# Patient Record
Sex: FEMALE | URBAN - METROPOLITAN AREA
[De-identification: names, ages, dates, MRNs, and addresses within clinical notes are randomized per-mention and may not be internally consistent; named-entity substitution may affect disease eponyms.]

---

## 2017-04-21 RX ORDER — CITALOPRAM 10 MG/1
0 TABLET, FILM COATED ORAL
Qty: 30 | Refills: 0 | COMMUNITY
Start: 2017-04-21

## 2017-04-27 RX ORDER — RANITIDINE HYDROCHLORIDE 150 MG/1
0 TABLET, FILM COATED ORAL
Qty: 60 | Refills: 0 | COMMUNITY
Start: 2017-04-27

## 2017-04-27 RX ORDER — MONTELUKAST 4 MG/1
0 TABLET, CHEWABLE ORAL
Qty: 30 | Refills: 0 | COMMUNITY
Start: 2017-04-27

## 2017-04-27 RX ORDER — QUETIAPINE FUMARATE 200 MG/1
0 TABLET, FILM COATED ORAL
Qty: 30 | Refills: 0 | COMMUNITY
Start: 2017-04-27

## 2017-05-13 RX ORDER — MEMANTINE HYDROCHLORIDE 10 MG/1
0 TABLET ORAL
Qty: 180 | Refills: 0 | COMMUNITY
Start: 2017-05-13

## 2017-05-16 ENCOUNTER — INPATIENT (INPATIENT)
Facility: HOSPITAL | Age: 82
LOS: 2 days | Discharge: NURSING, REHAB OR LONG-TERM CARE | DRG: 592 | End: 2017-05-19
Attending: HOSPITALIST | Admitting: HOSPITALIST
Payer: COMMERCIAL

## 2017-05-16 VITALS
WEIGHT: 100.09 LBS | RESPIRATION RATE: 20 BRPM | SYSTOLIC BLOOD PRESSURE: 96 MMHG | HEART RATE: 90 BPM | HEIGHT: 55 IN | DIASTOLIC BLOOD PRESSURE: 55 MMHG | OXYGEN SATURATION: 98 % | TEMPERATURE: 98 F

## 2017-05-16 LAB
ALBUMIN SERPL ELPH-MCNC: 3.2 G/DL — LOW (ref 3.5–5)
ALP SERPL-CCNC: 61 U/L — SIGNIFICANT CHANGE UP (ref 40–120)
ALT FLD-CCNC: 16 U/L DA — SIGNIFICANT CHANGE UP (ref 10–60)
ANION GAP SERPL CALC-SCNC: 6 MMOL/L — SIGNIFICANT CHANGE UP (ref 5–17)
APPEARANCE UR: ABNORMAL
APTT BLD: 40 SEC — HIGH (ref 27.5–37.4)
AST SERPL-CCNC: 15 U/L — SIGNIFICANT CHANGE UP (ref 10–40)
BACTERIA # UR AUTO: ABNORMAL /HPF
BASOPHILS # BLD AUTO: 0.1 K/UL — SIGNIFICANT CHANGE UP (ref 0–0.2)
BASOPHILS NFR BLD AUTO: 1.3 % — SIGNIFICANT CHANGE UP (ref 0–2)
BILIRUB SERPL-MCNC: 0.4 MG/DL — SIGNIFICANT CHANGE UP (ref 0.2–1.2)
BILIRUB UR-MCNC: ABNORMAL
BUN SERPL-MCNC: 24 MG/DL — HIGH (ref 7–18)
CALCIUM SERPL-MCNC: 9.5 MG/DL — SIGNIFICANT CHANGE UP (ref 8.4–10.5)
CHLORIDE SERPL-SCNC: 111 MMOL/L — HIGH (ref 96–108)
CO2 SERPL-SCNC: 26 MMOL/L — SIGNIFICANT CHANGE UP (ref 22–31)
COLOR SPEC: YELLOW — SIGNIFICANT CHANGE UP
CREAT SERPL-MCNC: 1.87 MG/DL — HIGH (ref 0.5–1.3)
DIFF PNL FLD: ABNORMAL
EOSINOPHIL # BLD AUTO: 0.1 K/UL — SIGNIFICANT CHANGE UP (ref 0–0.5)
EOSINOPHIL NFR BLD AUTO: 0.6 % — SIGNIFICANT CHANGE UP (ref 0–6)
EPI CELLS # UR: ABNORMAL (ref 0–10)
GLUCOSE SERPL-MCNC: 142 MG/DL — HIGH (ref 70–99)
GLUCOSE UR QL: NEGATIVE — SIGNIFICANT CHANGE UP
HCT VFR BLD CALC: 35.2 % — SIGNIFICANT CHANGE UP (ref 34.5–45)
HGB BLD-MCNC: 11.2 G/DL — LOW (ref 11.5–15.5)
INR BLD: 1.17 RATIO — HIGH (ref 0.88–1.16)
KETONES UR-MCNC: NEGATIVE — SIGNIFICANT CHANGE UP
LEUKOCYTE ESTERASE UR-ACNC: ABNORMAL
LIDOCAIN IGE QN: 62 U/L — LOW (ref 73–393)
LYMPHOCYTES # BLD AUTO: 1.8 K/UL — SIGNIFICANT CHANGE UP (ref 1–3.3)
LYMPHOCYTES # BLD AUTO: 18.5 % — SIGNIFICANT CHANGE UP (ref 13–44)
MCHC RBC-ENTMCNC: 26.3 PG — LOW (ref 27–34)
MCHC RBC-ENTMCNC: 31.7 GM/DL — LOW (ref 32–36)
MCV RBC AUTO: 82.9 FL — SIGNIFICANT CHANGE UP (ref 80–100)
MONOCYTES # BLD AUTO: 0.5 K/UL — SIGNIFICANT CHANGE UP (ref 0–0.9)
MONOCYTES NFR BLD AUTO: 5.6 % — SIGNIFICANT CHANGE UP (ref 2–14)
NEUTROPHILS # BLD AUTO: 7.3 K/UL — SIGNIFICANT CHANGE UP (ref 1.8–7.4)
NEUTROPHILS NFR BLD AUTO: 74.1 % — SIGNIFICANT CHANGE UP (ref 43–77)
NITRITE UR-MCNC: NEGATIVE — SIGNIFICANT CHANGE UP
PH UR: 5 — SIGNIFICANT CHANGE UP (ref 5–8)
PLATELET # BLD AUTO: 264 K/UL — SIGNIFICANT CHANGE UP (ref 150–400)
POTASSIUM SERPL-MCNC: 4.4 MMOL/L — SIGNIFICANT CHANGE UP (ref 3.5–5.3)
POTASSIUM SERPL-SCNC: 4.4 MMOL/L — SIGNIFICANT CHANGE UP (ref 3.5–5.3)
PROT SERPL-MCNC: 7.8 G/DL — SIGNIFICANT CHANGE UP (ref 6–8.3)
PROT UR-MCNC: 30 MG/DL
PROTHROM AB SERPL-ACNC: 12.8 SEC — HIGH (ref 9.8–12.7)
RBC # BLD: 4.25 M/UL — SIGNIFICANT CHANGE UP (ref 3.8–5.2)
RBC # FLD: 13.1 % — SIGNIFICANT CHANGE UP (ref 10.3–14.5)
RBC CASTS # UR COMP ASSIST: ABNORMAL /HPF (ref 0–2)
SODIUM SERPL-SCNC: 143 MMOL/L — SIGNIFICANT CHANGE UP (ref 135–145)
SP GR SPEC: 1.03 — HIGH (ref 1.01–1.02)
UROBILINOGEN FLD QL: 1
WBC # BLD: 9.8 K/UL — SIGNIFICANT CHANGE UP (ref 3.8–10.5)
WBC # FLD AUTO: 9.8 K/UL — SIGNIFICANT CHANGE UP (ref 3.8–10.5)
WBC UR QL: ABNORMAL /HPF (ref 0–5)

## 2017-05-16 PROCEDURE — 71010: CPT | Mod: 26

## 2017-05-16 RX ORDER — SODIUM CHLORIDE 9 MG/ML
3 INJECTION INTRAMUSCULAR; INTRAVENOUS; SUBCUTANEOUS ONCE
Qty: 0 | Refills: 0 | Status: COMPLETED | OUTPATIENT
Start: 2017-05-16 | End: 2017-05-16

## 2017-05-16 RX ADMIN — SODIUM CHLORIDE 3 MILLILITER(S): 9 INJECTION INTRAMUSCULAR; INTRAVENOUS; SUBCUTANEOUS at 21:52

## 2017-05-16 NOTE — ED PROVIDER NOTE - PSH
History of knee replacement, total, bilateral    History of tonsillectomy    Lung nodule  Right Lung Surgery  - 1/2014  PAD (peripheral artery disease)  s/p left lower extremity stent

## 2017-05-16 NOTE — ED PROVIDER NOTE - PMH
Dementia    Diabetes mellitus, type 2    Dyslipidemia    GERD (gastroesophageal reflux disease)    HTN (hypertension)    Overactive bladder    PAD (peripheral artery disease)    Pancreatitis

## 2017-05-16 NOTE — ED PROVIDER NOTE - CARE PLAN
Principal Discharge DX:	Sacral decubitus ulcer, stage IV  Secondary Diagnosis:	Sacral decubitus ulcer

## 2017-05-16 NOTE — ED ADULT NURSE NOTE - NS ED NURSE LEVEL OF CONSCIOUSNESS MENTAL STATUS
Denies known Latex allergy or symptoms of Latex sensitivity.  Medications verified, no changes.    Patient c/o right hip pain for more than one month.     Health Maintenance Summary     Topic Due On Due Status Completed On Postpone Until Reason    MAMMOGRAM - BREAST CANCER SCREENING Jan 1, 2013 Postponed Jan 1, 2011 Jun 13, 2017 Test ordered & Appt scheduled to perform    Pap Smear - Cervical Cancer Screening  Dec 8, 2020 Not Due Dec 8, 2015      Immunization - TDAP Pregnancy  Hidden       IMMUNIZATION - DTaP/Tdap/Td Sep 29, 1991 Overdue       Immunization-Influenza  Completed Dec 13, 2016            Patient is up to date, no discussion needed .       Awake

## 2017-05-16 NOTE — ED PROVIDER NOTE - OBJECTIVE STATEMENT
83 y/o F w/ PMHx of HTN & HLD presents to the ED c/o worsening sacral decubiti along w/ daughter stating she is having hard time taking care of pt. Pt complaining of worsening pain to sacrum area. Pt's daughter states she has made multiple pleas w/ PMD to get more help to point where daughter cannot physically be able to care of mother & needs assistance. Pt's daughter notes pt's sacral decubitus is getting worse because pt cannot be rolled as many times. Pt denies fever, chills, or any other complaints. Pt is allergic to Penicillin & Sulfa drugs.

## 2017-05-16 NOTE — ED PROVIDER NOTE - MEDICAL DECISION MAKING DETAILS
pt is here due to worsening scaral decub along with daughter with the inability to take care of pt.  Will nelson admit for for  and possible placement and evaluation of sacral decub.

## 2017-05-17 DIAGNOSIS — N17.9 ACUTE KIDNEY FAILURE, UNSPECIFIED: ICD-10-CM

## 2017-05-17 DIAGNOSIS — L89.152 PRESSURE ULCER OF SACRAL REGION, STAGE 2: ICD-10-CM

## 2017-05-17 DIAGNOSIS — L89.154 PRESSURE ULCER OF SACRAL REGION, STAGE 4: ICD-10-CM

## 2017-05-17 DIAGNOSIS — F03.90 UNSPECIFIED DEMENTIA, UNSPECIFIED SEVERITY, WITHOUT BEHAVIORAL DISTURBANCE, PSYCHOTIC DISTURBANCE, MOOD DISTURBANCE, AND ANXIETY: ICD-10-CM

## 2017-05-17 DIAGNOSIS — L89.159 PRESSURE ULCER OF SACRAL REGION, UNSPECIFIED STAGE: ICD-10-CM

## 2017-05-17 DIAGNOSIS — Z29.9 ENCOUNTER FOR PROPHYLACTIC MEASURES, UNSPECIFIED: ICD-10-CM

## 2017-05-17 DIAGNOSIS — N39.0 URINARY TRACT INFECTION, SITE NOT SPECIFIED: ICD-10-CM

## 2017-05-17 DIAGNOSIS — I10 ESSENTIAL (PRIMARY) HYPERTENSION: ICD-10-CM

## 2017-05-17 DIAGNOSIS — E11.9 TYPE 2 DIABETES MELLITUS WITHOUT COMPLICATIONS: ICD-10-CM

## 2017-05-17 LAB
CRP SERPL-MCNC: 2.7 MG/DL — HIGH (ref 0–0.4)
ERYTHROCYTE [SEDIMENTATION RATE] IN BLOOD: 59 MM/HR — HIGH (ref 0–20)

## 2017-05-17 PROCEDURE — 99223 1ST HOSP IP/OBS HIGH 75: CPT | Mod: GC

## 2017-05-17 PROCEDURE — 99285 EMERGENCY DEPT VISIT HI MDM: CPT

## 2017-05-17 RX ORDER — MEMANTINE HYDROCHLORIDE 10 MG/1
10 TABLET ORAL EVERY 12 HOURS
Qty: 0 | Refills: 0 | Status: DISCONTINUED | OUTPATIENT
Start: 2017-05-17 | End: 2017-05-19

## 2017-05-17 RX ORDER — VANCOMYCIN HCL 1 G
1000 VIAL (EA) INTRAVENOUS ONCE
Qty: 0 | Refills: 0 | Status: COMPLETED | OUTPATIENT
Start: 2017-05-17 | End: 2017-05-17

## 2017-05-17 RX ORDER — INSULIN LISPRO 100/ML
VIAL (ML) SUBCUTANEOUS
Qty: 0 | Refills: 0 | Status: DISCONTINUED | OUTPATIENT
Start: 2017-05-17 | End: 2017-05-19

## 2017-05-17 RX ORDER — DONEPEZIL HYDROCHLORIDE 10 MG/1
5 TABLET, FILM COATED ORAL AT BEDTIME
Qty: 0 | Refills: 0 | Status: DISCONTINUED | OUTPATIENT
Start: 2017-05-17 | End: 2017-05-19

## 2017-05-17 RX ORDER — QUETIAPINE FUMARATE 200 MG/1
25 TABLET, FILM COATED ORAL AT BEDTIME
Qty: 0 | Refills: 0 | Status: DISCONTINUED | OUTPATIENT
Start: 2017-05-17 | End: 2017-05-19

## 2017-05-17 RX ORDER — ACETAMINOPHEN 500 MG
650 TABLET ORAL EVERY 6 HOURS
Qty: 0 | Refills: 0 | Status: DISCONTINUED | OUTPATIENT
Start: 2017-05-17 | End: 2017-05-19

## 2017-05-17 RX ORDER — MORPHINE SULFATE 50 MG/1
1 CAPSULE, EXTENDED RELEASE ORAL EVERY 4 HOURS
Qty: 0 | Refills: 0 | Status: DISCONTINUED | OUTPATIENT
Start: 2017-05-17 | End: 2017-05-19

## 2017-05-17 RX ORDER — ATORVASTATIN CALCIUM 80 MG/1
10 TABLET, FILM COATED ORAL AT BEDTIME
Qty: 0 | Refills: 0 | Status: DISCONTINUED | OUTPATIENT
Start: 2017-05-17 | End: 2017-05-19

## 2017-05-17 RX ORDER — VALSARTAN 80 MG/1
160 TABLET ORAL DAILY
Qty: 0 | Refills: 0 | Status: DISCONTINUED | OUTPATIENT
Start: 2017-05-17 | End: 2017-05-17

## 2017-05-17 RX ORDER — MEMANTINE HYDROCHLORIDE 10 MG/1
10 TABLET ORAL DAILY
Qty: 0 | Refills: 0 | Status: DISCONTINUED | OUTPATIENT
Start: 2017-05-17 | End: 2017-05-17

## 2017-05-17 RX ORDER — CEFTRIAXONE 500 MG/1
1 INJECTION, POWDER, FOR SOLUTION INTRAMUSCULAR; INTRAVENOUS EVERY 24 HOURS
Qty: 0 | Refills: 0 | Status: DISCONTINUED | OUTPATIENT
Start: 2017-05-17 | End: 2017-05-19

## 2017-05-17 RX ORDER — PIPERACILLIN AND TAZOBACTAM 4; .5 G/20ML; G/20ML
3.38 INJECTION, POWDER, LYOPHILIZED, FOR SOLUTION INTRAVENOUS ONCE
Qty: 0 | Refills: 0 | Status: DISCONTINUED | OUTPATIENT
Start: 2017-05-17 | End: 2017-05-17

## 2017-05-17 RX ORDER — SODIUM CHLORIDE 9 MG/ML
1000 INJECTION INTRAMUSCULAR; INTRAVENOUS; SUBCUTANEOUS
Qty: 0 | Refills: 0 | Status: DISCONTINUED | OUTPATIENT
Start: 2017-05-17 | End: 2017-05-18

## 2017-05-17 RX ORDER — MORPHINE SULFATE 50 MG/1
1 CAPSULE, EXTENDED RELEASE ORAL ONCE
Qty: 0 | Refills: 0 | Status: DISCONTINUED | OUTPATIENT
Start: 2017-05-17 | End: 2017-05-17

## 2017-05-17 RX ORDER — HEPARIN SODIUM 5000 [USP'U]/ML
5000 INJECTION INTRAVENOUS; SUBCUTANEOUS EVERY 12 HOURS
Qty: 0 | Refills: 0 | Status: DISCONTINUED | OUTPATIENT
Start: 2017-05-17 | End: 2017-05-19

## 2017-05-17 RX ORDER — FAMOTIDINE 10 MG/ML
20 INJECTION INTRAVENOUS DAILY
Qty: 0 | Refills: 0 | Status: DISCONTINUED | OUTPATIENT
Start: 2017-05-17 | End: 2017-05-19

## 2017-05-17 RX ORDER — MONTELUKAST 4 MG/1
10 TABLET, CHEWABLE ORAL AT BEDTIME
Qty: 0 | Refills: 0 | Status: DISCONTINUED | OUTPATIENT
Start: 2017-05-17 | End: 2017-05-19

## 2017-05-17 RX ORDER — CITALOPRAM 10 MG/1
40 TABLET, FILM COATED ORAL DAILY
Qty: 0 | Refills: 0 | Status: DISCONTINUED | OUTPATIENT
Start: 2017-05-17 | End: 2017-05-19

## 2017-05-17 RX ADMIN — HEPARIN SODIUM 5000 UNIT(S): 5000 INJECTION INTRAVENOUS; SUBCUTANEOUS at 17:12

## 2017-05-17 RX ADMIN — CITALOPRAM 40 MILLIGRAM(S): 10 TABLET, FILM COATED ORAL at 11:32

## 2017-05-17 RX ADMIN — QUETIAPINE FUMARATE 25 MILLIGRAM(S): 200 TABLET, FILM COATED ORAL at 21:52

## 2017-05-17 RX ADMIN — MORPHINE SULFATE 1 MILLIGRAM(S): 50 CAPSULE, EXTENDED RELEASE ORAL at 11:30

## 2017-05-17 RX ADMIN — SODIUM CHLORIDE 65 MILLILITER(S): 9 INJECTION INTRAMUSCULAR; INTRAVENOUS; SUBCUTANEOUS at 06:07

## 2017-05-17 RX ADMIN — SODIUM CHLORIDE 65 MILLILITER(S): 9 INJECTION INTRAMUSCULAR; INTRAVENOUS; SUBCUTANEOUS at 17:12

## 2017-05-17 RX ADMIN — ATORVASTATIN CALCIUM 10 MILLIGRAM(S): 80 TABLET, FILM COATED ORAL at 21:52

## 2017-05-17 RX ADMIN — MORPHINE SULFATE 1 MILLIGRAM(S): 50 CAPSULE, EXTENDED RELEASE ORAL at 12:30

## 2017-05-17 RX ADMIN — MEMANTINE HYDROCHLORIDE 10 MILLIGRAM(S): 10 TABLET ORAL at 17:12

## 2017-05-17 RX ADMIN — FAMOTIDINE 20 MILLIGRAM(S): 10 INJECTION INTRAVENOUS at 11:32

## 2017-05-17 RX ADMIN — HEPARIN SODIUM 5000 UNIT(S): 5000 INJECTION INTRAVENOUS; SUBCUTANEOUS at 05:49

## 2017-05-17 RX ADMIN — CEFTRIAXONE 100 GRAM(S): 500 INJECTION, POWDER, FOR SOLUTION INTRAMUSCULAR; INTRAVENOUS at 11:32

## 2017-05-17 RX ADMIN — DONEPEZIL HYDROCHLORIDE 5 MILLIGRAM(S): 10 TABLET, FILM COATED ORAL at 21:52

## 2017-05-17 RX ADMIN — Medication 250 MILLIGRAM(S): at 03:19

## 2017-05-17 RX ADMIN — MONTELUKAST 10 MILLIGRAM(S): 4 TABLET, CHEWABLE ORAL at 21:52

## 2017-05-17 NOTE — H&P ADULT - PROBLEM SELECTOR PLAN 4
Will hold home losartan due to renal function   F/up with PMD for baseline renal function   Systolic Blood pressure  <120

## 2017-05-17 NOTE — PHYSICAL THERAPY INITIAL EVALUATION ADULT - PLANNED THERAPY INTERVENTIONS, PT EVAL
strengthening/bed mobility training/neuromuscular re-education/gait training/transfer training/balance training/ROM/postural re-education

## 2017-05-17 NOTE — PHYSICAL THERAPY INITIAL EVALUATION ADULT - GENERAL OBSERVATIONS, REHAB EVAL
Consult received, chart reviewed. Patient received supine in bed, NAD, + L IV. Spouse and daughter present; RN present changing sacral dressings. Patient agreed to EVALUATION from Physical Therapist.

## 2017-05-17 NOTE — CONSULT NOTE ADULT - SUBJECTIVE AND OBJECTIVE BOX
83 yo F PMH dementia admitted for decubitus ulcer. Surgery called for evaluation. History obtained from chart. Pt is bedbound w/ long standing h/o ulcer and daughter unable to take care of her.    PAST MEDICAL & SURGICAL HISTORY:  PAD (peripheral artery disease)  Overactive bladder  GERD (gastroesophageal reflux disease)  Dyslipidemia  HTN (hypertension)  Pancreatitis  Dementia  Diabetes mellitus, type 2  Lung nodule: Right Lung Surgery  - 1/2014  History of tonsillectomy  PAD (peripheral artery disease): s/p left lower extremity stent  History of knee replacement, total, bilateral    MEDICATIONS  (STANDING):  citalopram 40milliGRAM(s) Oral daily  atorvastatin 10milliGRAM(s) Oral at bedtime  QUEtiapine 25milliGRAM(s) Oral at bedtime  donepezil 5milliGRAM(s) Oral at bedtime  famotidine    Tablet 20milliGRAM(s) Oral daily  montelukast 10milliGRAM(s) Oral at bedtime  heparin  Injectable 5000Unit(s) SubCutaneous every 12 hours  memantine 10milliGRAM(s) Oral every 12 hours  insulin lispro (HumaLOG) corrective regimen sliding scale  SubCutaneous three times a day before meals  cefTRIAXone   IVPB 1Gram(s) IV Intermittent every 24 hours  sodium chloride 0.9%. 1000milliLiter(s) IV Continuous <Continuous>    MEDICATIONS  (PRN):  acetaminophen    Suspension. 650milliGRAM(s) Oral every 6 hours PRN Moderate Pain (4 - 6)  morphine  - Injectable 1milliGRAM(s) IV Push every 4 hours PRN Severe Pain (7 - 10)    Vital Signs Last 24 Hrs  T(C): 37.3, Max: 37.3 (05-17 @ 02:10)  T(F): 99.2, Max: 99.2 (05-17 @ 15:40)  HR: 81 (76 - 90)  BP: 139/64 (96/55 - 139/64)  BP(mean): --  RR: 16 (16 - 20)  SpO2: 100% (98% - 100%)    PE:   Gen: awake, not alert or oriented, NAD  Abd: soft NT ND  Back: stage 2 decubitus ulcers on b/l buttock. R buttock with 1x1.5cm of necrotic tissue.  L buttock with stage 2 ulcer. no purulence or foul smell noted.                           11.2   9.8   )-----------( 264      ( 16 May 2017 21:53 )             35.2     05-16    143  |  111<H>  |  24<H>  ----------------------------<  142<H>  4.4   |  26  |  1.87<H>    Ca    9.5      16 May 2017 21:53    TPro  7.8  /  Alb  3.2<L>  /  TBili  0.4  /  DBili  x   /  AST  15  /  ALT  16  /  AlkPhos  61  05-16

## 2017-05-17 NOTE — H&P ADULT - PROBLEM SELECTOR PLAN 5
will continue with memantine will continue with memantine; advanced dementia and unable to complete ADLs

## 2017-05-17 NOTE — CHART NOTE - NSCHARTNOTEFT_GEN_A_CORE
Patient was seen and examined at bedside.     Sacral wound: largest Patient was seen and examined at bedside.     # Sacral wound: various stage, some portion of the wound is covered with eschar, but most of the lesions are stage 2. Largest on right upper buttock 5cm x 5.2cm with no active discharge or bleeding, unstageable(escharous postion noted). Left buttock 2cm x 1.7cm stage 3, right lower buttock  stage 2, 4cm x 1cm. Coccyx stage 2 2.5cm x 4cm. Bilateral heels stage 1.    Wound care nurse and Dr. Nails was consulted for wound care.    # UTI: Continue ceftriaxone 1g IV daily, Day 1. F/U urine culture.    # Social issue: Social work follow up for long term placement. Out of bed to chair, PT consultation. Patient was seen and examined at bedside.     # Sacral wound: various stage, some portion of the wound is covered with eschar, but most of the lesions are stage 2. Largest on right upper buttock 5cm x 5.2cm with no active discharge or bleeding, unstageable(escharous postion noted). Left buttock 2cm x 1.7cm stage 3, right lower buttock  stage 2, 4cm x 1cm. Coccyx stage 2 2.5cm x 4cm. Bilateral heels stage 1.    ESR 57, CRP 2.7, no fever or leukocytosis.    Wound care nurse and Dr. Nails was consulted for wound care. Surgery was consulted and evaluated patient at the bedside, no debridement or further intervention needed.    No need for MRI of the sacrum as per attending Dr. Menendez, as wound does not look actively infected.    # UTI: Continue ceftriaxone 1g IV daily, Day 1. F/U urine culture.    # Social issue: Social work follow up for long term placement. Out of bed to chair, PT consultation. Patient was seen and examined at bedside.     # Sacral wound: various stage, some portion of the wound is covered with eschar, but most of the lesions are stage 2. Largest on right upper buttock 5cm x 5.2cm with no active discharge or bleeding, unstageable(escharous postion noted). Left buttock 2cm x 1.7cm stage 3, right lower buttock  stage 2, 4cm x 1cm. Coccyx stage 2 2.5cm x 4cm. Bilateral heels stage 1.    ESR 57, CRP 2.7, no fever or leukocytosis.    Wound care nurse and Dr. Nails was consulted for wound care. Surgery was consulted and evaluated patient at the bedside, no debridement or further intervention needed.    No need for MRI of the sacrum as per attending Dr. Menendez, as wound does not look actively infected.    # UTI: Continue ceftriaxone 1g IV daily, Day 1. F/U urine culture.    # Social issue: Social work follow up for long term placement. Out of bed to chair, PT consultation was done: Sub- Acute Rehab. Patient was seen and examined at bedside.     # Sacral wound: various stage, some portion of the wound is covered with eschar, but most of the lesions are stage 2. Largest on right upper buttock 5cm x 5.2cm with no active discharge or bleeding, unstageable(escharous postion noted). Left buttock 2cm x 1.7cm stage 3, right lower buttock  stage 2, 4cm x 1cm. Coccyx stage 2 2.5cm x 4cm. Bilateral heels stage 1.    ESR 59, CRP 2.7, no fever or leukocytosis.    Wound care nurse and Dr. Nails was consulted for wound care. Surgery was consulted and evaluated patient at the bedside, no debridement or further intervention needed.    No need for MRI of the sacrum as per attending Dr. Menendez, as wound does not look actively infected.    # UTI: Continue ceftriaxone 1g IV daily, Day 1. F/U urine culture.    # Social issue: Social work follow up for long term placement. Out of bed to chair, PT consultation was done: Sub- Acute Rehab.

## 2017-05-17 NOTE — PHYSICAL THERAPY INITIAL EVALUATION ADULT - LEVEL OF INDEPENDENCE: STAIR NEGOTIATION, REHAB EVAL
Unable to assess at this time secondary to pt impaired strength and does not negotiate stairs at baseline.

## 2017-05-17 NOTE — PHYSICAL THERAPY INITIAL EVALUATION ADULT - ADDITIONAL COMMENTS
Pt daughter reports pt ambulated short household distances with assist and rolling walker. Daughter reports pt has been requiring additional assist recently. Pt does not own any additional equipment besides rolling walker.

## 2017-05-17 NOTE — PHYSICAL THERAPY INITIAL EVALUATION ADULT - PHYSICAL ASSIST/NONPHYSICAL ASSIST: STAND/SIT, REHAB EVAL
nonverbal cues (demo/gestures)/verbal cues/2 person assist verbal cues/1 person assist/nonverbal cues (demo/gestures)

## 2017-05-17 NOTE — CONSULT NOTE ADULT - ASSESSMENT
Bilateral heel decubitus DTI  -Protective dressing to bilateral heel. Please provide and apply Z-flex or multipodus singer to both heels  -CHANDLER/PVR  -Elevate bilateral LE and off load bilateral heel  -Will continue to monitor

## 2017-05-17 NOTE — PHYSICAL THERAPY INITIAL EVALUATION ADULT - TRANSFER SAFETY CONCERNS NOTED: BED/CHAIR, REHAB EVAL
decreased balance during turns/decreased weight-shifting ability/stand pivot/decreased safety awareness/decreased sequencing ability

## 2017-05-17 NOTE — PHYSICAL THERAPY INITIAL EVALUATION ADULT - CRITERIA FOR SKILLED THERAPEUTIC INTERVENTIONS
therapy frequency/anticipated discharge recommendation/rehab potential/impairments found/functional limitations in following categories/risk reduction/prevention/predicted duration of therapy intervention

## 2017-05-17 NOTE — H&P ADULT - ASSESSMENT
82 F from home ,lives with daughter , with PMH of dementia AOx0-1 , HTN, bedbound was brought to ER by daughter as she is unable to take care of her and worsening decubitus ulcer.   Stage II decubitus ulcer on buttock, no signs of infection , clean dry wound. ESR elevated but UA mildly positive .

## 2017-05-17 NOTE — H&P ADULT - PROBLEM SELECTOR PLAN 3
Unknown baseline   Pt clinically appears dry   Will start gentle hydration   F/up with PMD for baseline Cr.

## 2017-05-17 NOTE — H&P ADULT - PROBLEM SELECTOR PLAN 2
Stage II decubitus ulcer on buttock, no signs of infection , clean dry wound. ESR elevated but UA mildly positive .  Wound care consult   Received vancomycin and levaquin in ED Stage II decubitus ulcer on buttock, no signs of infection , clean dry wound. ESR elevated but UA mildly positive .  Wound care consult - Dr Schwarz  Received vancomycin and levaquin in ED

## 2017-05-17 NOTE — PHYSICAL THERAPY INITIAL EVALUATION ADULT - FOLLOWS COMMANDS/ANSWERS QUESTIONS, REHAB EVAL
<25% of the time/unable to follow multi-step instructions 25% of the time/able to follow single-step instructions

## 2017-05-17 NOTE — H&P ADULT - ATTENDING COMMENTS
Patient was seen and examined by myself with team at about 11:20 a. Case was discussed with house staff in details.  Elderly female with advanced dementia admitted for progression of dementia with presumed UTI and dehydration  she was noted with sacral decubitous ulcers- continue with local wound care started  she also has renal failure- avoid nephrotoxins.  Other plan as detailed above.

## 2017-05-17 NOTE — PHYSICAL THERAPY INITIAL EVALUATION ADULT - GROSSLY INTACT, SENSORY
Unable to formally assess at this time secondary to impaired cognition, however, pt appeared to react to light touch

## 2017-05-17 NOTE — H&P ADULT - PROBLEM SELECTOR PLAN 6
unclear if patient is on metformin, as per outpt medication , no metformin   Please call pharmacy for confirmation   Will c/w HSS for now   F/up HbA1c

## 2017-05-17 NOTE — PHYSICAL THERAPY INITIAL EVALUATION ADULT - MANUAL MUSCLE TESTING RESULTS, REHAB EVAL
b/l UE shoulder F/ABD 3-/5, elbow F/E 3+/5. Unable to formally assess LE MMT secondary to impaired cognition and inability to follow commands, but, LE at least 2+/5 through ability to perform functional mobility today

## 2017-05-17 NOTE — H&P ADULT - PROBLEM SELECTOR PLAN 1
UA mildly positive   As pt unable to express pain or burning   Will continue with rocephin   F/up urine culture

## 2017-05-17 NOTE — CONSULT NOTE ADULT - PROBLEM SELECTOR RECOMMENDATION 9
1. Recommend Santyl to area of necrosis on R buttock  2. medihoney to remaining areas of ulcer  3. frequent turning  4. no surgical intervention necessary.

## 2017-05-17 NOTE — PHYSICAL THERAPY INITIAL EVALUATION ADULT - TRANSFER SAFETY CONCERNS NOTED: SIT/STAND, REHAB EVAL
decreased weight-shifting ability/losing balance/decreased step length/decreased safety awareness/decreased balance during turns

## 2017-05-17 NOTE — H&P ADULT - HISTORY OF PRESENT ILLNESS
82 F from home ,lives with daughter , with PMH of dementia AOx0-1 , HTN, bedbound was brought to ER by daughter as she is unable to take care of her. Patient unable to provide any history, AOX0, denied any pain anywhere in her body.   As per ED note:  81 y/o F w/ PMHx of HTN & HLD presents to the ED c/o worsening sacral decubiti along w/ daughter stating she is having hard time taking care of pt. Pt complaining of worsening pain to sacrum area. Pt's daughter states she has made multiple pleas w/ PMD to get more help to point where daughter cannot physically be able to care of mother & needs assistance. Pt's daughter notes pt's sacral decubitus is getting worse because pt cannot be rolled as many times.

## 2017-05-17 NOTE — CONSULT NOTE ADULT - SUBJECTIVE AND OBJECTIVE BOX
82 F from home with PMH of dementia, HTN, bedbound was brought to ER by daughter as she is unable to take care of her. Patient unable to provide any history, AOX0, Patient's son was at bedside. Patient was admitted for UTI and sacral decubitus ulcers. Consulted for bilateral heel pressure ulcers.      Past Medical History:  Dementia    Diabetes mellitus, type 2    Dyslipidemia    GERD (gastroesophageal reflux disease)    HTN (hypertension)    Overactive bladder    PAD (peripheral artery disease)    Pancreatitis.    Past Surgical History:  History of knee replacement, total, bilateral    History of tonsillectomy    Lung nodule  Right Lung Surgery  - 1/2014  PAD (peripheral artery disease)  s/p left lower extremity stent  Medications at home:   MEMANTINE HCL 10 MG TABLET:    ·PRAVASTATIN SODIUM 40 MG TAB:   · MONTELUKAST SOD 10 MG TABLET:   · QUETIAPINE FUMARATE 25 MG TAB:   · CITALOPRAM HBR 40 MG TABLET:   · docusate sodium 100 mg oral capsule: 1 cap(s) orally 3 times a day  · senna oral tablet: 2 tab(s) orally once a day (at bedtime)  · VESIcare 5 mg oral tablet: 1 tab(s) orally once a day  4 pm  · metformin 1000 mg oral tablet: 1 tab(s) orally 2 times a day  Breakfast and Bedtime  · Diovan 160 mg oral tablet: 1 tab(s) orally once a day  AM  · donepezil 5 mg oral tablet: 1 tab(s) orally once a day (at bedtime)  · ranitidine 150 mg oral tablet: 1 tab(s) orally once a day  · acetaminophen 325 mg oral tablet: 1 tab(s) orally once, As Needed  Pain  Allergy: PCN, Sulfa drugs  SOCIAL HX: unknown if ever smoked cig.  LOWER EXTREMITY PHYSICAL EXAM:  81 y/o female awake and alert but not oriented in NAD  Vitals: T-99.2, BP-139/64, P-81, R-16, %  DERM: Skin is cool and dry. Deep tissue injuries to bilateral heel, right measuring 5.1 cm x 4.9 cm with dry hard necrotic tissue/eschar covering with dark, hyperpigmentation. No drainage, no purulent, no malodor, no erythema. Left heel decubitus ulcer DTI, measuring 5.3cm x 5.0 cm with fluctuant eschar covering with dark, hyperpigmentation. No drainage, no purulent, no malodor, no erythema. Nails are elongated, thickened and dystrophic x 10.   VASC: Non-palpable pedal pulses bilateral, no edema  NEURO: unable to perform, pt on co-operating  M/S: Dorsally contracted digits 2-5 bilateral, Lateral deviated hallux bilateral

## 2017-05-17 NOTE — PHYSICAL THERAPY INITIAL EVALUATION ADULT - SKIN COLOR/CHARACTERISTICS
at exposed areas except multiple sacral ulcers; dressings c/d/i multiple sacral ulcers; dressings c/d/i, b/l posterior heel DTIs

## 2017-05-18 DIAGNOSIS — T14.8 OTHER INJURY OF UNSPECIFIED BODY REGION: ICD-10-CM

## 2017-05-18 LAB
ANION GAP SERPL CALC-SCNC: 10 MMOL/L — SIGNIFICANT CHANGE UP (ref 5–17)
BUN SERPL-MCNC: 16 MG/DL — SIGNIFICANT CHANGE UP (ref 7–18)
CALCIUM SERPL-MCNC: 9.8 MG/DL — SIGNIFICANT CHANGE UP (ref 8.4–10.5)
CHLORIDE SERPL-SCNC: 115 MMOL/L — HIGH (ref 96–108)
CHOLEST SERPL-MCNC: 149 MG/DL — SIGNIFICANT CHANGE UP (ref 10–199)
CO2 SERPL-SCNC: 22 MMOL/L — SIGNIFICANT CHANGE UP (ref 22–31)
CREAT SERPL-MCNC: 1.24 MG/DL — SIGNIFICANT CHANGE UP (ref 0.5–1.3)
CULTURE RESULTS: NO GROWTH — SIGNIFICANT CHANGE UP
GLUCOSE SERPL-MCNC: 108 MG/DL — HIGH (ref 70–99)
HBA1C BLD-MCNC: 5.8 % — HIGH (ref 4–5.6)
HCT VFR BLD CALC: 33 % — LOW (ref 34.5–45)
HDLC SERPL-MCNC: 54 MG/DL — SIGNIFICANT CHANGE UP (ref 40–125)
HGB BLD-MCNC: 11.2 G/DL — LOW (ref 11.5–15.5)
LIPID PNL WITH DIRECT LDL SERPL: 79 MG/DL — SIGNIFICANT CHANGE UP
MCHC RBC-ENTMCNC: 28.3 PG — SIGNIFICANT CHANGE UP (ref 27–34)
MCHC RBC-ENTMCNC: 34 GM/DL — SIGNIFICANT CHANGE UP (ref 32–36)
MCV RBC AUTO: 83.3 FL — SIGNIFICANT CHANGE UP (ref 80–100)
PLATELET # BLD AUTO: 222 K/UL — SIGNIFICANT CHANGE UP (ref 150–400)
POTASSIUM SERPL-MCNC: 4.7 MMOL/L — SIGNIFICANT CHANGE UP (ref 3.5–5.3)
POTASSIUM SERPL-SCNC: 4.7 MMOL/L — SIGNIFICANT CHANGE UP (ref 3.5–5.3)
RBC # BLD: 3.97 M/UL — SIGNIFICANT CHANGE UP (ref 3.8–5.2)
RBC # FLD: 14.5 % — SIGNIFICANT CHANGE UP (ref 10.3–14.5)
SODIUM SERPL-SCNC: 147 MMOL/L — HIGH (ref 135–145)
SPECIMEN SOURCE: SIGNIFICANT CHANGE UP
TOTAL CHOLESTEROL/HDL RATIO MEASUREMENT: 2.8 RATIO — LOW (ref 3.3–7.1)
TRIGL SERPL-MCNC: 82 MG/DL — SIGNIFICANT CHANGE UP (ref 10–149)
TSH SERPL-MCNC: 2.02 UU/ML — SIGNIFICANT CHANGE UP (ref 0.34–4.82)
VIT B12 SERPL-MCNC: 408 PG/ML — SIGNIFICANT CHANGE UP (ref 243–894)
WBC # BLD: 6.2 K/UL — SIGNIFICANT CHANGE UP (ref 3.8–10.5)
WBC # FLD AUTO: 6.2 K/UL — SIGNIFICANT CHANGE UP (ref 3.8–10.5)

## 2017-05-18 PROCEDURE — 93925 LOWER EXTREMITY STUDY: CPT | Mod: 26

## 2017-05-18 PROCEDURE — 99232 SBSQ HOSP IP/OBS MODERATE 35: CPT | Mod: GC

## 2017-05-18 RX ORDER — COLLAGENASE CLOSTRIDIUM HIST. 250 UNIT/G
1 OINTMENT (GRAM) TOPICAL THREE TIMES A DAY
Qty: 0 | Refills: 0 | Status: DISCONTINUED | OUTPATIENT
Start: 2017-05-18 | End: 2017-05-19

## 2017-05-18 RX ADMIN — CEFTRIAXONE 100 GRAM(S): 500 INJECTION, POWDER, FOR SOLUTION INTRAMUSCULAR; INTRAVENOUS at 12:54

## 2017-05-18 RX ADMIN — ATORVASTATIN CALCIUM 10 MILLIGRAM(S): 80 TABLET, FILM COATED ORAL at 22:05

## 2017-05-18 RX ADMIN — HEPARIN SODIUM 5000 UNIT(S): 5000 INJECTION INTRAVENOUS; SUBCUTANEOUS at 05:12

## 2017-05-18 RX ADMIN — Medication 1 APPLICATION(S): at 12:54

## 2017-05-18 RX ADMIN — HEPARIN SODIUM 5000 UNIT(S): 5000 INJECTION INTRAVENOUS; SUBCUTANEOUS at 18:33

## 2017-05-18 RX ADMIN — DONEPEZIL HYDROCHLORIDE 5 MILLIGRAM(S): 10 TABLET, FILM COATED ORAL at 22:05

## 2017-05-18 RX ADMIN — Medication 1 APPLICATION(S): at 22:06

## 2017-05-18 RX ADMIN — FAMOTIDINE 20 MILLIGRAM(S): 10 INJECTION INTRAVENOUS at 12:54

## 2017-05-18 RX ADMIN — MEMANTINE HYDROCHLORIDE 10 MILLIGRAM(S): 10 TABLET ORAL at 18:34

## 2017-05-18 RX ADMIN — MEMANTINE HYDROCHLORIDE 10 MILLIGRAM(S): 10 TABLET ORAL at 05:12

## 2017-05-18 RX ADMIN — QUETIAPINE FUMARATE 25 MILLIGRAM(S): 200 TABLET, FILM COATED ORAL at 22:05

## 2017-05-18 RX ADMIN — CITALOPRAM 40 MILLIGRAM(S): 10 TABLET, FILM COATED ORAL at 12:54

## 2017-05-18 RX ADMIN — MONTELUKAST 10 MILLIGRAM(S): 4 TABLET, CHEWABLE ORAL at 22:05

## 2017-05-18 NOTE — DISCHARGE NOTE ADULT - MEDICATION SUMMARY - MEDICATIONS TO TAKE
I will START or STAY ON the medications listed below when I get home from the hospital:    acetaminophen 325 mg oral tablet  -- 1 tab(s) by mouth once, As Needed  Pain  -- Indication: For back pain    aspirin 81 mg oral tablet, chewable  -- 1 tab(s) by mouth once a day  -- Indication: For Peripheral arterial disease    CITALOPRAM HBR 40 MG TABLET  -- Indication: For Dementia    metformin 1000 mg oral tablet  -- 1 tab(s) by mouth 2 times a day  Breakfast and Bedtime  -- Indication: For Diabetes    PRAVASTATIN SODIUM 40 MG TAB  -- Indication: For HLD    QUETIAPINE FUMARATE 25 MG TAB  -- Indication: For Dementia    donepezil 5 mg oral tablet  -- 1 tab(s) by mouth once a day (at bedtime)  -- Indication: For Dementia    collagenase 250 units/g topical ointment  -- 1 application on skin 3 times a day  -- Indication: For Decubitus ulcer of sacral region, stage 2    ranitidine 150 mg oral tablet  -- 1 tab(s) by mouth once a day  -- Indication: For need for prophylaxis    docusate sodium 100 mg oral capsule  -- 1 cap(s) by mouth 3 times a day  -- Indication: For constipation    senna oral tablet  -- 2 tab(s) by mouth once a day (at bedtime)  -- Indication: For constipation    MONTELUKAST SOD 10 MG TABLET  -- Indication: For COPD    MEMANTINE HCL 10 MG TABLET  -- Indication: For Dementia    VESIcare 5 mg oral tablet  -- 1 tab(s) by mouth once a day  4 pm  -- Indication: For Urinary incontinence I will START or STAY ON the medications listed below when I get home from the hospital:    acetaminophen 325 mg oral tablet  -- 1 tab(s) by mouth once, As Needed  Pain  -- Indication: For back pain    aspirin 81 mg oral tablet, chewable  -- 1 tab(s) by mouth once a day  -- Indication: For Peripheral arterial disease    CITALOPRAM HBR 40 MG TABLET  -- Indication: For Dementia    metformin 1000 mg oral tablet  -- 0.5 tab(500mg)(s) by mouth 2 times a day  -- Indication: For Diabetes    PRAVASTATIN SODIUM 40 MG TAB  -- Indication: For HLD    QUETIAPINE FUMARATE 25 MG TAB  -- Indication: For Dementia    donepezil 5 mg oral tablet  -- 1 tab(s) by mouth once a day (at bedtime)  -- Indication: For Dementia    collagenase 250 units/g topical ointment  -- 1 application on skin 3 times a day  -- Indication: For Decubitus ulcer of sacral region, stage 2    ranitidine 150 mg oral tablet  -- 1 tab(s) by mouth once a day  -- Indication: For need for prophylaxis    docusate sodium 100 mg oral capsule  -- 1 cap(s) by mouth 3 times a day  -- Indication: For constipation    senna oral tablet  -- 2 tab(s) by mouth once a day (at bedtime)  -- Indication: For constipation    MONTELUKAST SOD 10 MG TABLET  -- Indication: For COPD    MEMANTINE HCL 10 MG TABLET  -- Indication: For Dementia    VESIcare 5 mg oral tablet  -- 1 tab(s) by mouth once a day  4 pm  -- Indication: For Urinary incontinence

## 2017-05-18 NOTE — DISCHARGE NOTE ADULT - SECONDARY DIAGNOSIS.
Deep tissue injury UTI (urinary tract infection) Dementia MALIKA (acute kidney injury) Essential hypertension Diabetes mellitus, type 2

## 2017-05-18 NOTE — DISCHARGE NOTE ADULT - CARE PLAN
Principal Discharge DX:	Decubitus ulcer of sacral region, stage 2  Secondary Diagnosis:	Deep tissue injury  Secondary Diagnosis:	UTI (urinary tract infection)  Secondary Diagnosis:	Dementia  Secondary Diagnosis:	MALIKA (acute kidney injury)  Secondary Diagnosis:	Essential hypertension Principal Discharge DX:	Decubitus ulcer of sacral region, stage 2  Goal:	Wound care  Instructions for follow-up, activity and diet:	Patient was seen by surgery, no debridement or surgical intervention is needed.  Wound is not actively infected, will need daily wound care with santyl ointment and foam dressing.  Secondary Diagnosis:	Deep tissue injury  Secondary Diagnosis:	UTI (urinary tract infection)  Secondary Diagnosis:	Dementia  Secondary Diagnosis:	MALIKA (acute kidney injury)  Secondary Diagnosis:	Essential hypertension Principal Discharge DX:	Decubitus ulcer of sacral region, stage 2  Goal:	Wound care  Instructions for follow-up, activity and diet:	Patient was seen by surgery, no debridement or surgical intervention is needed.  Wound is not actively infected, no need for antibiotics but will need daily wound care with santyl ointment and foam dressing.  Frequent position change every 2 hrs.  Secondary Diagnosis:	Deep tissue injury  Goal:	Prevent decubitus ulcer.  Instructions for follow-up, activity and diet:	On bilateral heels. CHANDLER was performed and showed CHANDLER of 0.7(right) and 0.6(left), peripheral artery disease.  Will need medical management with aspirin and lipitor.  Secondary Diagnosis:	UTI (urinary tract infection)  Goal:	Treat infection  Instructions for follow-up, activity and diet:	Patient finished 3 day course of IV Rocephin. Urine culture was negative and patient is afebrile.  Secondary Diagnosis:	Dementia  Goal:	Prevent disease progression  Instructions for follow-up, activity and diet:	Continue home med Namenda, Donepezil, citalopram, and seroquel.  Secondary Diagnosis:	MALIKA (acute kidney injury)  Goal:	Improve renal function  Instructions for follow-up, activity and diet:	Patient received IV hydration since she was clinically dehydrated and Cr elevated to 1.8. Cr improved upon discharge, 1.08.  Secondary Diagnosis:	Essential hypertension  Goal:	Control blood pressure.  Instructions for follow-up, activity and diet:	Patient's blood pressure is running borderline, -110's. Hold Diovan, and monitor blood pressure medications.  Secondary Diagnosis:	Diabetes mellitus, type 2  Goal:	Control blood glucose  Instructions for follow-up, activity and diet:	Continue with home med metformin, blood glucose well controlled. Principal Discharge DX:	Decubitus ulcer of sacral region, stage 2  Goal:	Wound care  Instructions for follow-up, activity and diet:	Patient was seen by surgery, no debridement or surgical intervention is needed.  Wound is not actively infected, no need for antibiotics but will need daily wound care with santyl ointment and foam dressing.  Frequent position change every 2 hrs.  Secondary Diagnosis:	Deep tissue injury  Goal:	Prevent decubitus ulcer.  Instructions for follow-up, activity and diet:	On bilateral heels. CHANDLER was performed and showed CHANDLER of 0.7(right) and 0.6(left), peripheral artery disease.  Will need medical management with aspirin and lipitor.  Secondary Diagnosis:	UTI (urinary tract infection)  Goal:	Treat infection  Instructions for follow-up, activity and diet:	Patient finished 3 day course of IV Rocephin. Urine culture was negative and patient is afebrile.  Secondary Diagnosis:	Dementia  Goal:	Prevent disease progression  Instructions for follow-up, activity and diet:	Continue home med Namenda, Donepezil, citalopram, and seroquel.  Secondary Diagnosis:	MALIKA (acute kidney injury)  Goal:	Improve renal function  Instructions for follow-up, activity and diet:	Patient received IV hydration since she was clinically dehydrated and Cr elevated to 1.8. Cr improved upon discharge, 1.08.  Secondary Diagnosis:	Essential hypertension  Goal:	Control blood pressure.  Instructions for follow-up, activity and diet:	Patient's blood pressure is running borderline, -110's. Hold Diovan, and monitor blood pressure medications.  Secondary Diagnosis:	Diabetes mellitus, type 2  Goal:	Control blood glucose  Instructions for follow-up, activity and diet:	Continue with metformin 500mg 2 times a day, blood glucose well controlled.

## 2017-05-18 NOTE — DISCHARGE NOTE ADULT - MEDICATION SUMMARY - MEDICATIONS TO CHANGE
I will SWITCH the dose or number of times a day I take the medications listed below when I get home from the hospital:  None I will SWITCH the dose or number of times a day I take the medications listed below when I get home from the hospital:    metformin 1000 mg oral tablet  -- 1 tab(s) by mouth 2 times a day  Breakfast and Bedtime

## 2017-05-18 NOTE — PROGRESS NOTE ADULT - SUBJECTIVE AND OBJECTIVE BOX
Patient is a 82y old  Female who presents with a chief complaint of decubitus ulcer, caregiver exhaustion (17 May 2017 05:30)      INTERVAL HPI/OVERNIGHT EVENTS: back pain-improved.    T(C): 36.4, Max: 37.3 (05-17 @ 15:40)  HR: 72 (68 - 81)  BP: 124/84 (104/68 - 139/64)  RR: 16 (16 - 16)  SpO2: 95% (95% - 100%)      LABS:                        11.2   6.2   )-----------( 222      ( 18 May 2017 07:12 )             33.0     05-18    147<H>  |  115<H>  |  16  ----------------------------<  108<H>  4.7   |  22  |  1.24    Ca    9.8      18 May 2017 07:11    TPro  7.8  /  Alb  3.2<L>  /  TBili  0.4  /  DBili  x   /  AST  15  /  ALT  16  /  AlkPhos  61  05-16        CAPILLARY BLOOD GLUCOSE  127 (18 May 2017 08:15)  103 (17 May 2017 21:16)  116 (17 May 2017 16:28)  116 (17 May 2017 11:38)      MEDICATIONS  (STANDING):  citalopram 40milliGRAM(s) Oral daily  atorvastatin 10milliGRAM(s) Oral at bedtime  QUEtiapine 25milliGRAM(s) Oral at bedtime  donepezil 5milliGRAM(s) Oral at bedtime  famotidine    Tablet 20milliGRAM(s) Oral daily  montelukast 10milliGRAM(s) Oral at bedtime  heparin  Injectable 5000Unit(s) SubCutaneous every 12 hours  memantine 10milliGRAM(s) Oral every 12 hours  insulin lispro (HumaLOG) corrective regimen sliding scale  SubCutaneous three times a day before meals  cefTRIAXone   IVPB 1Gram(s) IV Intermittent every 24 hours  day #2  sodium chloride 0.9%. 1000milliLiter(s) IV Continuous <Continuous>  collagenase Ointment 1Application(s) Topical three times a day    MEDICATIONS  (PRN):  acetaminophen    Suspension. 650milliGRAM(s) Oral every 6 hours PRN Moderate Pain (4 - 6)  morphine  - Injectable 1milliGRAM(s) IV Push every 4 hours PRN Severe Pain (7 - 10)      RADIOLOGY & ADDITIONAL TESTS:    Imaging Personally Reviewed:  [ ] YES  [ ] NO    Consultant(s) Notes Reviewed:  [ ] YES  [ ] NO    REVIEW OF SYSTEMS:  CONSTITUTIONAL: No fever, weight loss, or fatigue  EYES: No eye pain, visual disturbances, or discharge  ENMT:  No difficulty hearing, tinnitus, vertigo; No sinus or throat pain  NECK: No pain or stiffness  BREASTS: No pain, masses, or nipple discharge  RESPIRATORY: No cough, wheezing, chills or hemoptysis; No shortness of breath  CARDIOVASCULAR: No chest pain, palpitations, dizziness, or leg swelling  GASTROINTESTINAL: No abdominal or epigastric pain. No nausea, vomiting, or hematemesis; No diarrhea or constipation. No melena or hematochezia.  GENITOURINARY: No dysuria, frequency, hematuria, or incontinence  NEUROLOGICAL: No headaches, memory loss, loss of strength, numbness, or tremors  SKIN: No itching, burning, rashes, or lesions   LYMPH NODES: No enlarged glands  ENDOCRINE: No heat or cold intolerance; No hair loss  MUSCULOSKELETAL: No joint pain or swelling; No muscle, back, or extremity pain  PSYCHIATRIC: No depression, anxiety, mood swings, or difficulty sleeping  HEME/LYMPH: No easy bruising, or bleeding gums  ALLERY AND IMMUNOLOGIC: No hives or eczema    PHYSICAL EXAM:  GENERAL: NAD, well-groomed, well-developed  HEAD:  Atraumatic, Normocephalic  EYES: EOMI, PERRLA, conjunctiva and sclera clear  ENMT: No tonsillar erythema, exudates, or enlargement; Moist mucous membranes, Good dentition, No lesions  NECK: Supple, No JVD, Normal thyroid  NERVOUS SYSTEM:  Alert & Oriented X3, Good concentration; Motor Strength 5/5 B/L upper and lower extremities; DTRs 2+ intact and symmetric  CHEST/LUNG: Clear to percussion bilaterally; No rales, rhonchi, wheezing, or rubs  HEART: Regular rate and rhythm; No murmurs, rubs, or gallops  ABDOMEN: Soft, Nontender, Nondistended; Bowel sounds present  EXTREMITIES:  2+ Peripheral Pulses bilaterally, No clubbing, cyanosis, or edema  LYMPH: No lymphadenopathy noted  SKIN: No rashes or lesions    Care Discussed with Consultants/Other Providers [ ] YES  [ ] NO Patient is a 82y old  Female who presents with a chief complaint of decubitus ulcer, caregiver exhaustion (17 May 2017 05:30)      INTERVAL HPI/OVERNIGHT EVENTS: back pain-improved.    T(C): 36.4, Max: 37.3 (05-17 @ 15:40)  HR: 72 (68 - 81)  BP: 124/84 (104/68 - 139/64)  RR: 16 (16 - 16)  SpO2: 95% (95% - 100%)      LABS:                        11.2   6.2   )-----------( 222      ( 18 May 2017 07:12 )             33.0     05-18    147<H>  |  115<H>  |  16  ----------------------------<  108<H>  4.7   |  22  |  1.24    Ca    9.8      18 May 2017 07:11    TPro  7.8  /  Alb  3.2<L>  /  TBili  0.4  /  DBili  x   /  AST  15  /  ALT  16  /  AlkPhos  61  05-16        CAPILLARY BLOOD GLUCOSE  127 (18 May 2017 08:15)  103 (17 May 2017 21:16)  116 (17 May 2017 16:28)  116 (17 May 2017 11:38)      MEDICATIONS  (STANDING):  citalopram 40milliGRAM(s) Oral daily  atorvastatin 10milliGRAM(s) Oral at bedtime  QUEtiapine 25milliGRAM(s) Oral at bedtime  donepezil 5milliGRAM(s) Oral at bedtime  famotidine    Tablet 20milliGRAM(s) Oral daily  montelukast 10milliGRAM(s) Oral at bedtime  heparin  Injectable 5000Unit(s) SubCutaneous every 12 hours  memantine 10milliGRAM(s) Oral every 12 hours  insulin lispro (HumaLOG) corrective regimen sliding scale  SubCutaneous three times a day before meals  cefTRIAXone   IVPB 1Gram(s) IV Intermittent every 24 hours  day #2  sodium chloride 0.9%. 1000milliLiter(s) IV Continuous <Continuous>  collagenase Ointment 1Application(s) Topical three times a day    MEDICATIONS  (PRN):  acetaminophen    Suspension. 650milliGRAM(s) Oral every 6 hours PRN Moderate Pain (4 - 6)  morphine  - Injectable 1milliGRAM(s) IV Push every 4 hours PRN Severe Pain (7 - 10)      REVIEW OF SYSTEMS:  CONSTITUTIONAL: No fever, weight loss, or fatigue  EYES: No eye pain, visual disturbances, or discharge  ENMT:  No difficulty hearing, tinnitus, vertigo; No sinus or throat pain  NECK: No pain or stiffness  BREASTS: No pain, masses, or nipple discharge  RESPIRATORY: No cough, wheezing, chills or hemoptysis; No shortness of breath  CARDIOVASCULAR: No chest pain, palpitations, dizziness, or leg swelling  GASTROINTESTINAL: No abdominal or epigastric pain. No nausea, vomiting, or hematemesis; No diarrhea or constipation. No melena or hematochezia.  GENITOURINARY: No dysuria, frequency, hematuria, or incontinence  NEUROLOGICAL: No headaches, memory loss, loss of strength, numbness, or tremors  SKIN: No itching, burning, rashes, or lesions   LYMPH NODES: No enlarged glands  ENDOCRINE: No heat or cold intolerance; No hair loss  MUSCULOSKELETAL: No joint pain or swelling; No muscle, back, or extremity pain  PSYCHIATRIC: No depression, anxiety, mood swings, or difficulty sleeping  HEME/LYMPH: No easy bruising, or bleeding gums  ALLERY AND IMMUNOLOGIC: No hives or eczema    PHYSICAL EXAM:  GENERAL: NAD, cachectic  HEAD:  Atraumatic, Normocephalic  EYES: EOMI, PERRLA, conjunctiva and sclera clear  ENMT: No tonsillar erythema, exudates, or enlargement; Moist mucous membranes  NECK: Supple, No JVD, Normal thyroid  NERVOUS SYSTEM:  Demented, alert and oriented x0  CHEST/LUNG: Clear to percussion bilaterally; No rales, rhonchi, wheezing, or rubs  HEART: Regular rate and rhythm; No murmurs, rubs, or gallops  ABDOMEN: Soft, Nontender, Nondistended; Bowel sounds present  EXTREMITIES:  2+ Peripheral Pulses bilaterally, bilateral heel decubitus DTI  LYMPH: No lymphadenopathy noted  SKIN: multiple sacral decubitus ulcers, various stage.      Consultant(s) Notes Reviewed:  [x ] YES  [ ] NO  Care Discussed with Consultants/Other Providers [x ] YES  [ ] NO Patient is a 82y old  Female who presents with a chief complaint of decubitus ulcer, caregiver exhaustion (17 May 2017 05:30)      INTERVAL HPI/OVERNIGHT EVENTS: back pain-improved.    T(C): 36.4, Max: 37.3 (05-17 @ 15:40)  HR: 72 (68 - 81)  BP: 124/84 (104/68 - 139/64)  RR: 16 (16 - 16)  SpO2: 95% (95% - 100%)      LABS:                        11.2   6.2   )-----------( 222      ( 18 May 2017 07:12 )             33.0     05-18    147<H>  |  115<H>  |  16  ----------------------------<  108<H>  4.7   |  22  |  1.24    Ca    9.8      18 May 2017 07:11    TPro  7.8  /  Alb  3.2<L>  /  TBili  0.4  /  DBili  x   /  AST  15  /  ALT  16  /  AlkPhos  61  05-16        CAPILLARY BLOOD GLUCOSE  127 (18 May 2017 08:15)  103 (17 May 2017 21:16)  116 (17 May 2017 16:28)  116 (17 May 2017 11:38)      MEDICATIONS  (STANDING):  citalopram 40milliGRAM(s) Oral daily  atorvastatin 10milliGRAM(s) Oral at bedtime  QUEtiapine 25milliGRAM(s) Oral at bedtime  donepezil 5milliGRAM(s) Oral at bedtime  famotidine    Tablet 20milliGRAM(s) Oral daily  montelukast 10milliGRAM(s) Oral at bedtime  heparin  Injectable 5000Unit(s) SubCutaneous every 12 hours  memantine 10milliGRAM(s) Oral every 12 hours  insulin lispro (HumaLOG) corrective regimen sliding scale  SubCutaneous three times a day before meals  cefTRIAXone   IVPB 1Gram(s) IV Intermittent every 24 hours  day #2  sodium chloride 0.9%. 1000milliLiter(s) IV Continuous <Continuous>  collagenase Ointment 1Application(s) Topical three times a day    MEDICATIONS  (PRN):  acetaminophen    Suspension. 650milliGRAM(s) Oral every 6 hours PRN Moderate Pain (4 - 6)  morphine  - Injectable 1milliGRAM(s) IV Push every 4 hours PRN Severe Pain (7 - 10)      REVIEW OF SYSTEMS:  CONSTITUTIONAL: No fever,; limited ROPM due to patients underlying dementia     PHYSICAL EXAM:  GENERAL: NAD, cachectic  HEAD:  Atraumatic, Normocephalic  EYES: EOMI, PERRLA, conjunctiva and sclera clear  ENMT: No tonsillar erythema, exudates, or enlargement; Moist mucous membranes  NECK: Supple, No JVD, Normal thyroid  NERVOUS SYSTEM:  Demented, alert and oriented x0  CHEST/LUNG: Clear to percussion bilaterally; No rales, rhonchi, wheezing, or rubs  HEART: Regular rate and rhythm; No murmurs, rubs, or gallops  ABDOMEN: Soft, Nontender, Nondistended; Bowel sounds present  EXTREMITIES:  2+ Peripheral Pulses bilaterally, bilateral heel decubitus DTI  LYMPH: No lymphadenopathy noted  SKIN: multiple sacral decubitus ulcers, various stage.      Consultant(s) Notes Reviewed:  [x ] YES  [ ] NO  Care Discussed with Consultants/Other Providers [x ] YES  [ ] NO

## 2017-05-18 NOTE — DISCHARGE NOTE ADULT - PLAN OF CARE
Wound care Patient was seen by surgery, no debridement or surgical intervention is needed.  Wound is not actively infected, will need daily wound care with santyl ointment and foam dressing. Prevent decubitus ulcer. On bilateral heels. CHANDLER was performed and showed CHANDLER of 0.7(right) and 0.6(left), peripheral artery disease.  Will need medical management with aspirin and lipitor. Treat infection Patient finished 3 day course of IV Rocephin. Urine culture was negative and patient is afebrile. Prevent disease progression Continue home med Namenda, Donepezil, citalopram, and seroquel. Improve renal function Patient received IV hydration since she was clinically dehydrated and Cr elevated to 1.8. Cr improved upon discharge, 1.08. Control blood pressure. Patient's blood pressure is running borderline, -110's. Hold Diovan, and monitor blood pressure medications. Control blood glucose Continue with home med metformin, blood glucose well controlled. Patient was seen by surgery, no debridement or surgical intervention is needed.  Wound is not actively infected, no need for antibiotics but will need daily wound care with santyl ointment and foam dressing.  Frequent position change every 2 hrs. Continue with metformin 500mg 2 times a day, blood glucose well controlled.

## 2017-05-18 NOTE — DISCHARGE NOTE ADULT - MEDICATION SUMMARY - MEDICATIONS TO STOP TAKING
I will STOP taking the medications listed below when I get home from the hospital:    Diovan 160 mg oral tablet  -- 1 tab(s) by mouth once a day  AM

## 2017-05-18 NOTE — DISCHARGE NOTE ADULT - PATIENT PORTAL LINK FT
“You can access the [facility] Patient Portal, offered by [facility], by registering with the following website: [email or web address]”

## 2017-05-18 NOTE — DISCHARGE NOTE ADULT - HOSPITAL COURSE
82 year-old Female from home, lives with daughter, with PMH of dementia AOx0-1, HTN, bedbound, and DM was brought to ER by daughter for concern for worsening sacral ulcer. Patient's daughter said that she was unable to take care of her appropriately and felt she needed more help on her care. As per daughter, Pt had been complaining of worsening pain to sacrum area recently. Pt's daughter stated that she has made multiple pleas with PMD to get more help to point where daughter cannot physically be able to care of mother and needs assistance. Patient was found to have various stages of sacral ulcer(but most part was stage 2) but no leukocytosis or fever was noted since admission. UA was also positive for leukocyte esterase and moderate bacteria so ceftriaxone 1g was started. Was admitted to the medical floor for care of sacral ulcer and UTI, and possible placement to nursing home.     # Sacral wound: various stage, some portion of the wound is covered with eschar, but most of the lesions are stage 2. Largest on right upper buttock 5cm x 5.2cm with no active discharge or bleeding, unstageable(escharous postion noted). Left buttock 2cm x 1.7cm stage 3, right lower buttock  stage 2, 4cm x 1cm. Coccyx stage 2 2.5cm x 4cm. Surgery was consulted for wound care, however no surgical intervention was required. On admission, inflammatory markers were mildly elevated, ESR 59, CRP 2.7. No need for MRI of the sacrum or broad spectrum antibiotics as per attending Dr. Menendez, as wound does not look actively infected.    Bilateral heels deep tissue injury was also noted so Dr. Nails was consulted. He recommended arterial doppler to be done for further evaluation. Leg elevation, and off loading boots was applied.     # UTI: Initial UA was positive however follow-up urine culture didn't grow any bacteria. Patient received 3 doses of ceftriaxone 1g IV daily.     #MALIKA: Initial Cr was 1.87, likely from UTI and dehydration. Pt received IV fluid for 1 day and follow up creatinine level improved(1.24) next day.    # Social issue: Social work follow up for long term placement. Out of bed to chair, PT consultation was done: Sub- Acute Rehab. 82 year-old Female from home, lives with daughter, with PMH of dementia AOx0-1, HTN, bedbound, and DM was brought to ER by daughter for concern for worsening sacral ulcer. Patient's daughter said that she was unable to take care of her appropriately and felt she needed more help on her care. As per daughter, Pt had been complaining of worsening pain to sacrum area recently. Pt's daughter stated that she has made multiple pleas with PMD to get more help to point where daughter cannot physically be able to care of mother and needs assistance. Patient was found to have various stages of sacral ulcer(but most part was stage 2) but no leukocytosis or fever was noted since admission. UA was also positive for leukocyte esterase and moderate bacteria so ceftriaxone 1g was started. Was admitted to the medical floor for care of sacral ulcer and UTI, and need for placement to nursing home.     # Sacral wound: various stage, some portion of the wound is covered with eschar, but most of the lesions are stage 2. Largest on right upper buttock 5cm x 5.2cm with no active discharge or bleeding, unstageable(escharous postion noted). Left buttock 2cm x 1.7cm stage 3, right lower buttock  stage 2, 4cm x 1cm. Coccyx stage 2 2.5cm x 4cm. Surgery was consulted for wound care, however no surgical intervention was required. On admission, inflammatory markers were mildly elevated, ESR 59, CRP 2.7. No need for MRI of the sacrum or broad spectrum antibiotics as per attending Dr. Menendez, as wound does not look actively infected.     Bilateral heels deep tissue injury was also noted so Dr. Nails was consulted. He recommended arterial doppler to be done for further evaluation. Leg elevation, and off loading boots was applied. CHANDLER showed mild peripheral artery disease, 0.7 on right side and 0.6 on left side. Will continue medical management with aspirin and lipitor at this time as per attending physician. Patient will need frequent position change and off loading boots while staying on the bed. Santyl ointment and foam gauze apply to the sacral ulcer site.    # UTI: Initial UA was positive however follow-up urine culture didn't grow any bacteria. Patient received 3 doses of ceftriaxone 1g IV daily. Afebrile and she does not have leukocytosis.    #MALIKA: Initial Cr was 1.87, likely from UTI and dehydration. Pt received IV fluid for 1 day and follow up creatinine level improved(1.24) next day. Upon discharge Cr is 1.08. Condition improved, will need to keep good     # Social issue: Social work follow up for long term placement. Out of bed to chair, PT consultation was done: Sub- Acute Rehab. 82 year-old Female from home, lives with daughter, with PMH of dementia AOx0-1, HTN, bedbound, and DM was brought to ER by daughter for concern for worsening sacral ulcer. Patient's daughter said that she was unable to take care of her appropriately and felt she needed more help on her care. As per daughter, Pt had been complaining of worsening pain to sacrum area recently. Pt's daughter stated that she has made multiple pleas with PMD to get more help to point where daughter cannot physically be able to care of mother and needs assistance. Patient was found to have various stages of sacral ulcer(but most part was stage 2) but no leukocytosis or fever was noted since admission. UA was also positive for leukocyte esterase and moderate bacteria so ceftriaxone 1g was started. Was admitted to the medical floor for care of sacral ulcer and UTI, and need for placement to nursing home.     # Sacral wound: various stage, some portion of the wound is covered with eschar, but most of the lesions are stage 2. Largest on right upper buttock 5cm x 5.2cm with no active discharge or bleeding, unstageable(escharous postion noted). Left buttock 2cm x 1.7cm stage 3, right lower buttock  stage 2, 4cm x 1cm. Coccyx stage 2 2.5cm x 4cm. Surgery was consulted for wound care, however no surgical intervention was required. On admission, inflammatory markers were mildly elevated, ESR 59, CRP 2.7. No need for MRI of the sacrum or broad spectrum antibiotics as per attending Dr. Menendez, as wound does not look actively infected.     Bilateral heels deep tissue injury was also noted so Dr. Nails was consulted. He recommended arterial doppler to be done for further evaluation. Leg elevation, and off loading boots was applied. CHANDLER showed mild peripheral artery disease, 0.7 on right side and 0.6 on left side. Will continue medical management with aspirin and lipitor at this time as per attending physician. Patient will need frequent position change and off loading boots while staying on the bed. Santyl ointment and foam gauze apply to the sacral ulcer site.    # UTI: Initial UA was positive however follow-up urine culture didn't grow any bacteria. Patient received 3 doses of ceftriaxone 1g IV daily. Afebrile and she does not have leukocytosis.    # MALIKA: Initial Cr was 1.87, likely from UTI and dehydration. Pt received IV fluid for 1 day and follow up creatinine level improved(1.24) next day. Upon discharge Cr is 1.08. Condition improved, will need to keep good oral hydration.    #DM: Patient's Hba1c is 5.8, blood glucose is well controlled. Patient can continue metformin at home dose upon discharge. Will need nutritional support with Glucerna.    #dementia: Patient is calm and not agitated, no behavioral issue was noted. Can continue home med, seroquel, citalopram, namenda, and donepezil.    #HTN: Blood pressure has been running borderline, will hold home valsartan upon discharge. Monitor blood pressure daily at nursing home.    Social work was consulted for long term placement. Out of bed to chair, PT consultation was done, and recommended Sub- Acute Rehab. Patient will need physical therapy in a regular basis. Currently patient is medically stable to be discharged. 82 year-old Female from home, lives with daughter, with PMH of dementia AOx0-1, HTN, bedbound, and DM was brought to ER by daughter for concern for worsening sacral ulcer. Patient's daughter said that she was unable to take care of her appropriately and felt she needed more help on her care. As per daughter, Pt had been complaining of worsening pain to sacrum area recently. Pt's daughter stated that she has made multiple pleas with PMD to get more help to point where daughter cannot physically be able to care of mother and needs assistance. Patient was found to have various stages of sacral ulcer(but most part was stage 2) but no leukocytosis or fever was noted since admission. UA was also positive for leukocyte esterase and moderate bacteria so ceftriaxone 1g was started. Was admitted to the medical floor for care of sacral ulcer and UTI, and need for placement to nursing home.     # Sacral wound: various stage, some portion of the wound is covered with eschar, but most of the lesions are stage 2. Largest on right upper buttock 5cm x 5.2cm with no active discharge or bleeding, unstageable(escharous postion noted). Left buttock 2cm x 1.7cm stage 3, right lower buttock  stage 2, 4cm x 1cm. Coccyx stage 2 2.5cm x 4cm. Surgery was consulted for wound care, however no surgical intervention was required. On admission, inflammatory markers were mildly elevated, ESR 59, CRP 2.7. No need for MRI of the sacrum or broad spectrum antibiotics as per attending Dr. Menendez, as wound does not look actively infected.     Bilateral heels deep tissue injury was also noted so Dr. Nails was consulted. He recommended arterial doppler to be done for further evaluation. Leg elevation, and off loading boots was applied. CHANDLER showed mild peripheral artery disease, 0.7 on right side and 0.6 on left side. Will continue medical management with aspirin and lipitor at this time as per attending physician. Patient will need frequent position change and off loading boots while staying on the bed. Santyl ointment and foam gauze apply to the sacral ulcer site.    # UTI: Initial UA was positive however follow-up urine culture didn't grow any bacteria. Patient received 3 doses of ceftriaxone 1g IV daily. Afebrile and she does not have leukocytosis.    # MALIKA: Initial Cr was 1.87, likely from UTI and dehydration. Pt received IV fluid for 1 day and follow up creatinine level improved(1.24) next day. Upon discharge Cr is 1.08. Condition improved, will need to keep good oral hydration.    #DM: Patient's Hba1c is 5.8, blood glucose is well controlled. Patient can continue metformin 500mg 2 times a day(dose decreased) upon discharge. Will need nutritional support with Glucerna.    #dementia: Patient is calm and not agitated, no behavioral issue was noted. Can continue home med, seroquel, citalopram, namenda, and donepezil.    #HTN: Blood pressure has been running borderline, will hold home valsartan upon discharge. Monitor blood pressure daily at nursing home.    Social work was consulted for long term placement. Out of bed to chair, PT consultation was done, and recommended Sub- Acute Rehab. Patient will need physical therapy in a regular basis. Currently patient is medically stable to be discharged.

## 2017-05-19 VITALS
SYSTOLIC BLOOD PRESSURE: 123 MMHG | DIASTOLIC BLOOD PRESSURE: 78 MMHG | TEMPERATURE: 98 F | HEART RATE: 97 BPM | RESPIRATION RATE: 16 BRPM | OXYGEN SATURATION: 97 %

## 2017-05-19 DIAGNOSIS — R53.2 FUNCTIONAL QUADRIPLEGIA: ICD-10-CM

## 2017-05-19 LAB
ANION GAP SERPL CALC-SCNC: 10 MMOL/L — SIGNIFICANT CHANGE UP (ref 5–17)
BUN SERPL-MCNC: 17 MG/DL — SIGNIFICANT CHANGE UP (ref 7–18)
CALCIUM SERPL-MCNC: 9.3 MG/DL — SIGNIFICANT CHANGE UP (ref 8.4–10.5)
CHLORIDE SERPL-SCNC: 114 MMOL/L — HIGH (ref 96–108)
CO2 SERPL-SCNC: 20 MMOL/L — LOW (ref 22–31)
CREAT SERPL-MCNC: 1.08 MG/DL — SIGNIFICANT CHANGE UP (ref 0.5–1.3)
GLUCOSE SERPL-MCNC: 101 MG/DL — HIGH (ref 70–99)
MAGNESIUM SERPL-MCNC: 2.3 MG/DL — SIGNIFICANT CHANGE UP (ref 1.6–2.6)
PHOSPHATE SERPL-MCNC: 3.1 MG/DL — SIGNIFICANT CHANGE UP (ref 2.5–4.5)
POTASSIUM SERPL-MCNC: 4 MMOL/L — SIGNIFICANT CHANGE UP (ref 3.5–5.3)
POTASSIUM SERPL-SCNC: 4 MMOL/L — SIGNIFICANT CHANGE UP (ref 3.5–5.3)
SODIUM SERPL-SCNC: 144 MMOL/L — SIGNIFICANT CHANGE UP (ref 135–145)

## 2017-05-19 PROCEDURE — 82306 VITAMIN D 25 HYDROXY: CPT

## 2017-05-19 PROCEDURE — 83690 ASSAY OF LIPASE: CPT

## 2017-05-19 PROCEDURE — 85730 THROMBOPLASTIN TIME PARTIAL: CPT

## 2017-05-19 PROCEDURE — 83036 HEMOGLOBIN GLYCOSYLATED A1C: CPT

## 2017-05-19 PROCEDURE — 84443 ASSAY THYROID STIM HORMONE: CPT

## 2017-05-19 PROCEDURE — 80061 LIPID PANEL: CPT

## 2017-05-19 PROCEDURE — 99285 EMERGENCY DEPT VISIT HI MDM: CPT | Mod: 25

## 2017-05-19 PROCEDURE — 80048 BASIC METABOLIC PNL TOTAL CA: CPT

## 2017-05-19 PROCEDURE — 86140 C-REACTIVE PROTEIN: CPT

## 2017-05-19 PROCEDURE — 84100 ASSAY OF PHOSPHORUS: CPT

## 2017-05-19 PROCEDURE — 93925 LOWER EXTREMITY STUDY: CPT

## 2017-05-19 PROCEDURE — 81001 URINALYSIS AUTO W/SCOPE: CPT

## 2017-05-19 PROCEDURE — 85027 COMPLETE CBC AUTOMATED: CPT

## 2017-05-19 PROCEDURE — 87040 BLOOD CULTURE FOR BACTERIA: CPT

## 2017-05-19 PROCEDURE — 97162 PT EVAL MOD COMPLEX 30 MIN: CPT

## 2017-05-19 PROCEDURE — 82607 VITAMIN B-12: CPT

## 2017-05-19 PROCEDURE — 71045 X-RAY EXAM CHEST 1 VIEW: CPT

## 2017-05-19 PROCEDURE — 99239 HOSP IP/OBS DSCHRG MGMT >30: CPT

## 2017-05-19 PROCEDURE — 83735 ASSAY OF MAGNESIUM: CPT

## 2017-05-19 PROCEDURE — 85652 RBC SED RATE AUTOMATED: CPT

## 2017-05-19 PROCEDURE — 93005 ELECTROCARDIOGRAM TRACING: CPT

## 2017-05-19 PROCEDURE — 85610 PROTHROMBIN TIME: CPT

## 2017-05-19 PROCEDURE — 80053 COMPREHEN METABOLIC PANEL: CPT

## 2017-05-19 PROCEDURE — 87086 URINE CULTURE/COLONY COUNT: CPT

## 2017-05-19 RX ORDER — COLLAGENASE CLOSTRIDIUM HIST. 250 UNIT/G
1 OINTMENT (GRAM) TOPICAL
Qty: 0 | Refills: 0 | COMMUNITY
Start: 2017-05-19

## 2017-05-19 RX ORDER — ASPIRIN/CALCIUM CARB/MAGNESIUM 324 MG
81 TABLET ORAL DAILY
Qty: 0 | Refills: 0 | Status: DISCONTINUED | OUTPATIENT
Start: 2017-05-19 | End: 2017-05-19

## 2017-05-19 RX ORDER — ASPIRIN/CALCIUM CARB/MAGNESIUM 324 MG
1 TABLET ORAL
Qty: 0 | Refills: 0 | COMMUNITY
Start: 2017-05-19

## 2017-05-19 RX ADMIN — Medication 81 MILLIGRAM(S): at 11:26

## 2017-05-19 RX ADMIN — MEMANTINE HYDROCHLORIDE 10 MILLIGRAM(S): 10 TABLET ORAL at 05:47

## 2017-05-19 RX ADMIN — Medication 1 APPLICATION(S): at 14:55

## 2017-05-19 RX ADMIN — Medication 1 APPLICATION(S): at 05:48

## 2017-05-19 RX ADMIN — FAMOTIDINE 20 MILLIGRAM(S): 10 INJECTION INTRAVENOUS at 11:24

## 2017-05-19 RX ADMIN — HEPARIN SODIUM 5000 UNIT(S): 5000 INJECTION INTRAVENOUS; SUBCUTANEOUS at 05:47

## 2017-05-19 RX ADMIN — CITALOPRAM 40 MILLIGRAM(S): 10 TABLET, FILM COATED ORAL at 11:26

## 2017-05-19 RX ADMIN — CEFTRIAXONE 100 GRAM(S): 500 INJECTION, POWDER, FOR SOLUTION INTRAMUSCULAR; INTRAVENOUS at 11:25

## 2017-05-19 NOTE — PROGRESS NOTE ADULT - PROBLEM SELECTOR PLAN 5
-Continue with donepezil, memantine, and Celexa.   -No noted agitated behavior at this time.  -Social work follow up for long term placement.
-Continue with donepezil, memantine, and celexa.   -No behavioral disturbance.  -Social work follow up for long term placement.

## 2017-05-19 NOTE — PROGRESS NOTE ADULT - PROBLEM SELECTOR PLAN 2
- DTIs of bilateral heels- seen for follow up by Dr. Nails.  -c/w Off-loading boots
-Bilateral heels, seen by Dr. Nails.  -Apply Off-loading boots  -Will check arterial doppler as recommended by Dr. Nails.

## 2017-05-19 NOTE — PROGRESS NOTE ADULT - SUBJECTIVE AND OBJECTIVE BOX
83y/o female seen and examined at bedside in NAD for follow up bilateral heel DTI. No new compliants. No events overnight    VITALS:  Temperature   Temp (F) : 98.6 Degrees F  Heart Rate (beats/min) : 74 /min   Systolic Systolic : 104 mm Hg   Diastolic (mm Hg) : 64 mm Hg   Respiration Rate (breaths/min) : 16 /min   SpO2 (%) : 100 %        DERM: Skin is cool and dry. Deep tissue injuries to bilateral heel, right measurement remains the same 5.1 cm x 4.9 cm with dry hard necrotic tissue/eschar covering with dark, hyperpigmentation. No drainage, no purulent, no malodor, no erythema. Left heel decubitus ulcer DTI, measurement remain the same 5.3cm x 5.0 cm with fluctuant eschar covering with dark, hyperpigmentation. No drainage, no purulent, no malodor, no erythema.    VASC: Non-palpable pedal pulses bilateral, no edema    NEURO: unable to perform, pt on co-operating    M/S: Dorsally contracted digits 2-5 bilateral, Lateral deviated hallux bilateral     CHANDLER:   Right 0.74 and left 0.67          Assessment and Recommendation:   Bilateral heel decubitus DTI  -Protective dressing to bilateral heel. Continue with Z-flex or multipodus singer to both heels  -Elevate bilateral LE and off load bilateral heel  Consult Vascular for evaluation and possible intervention( CHANDLER)  -Will continue to monitor

## 2017-05-19 NOTE — PROGRESS NOTE ADULT - SUBJECTIVE AND OBJECTIVE BOX
Patient is a 82y old  Female who presents with a chief complaint of decubitus ulcer, caregiver exhaustion (18 May 2017 16:01)      INTERVAL HPI/OVERNIGHT EVENTS: No new complaints    MEDICATIONS  (STANDING):  citalopram 40milliGRAM(s) Oral daily  atorvastatin 10milliGRAM(s) Oral at bedtime  QUEtiapine 25milliGRAM(s) Oral at bedtime  donepezil 5milliGRAM(s) Oral at bedtime  famotidine    Tablet 20milliGRAM(s) Oral daily  montelukast 10milliGRAM(s) Oral at bedtime  heparin  Injectable 5000Unit(s) SubCutaneous every 12 hours  memantine 10milliGRAM(s) Oral every 12 hours  insulin lispro (HumaLOG) corrective regimen sliding scale  SubCutaneous three times a day before meals  cefTRIAXone   IVPB 1Gram(s) IV Intermittent every 24 hours  collagenase Ointment 1Application(s) Topical three times a day  aspirin  chewable 81milliGRAM(s) Oral daily    MEDICATIONS  (PRN):  acetaminophen    Suspension. 650milliGRAM(s) Oral every 6 hours PRN Moderate Pain (4 - 6)  morphine  - Injectable 1milliGRAM(s) IV Push every 4 hours PRN Severe Pain (7 - 10)      Allergies    penicillin (Rash)  sulfa drugs (Unknown)    Intolerances        REVIEW OF SYSTEMS:  CONSTITUTIONAL: No fever, ROS limited as patient is a poor historian with advanced dementia    Vital Signs Last 24 Hrs  T(C): 37, Max: 37 (05-19 @ 05:12)  T(F): 98.6, Max: 98.6 (05-19 @ 05:12)  HR: 74 (74 - 95)  BP: 104/64 (104/64 - 139/73)  BP(mean): --  RR: 16 (16 - 16)  SpO2: 100% (96% - 100%)    PHYSICAL EXAM:  GENERAL: NAD  HEAD: Normocephalic  EYES:  conjunctiva and sclera clear  NECK: Supple  NERVOUS SYSTEM:  Alert and awake  CHEST/LUNG: Clear to percussion bilaterally; No rales, no wheezing   HEART: S1 S2+; Regular rate and rhythm  ABDOMEN: Soft, Nontender, Nondistended; Bowel sounds present  EXTREMITIES: no calf tenderness  SKIN: No rashes; sacral pressure ulcers at different stages some healing.; bilateral heel DTI    LABS:                        11.2   6.2   )-----------( 222      ( 18 May 2017 07:12 )             33.0     05-19    144  |  114<H>  |  17  ----------------------------<  101<H>  4.0   |  20<L>  |  1.08    Ca    9.3      19 May 2017 06:30  Phos  3.1     05-19  Mg     2.3     05-19          CAPILLARY BLOOD GLUCOSE  116 (19 May 2017 07:53)  126 (18 May 2017 20:42)  158 (18 May 2017 16:32)      RADIOLOGY & ADDITIONAL TESTS:      Consultant(s) Notes Reviewed:   YES - podiatrist    Care Discussed with Consultants/Other Providers  YES

## 2017-05-19 NOTE — PROGRESS NOTE ADULT - ATTENDING COMMENTS
Patient was seen and examined by myself with team. Case was discussed with house staff in details.  I called and spoke to daughter this morning about patient medical condition and overall status. Also expressed that based on my discussion with discharge planner, patient is not accepted to her 1st choice rehab; she then gave other choices. This was communicated to discharge planning team. Plan for discharge to SNF when bed available. She is medically stable for discharge at this time.  About 38 mins spent to coordinate discharge plan.
Patient was seen and examined by myself with team. Case was discussed with house staff in details.  Patient is an elderly female with progression of advanced dementia and also chronic sacral pressure ulcer  - supportive measures  - local wound acre  - discharge planning with eventual long tern care placement.  Agree with Dr massey note above with edits/ corrections.  plan as outlined

## 2017-05-19 NOTE — PROGRESS NOTE ADULT - PROBLEM SELECTOR PLAN 6
Patient with advanced dementia and probably has progressed to functional quadriplegic state as she is requiring maximum assistance with all mobility activities and also dependent for feeding.
-Continue with heparin subcu q 12hrs for VTE prophlylaxis.

## 2017-05-19 NOTE — PROGRESS NOTE ADULT - PROBLEM SELECTOR PLAN 1
- remain stable  - c/w Daily Wound dressing  -Frequent position change.
-ESR 59, CRP 2.7, no fever or leukocytosis.  - Daily Wound dressing, surgery was consulted and said no debridement or further intervention is needed.  -Frequent position change.

## 2017-05-19 NOTE — PROGRESS NOTE ADULT - ASSESSMENT
82y old  Female  with advanced dementia and inability to complete ADLS by herself admitted for UTI, pressure ulcers and progressive functional decline due to dementia

## 2017-05-19 NOTE — PROGRESS NOTE ADULT - PROBLEM SELECTOR PLAN 4
-Cr improved with IV hydration.  -Monitor renal function and avoid nephrotoxins.
-Cr improved with IV hydration.  -Monitor renal function and avoid nephrotoxins.

## 2017-05-19 NOTE — CHART NOTE - NSCHARTNOTEFT_GEN_A_CORE
Upon Nutritional Assessment by the Registered Dietitian your patient was determined to meet criteria / has evidence of the following diagnosis/diagnoses:          [ ]  Mild Protein Calorie Malnutrition        [ x]  Moderate Protein Calorie Malnutrition        [ ] Severe Protein Calorie Malnutrition        [ ] Unspecified Protein Calorie Malnutrition        [ ] Underweight / BMI <19        [ ] Morbid Obesity / BMI > 40      Findings as based on:  •  Comprehensive nutrition assessment and consultation  •  Calorie counts (nutrient intake analysis)  •  Food acceptance and intake status from observations by staff  •  Follow up  •  Patient education  •  Intervention secondary to interdisciplinary rounds  •   concerns      Treatment:    The following diet has been recommended:  CONTINUE WITH ENSURE ENLIVE TID.  MVIM,VIT C 500 MG BID,ZNSO4 220 MG ONCE ADAY  D/C DASH/TLC DIET (LEAST RESTRICTIVE DIET)      PROVIDER Section:     By signing this assessment you are acknowledging and agree with the diagnosis/diagnoses assigned by the Registered Dietitian    Comments:

## 2017-05-19 NOTE — DIETITIAN INITIAL EVALUATION ADULT. - OTHER INFO
Pt visited. Alert but confused. D/W RN. Calorie count in  progress.Appetite Varies.Pt does drink Ensure Enlive.

## 2017-05-19 NOTE — PROGRESS NOTE ADULT - PROBLEM SELECTOR PLAN 3
d/c antibiotics after today's dose
-Pending urine and blood culture result.  -Patient is afebrile and has no leukocytosis.  -Will continue ceftriaxone 1g IV q 24hrs, day #2.

## 2017-05-20 LAB — 24R-OH-CALCIDIOL SERPL-MCNC: 37.1 NG/ML — SIGNIFICANT CHANGE UP (ref 30–100)

## 2017-05-22 LAB
CULTURE RESULTS: SIGNIFICANT CHANGE UP
CULTURE RESULTS: SIGNIFICANT CHANGE UP
SPECIMEN SOURCE: SIGNIFICANT CHANGE UP
SPECIMEN SOURCE: SIGNIFICANT CHANGE UP

## 2017-05-23 DIAGNOSIS — Z88.0 ALLERGY STATUS TO PENICILLIN: ICD-10-CM

## 2017-05-23 DIAGNOSIS — Z88.2 ALLERGY STATUS TO SULFONAMIDES: ICD-10-CM

## 2017-05-23 DIAGNOSIS — E86.0 DEHYDRATION: ICD-10-CM

## 2017-05-23 DIAGNOSIS — Z96.653 PRESENCE OF ARTIFICIAL KNEE JOINT, BILATERAL: ICD-10-CM

## 2017-05-23 DIAGNOSIS — J44.9 CHRONIC OBSTRUCTIVE PULMONARY DISEASE, UNSPECIFIED: ICD-10-CM

## 2017-05-23 DIAGNOSIS — N39.0 URINARY TRACT INFECTION, SITE NOT SPECIFIED: ICD-10-CM

## 2017-05-23 DIAGNOSIS — L89.620 PRESSURE ULCER OF LEFT HEEL, UNSTAGEABLE: ICD-10-CM

## 2017-05-23 DIAGNOSIS — L89.312 PRESSURE ULCER OF RIGHT BUTTOCK, STAGE 2: ICD-10-CM

## 2017-05-23 DIAGNOSIS — F03.90 UNSPECIFIED DEMENTIA, UNSPECIFIED SEVERITY, WITHOUT BEHAVIORAL DISTURBANCE, PSYCHOTIC DISTURBANCE, MOOD DISTURBANCE, AND ANXIETY: ICD-10-CM

## 2017-05-23 DIAGNOSIS — Z74.01 BED CONFINEMENT STATUS: ICD-10-CM

## 2017-05-23 DIAGNOSIS — L89.152 PRESSURE ULCER OF SACRAL REGION, STAGE 2: ICD-10-CM

## 2017-05-23 DIAGNOSIS — E11.51 TYPE 2 DIABETES MELLITUS WITH DIABETIC PERIPHERAL ANGIOPATHY WITHOUT GANGRENE: ICD-10-CM

## 2017-05-23 DIAGNOSIS — L89.322 PRESSURE ULCER OF LEFT BUTTOCK, STAGE 2: ICD-10-CM

## 2017-05-23 DIAGNOSIS — I10 ESSENTIAL (PRIMARY) HYPERTENSION: ICD-10-CM

## 2017-05-23 DIAGNOSIS — R53.2 FUNCTIONAL QUADRIPLEGIA: ICD-10-CM

## 2017-05-23 DIAGNOSIS — Z79.84 LONG TERM (CURRENT) USE OF ORAL HYPOGLYCEMIC DRUGS: ICD-10-CM

## 2017-05-23 DIAGNOSIS — L89.610 PRESSURE ULCER OF RIGHT HEEL, UNSTAGEABLE: ICD-10-CM

## 2017-05-23 DIAGNOSIS — R32 UNSPECIFIED URINARY INCONTINENCE: ICD-10-CM

## 2017-05-23 DIAGNOSIS — N17.9 ACUTE KIDNEY FAILURE, UNSPECIFIED: ICD-10-CM

## 2017-05-23 DIAGNOSIS — E78.5 HYPERLIPIDEMIA, UNSPECIFIED: ICD-10-CM

## 2018-03-11 ENCOUNTER — INPATIENT (INPATIENT)
Facility: HOSPITAL | Age: 83
LOS: 8 days | Discharge: HOME | DRG: 853 | End: 2018-03-20
Attending: INTERNAL MEDICINE | Admitting: INTERNAL MEDICINE
Payer: MEDICARE

## 2018-03-11 VITALS
HEART RATE: 108 BPM | OXYGEN SATURATION: 96 % | WEIGHT: 89.95 LBS | RESPIRATION RATE: 20 BRPM | DIASTOLIC BLOOD PRESSURE: 56 MMHG | SYSTOLIC BLOOD PRESSURE: 90 MMHG

## 2018-03-11 DIAGNOSIS — N17.9 ACUTE KIDNEY FAILURE, UNSPECIFIED: ICD-10-CM

## 2018-03-11 DIAGNOSIS — E13.10 OTHER SPECIFIED DIABETES MELLITUS WITH KETOACIDOSIS WITHOUT COMA: ICD-10-CM

## 2018-03-11 DIAGNOSIS — A41.9 SEPSIS, UNSPECIFIED ORGANISM: ICD-10-CM

## 2018-03-11 DIAGNOSIS — E11.01 TYPE 2 DIABETES MELLITUS WITH HYPEROSMOLARITY WITH COMA: ICD-10-CM

## 2018-03-11 DIAGNOSIS — R62.7 ADULT FAILURE TO THRIVE: ICD-10-CM

## 2018-03-11 DIAGNOSIS — E87.5 HYPERKALEMIA: ICD-10-CM

## 2018-03-11 DIAGNOSIS — E87.0 HYPEROSMOLALITY AND HYPERNATREMIA: ICD-10-CM

## 2018-03-11 DIAGNOSIS — Z71.89 OTHER SPECIFIED COUNSELING: ICD-10-CM

## 2018-03-11 DIAGNOSIS — Z29.9 ENCOUNTER FOR PROPHYLACTIC MEASURES, UNSPECIFIED: ICD-10-CM

## 2018-03-11 LAB
ACETONE SERPL-MCNC: ABNORMAL
ALBUMIN SERPL ELPH-MCNC: 1.6 G/DL — LOW (ref 3.5–5)
ALBUMIN SERPL ELPH-MCNC: 2.4 G/DL — LOW (ref 3.5–5)
ALP SERPL-CCNC: 100 U/L — SIGNIFICANT CHANGE UP (ref 40–120)
ALP SERPL-CCNC: 69 U/L — SIGNIFICANT CHANGE UP (ref 40–120)
ALT FLD-CCNC: 29 U/L DA — SIGNIFICANT CHANGE UP (ref 10–60)
ALT FLD-CCNC: 39 U/L DA — SIGNIFICANT CHANGE UP (ref 10–60)
ANION GAP SERPL CALC-SCNC: 11 MMOL/L — SIGNIFICANT CHANGE UP (ref 5–17)
ANION GAP SERPL CALC-SCNC: 17 MMOL/L — SIGNIFICANT CHANGE UP (ref 5–17)
ANISOCYTOSIS BLD QL: SLIGHT — SIGNIFICANT CHANGE UP
APPEARANCE UR: ABNORMAL
AST SERPL-CCNC: 28 U/L — SIGNIFICANT CHANGE UP (ref 10–40)
AST SERPL-CCNC: 28 U/L — SIGNIFICANT CHANGE UP (ref 10–40)
BACTERIA # UR AUTO: ABNORMAL /HPF
BASE EXCESS BLDA CALC-SCNC: -11.2 MMOL/L — LOW (ref -2–2)
BASOPHILS # BLD AUTO: 0 K/UL — SIGNIFICANT CHANGE UP (ref 0–0.2)
BASOPHILS NFR BLD AUTO: 0.2 % — SIGNIFICANT CHANGE UP (ref 0–2)
BILIRUB SERPL-MCNC: 0.2 MG/DL — SIGNIFICANT CHANGE UP (ref 0.2–1.2)
BILIRUB SERPL-MCNC: 0.3 MG/DL — SIGNIFICANT CHANGE UP (ref 0.2–1.2)
BILIRUB UR-MCNC: ABNORMAL
BLOOD GAS COMMENTS ARTERIAL: SIGNIFICANT CHANGE UP
BUN SERPL-MCNC: 118 MG/DL — HIGH (ref 7–18)
BUN SERPL-MCNC: 153 MG/DL — HIGH (ref 7–18)
CALCIUM SERPL-MCNC: 8.1 MG/DL — LOW (ref 8.4–10.5)
CALCIUM SERPL-MCNC: 9.8 MG/DL — SIGNIFICANT CHANGE UP (ref 8.4–10.5)
CHLORIDE SERPL-SCNC: 121 MMOL/L — HIGH (ref 96–108)
CHLORIDE SERPL-SCNC: 135 MMOL/L — HIGH (ref 96–108)
CK SERPL-CCNC: 379 U/L — HIGH (ref 21–215)
CO2 SERPL-SCNC: 16 MMOL/L — LOW (ref 22–31)
CO2 SERPL-SCNC: 18 MMOL/L — LOW (ref 22–31)
COLOR SPEC: YELLOW — SIGNIFICANT CHANGE UP
CREAT SERPL-MCNC: 2.98 MG/DL — HIGH (ref 0.5–1.3)
CREAT SERPL-MCNC: 5.15 MG/DL — HIGH (ref 0.5–1.3)
DIFF PNL FLD: ABNORMAL
EOSINOPHIL # BLD AUTO: 0 K/UL — SIGNIFICANT CHANGE UP (ref 0–0.5)
EOSINOPHIL NFR BLD AUTO: 0 % — SIGNIFICANT CHANGE UP (ref 0–6)
EPI CELLS # UR: ABNORMAL /HPF
GLUCOSE SERPL-MCNC: 123 MG/DL — HIGH (ref 70–99)
GLUCOSE SERPL-MCNC: 584 MG/DL — CRITICAL HIGH (ref 70–99)
GLUCOSE UR QL: NEGATIVE — SIGNIFICANT CHANGE UP
HCO3 BLDA-SCNC: 14 MMOL/L — LOW (ref 23–27)
HCT VFR BLD CALC: 34.6 % — SIGNIFICANT CHANGE UP (ref 34.5–45)
HCT VFR BLD CALC: 49 % — HIGH (ref 34.5–45)
HGB BLD-MCNC: 10.5 G/DL — LOW (ref 11.5–15.5)
HGB BLD-MCNC: 14.5 G/DL — SIGNIFICANT CHANGE UP (ref 11.5–15.5)
HOROWITZ INDEX BLDA+IHG-RTO: 21 — SIGNIFICANT CHANGE UP
HYPOCHROMIA BLD QL: SLIGHT — SIGNIFICANT CHANGE UP
INR BLD: 1.64 RATIO — HIGH (ref 0.88–1.16)
KETONES UR-MCNC: NEGATIVE — SIGNIFICANT CHANGE UP
LACTATE SERPL-SCNC: 3.7 MMOL/L — HIGH (ref 0.7–2)
LACTATE SERPL-SCNC: 7.3 MMOL/L — CRITICAL HIGH (ref 0.7–2)
LEUKOCYTE ESTERASE UR-ACNC: ABNORMAL
LYMPHOCYTES # BLD AUTO: 1.2 K/UL — SIGNIFICANT CHANGE UP (ref 1–3.3)
LYMPHOCYTES # BLD AUTO: 5.8 % — LOW (ref 13–44)
LYMPHOCYTES # BLD AUTO: 9 % — LOW (ref 13–44)
MAGNESIUM SERPL-MCNC: 4.5 MG/DL — HIGH (ref 1.6–2.6)
MANUAL DIF COMMENT BLD-IMP: SIGNIFICANT CHANGE UP
MCHC RBC-ENTMCNC: 25 PG — LOW (ref 27–34)
MCHC RBC-ENTMCNC: 25.2 PG — LOW (ref 27–34)
MCHC RBC-ENTMCNC: 29.6 GM/DL — LOW (ref 32–36)
MCHC RBC-ENTMCNC: 30.3 GM/DL — LOW (ref 32–36)
MCV RBC AUTO: 83.1 FL — SIGNIFICANT CHANGE UP (ref 80–100)
MCV RBC AUTO: 84.4 FL — SIGNIFICANT CHANGE UP (ref 80–100)
MICROCYTES BLD QL: SLIGHT — SIGNIFICANT CHANGE UP
MONOCYTES # BLD AUTO: 0.9 K/UL — SIGNIFICANT CHANGE UP (ref 0–0.9)
MONOCYTES NFR BLD AUTO: 4.2 % — SIGNIFICANT CHANGE UP (ref 2–14)
MONOCYTES NFR BLD AUTO: 6 % — SIGNIFICANT CHANGE UP (ref 2–14)
NEUTROPHILS # BLD AUTO: 18.8 K/UL — HIGH (ref 1.8–7.4)
NEUTROPHILS NFR BLD AUTO: 84 % — HIGH (ref 43–77)
NEUTROPHILS NFR BLD AUTO: 89.8 % — HIGH (ref 43–77)
NEUTS BAND # BLD: 1 % — SIGNIFICANT CHANGE UP (ref 0–8)
NITRITE UR-MCNC: POSITIVE
PCO2 BLDA: 32 MMHG — SIGNIFICANT CHANGE UP (ref 32–46)
PH BLDA: 7.28 — LOW (ref 7.35–7.45)
PH UR: 7 — SIGNIFICANT CHANGE UP (ref 5–8)
PLAT MORPH BLD: NORMAL — SIGNIFICANT CHANGE UP
PLATELET # BLD AUTO: 306 K/UL — SIGNIFICANT CHANGE UP (ref 150–400)
PLATELET # BLD AUTO: 514 K/UL — HIGH (ref 150–400)
PO2 BLDA: 49 MMHG — CRITICAL LOW (ref 74–108)
POIKILOCYTOSIS BLD QL AUTO: SLIGHT — SIGNIFICANT CHANGE UP
POTASSIUM SERPL-MCNC: 3.3 MMOL/L — LOW (ref 3.5–5.3)
POTASSIUM SERPL-MCNC: 5.6 MMOL/L — HIGH (ref 3.5–5.3)
POTASSIUM SERPL-SCNC: 3.3 MMOL/L — LOW (ref 3.5–5.3)
POTASSIUM SERPL-SCNC: 5.6 MMOL/L — HIGH (ref 3.5–5.3)
PROT SERPL-MCNC: 6.5 G/DL — SIGNIFICANT CHANGE UP (ref 6–8.3)
PROT SERPL-MCNC: 9.4 G/DL — HIGH (ref 6–8.3)
PROT UR-MCNC: 100
PROTHROM AB SERPL-ACNC: 18.1 SEC — HIGH (ref 9.8–12.7)
RAPID RVP RESULT: SIGNIFICANT CHANGE UP
RBC # BLD: 4.16 M/UL — SIGNIFICANT CHANGE UP (ref 3.8–5.2)
RBC # BLD: 5.81 M/UL — HIGH (ref 3.8–5.2)
RBC # FLD: 13.5 % — SIGNIFICANT CHANGE UP (ref 10.3–14.5)
RBC # FLD: 13.6 % — SIGNIFICANT CHANGE UP (ref 10.3–14.5)
RBC BLD AUTO: ABNORMAL
RBC CASTS # UR COMP ASSIST: ABNORMAL /HPF (ref 0–2)
SAO2 % BLDA: 78 % — LOW (ref 92–96)
SODIUM SERPL-SCNC: 156 MMOL/L — HIGH (ref 135–145)
SODIUM SERPL-SCNC: 162 MMOL/L — CRITICAL HIGH (ref 135–145)
SP GR SPEC: 1.01 — SIGNIFICANT CHANGE UP (ref 1.01–1.02)
UROBILINOGEN FLD QL: 1
WBC # BLD: 20.9 K/UL — HIGH (ref 3.8–10.5)
WBC # BLD: 27.4 K/UL — HIGH (ref 3.8–10.5)
WBC # FLD AUTO: 20.9 K/UL — HIGH (ref 3.8–10.5)
WBC # FLD AUTO: 27.4 K/UL — HIGH (ref 3.8–10.5)
WBC UR QL: >50 /HPF (ref 0–5)

## 2018-03-11 PROCEDURE — 93010 ELECTROCARDIOGRAM REPORT: CPT

## 2018-03-11 PROCEDURE — 74176 CT ABD & PELVIS W/O CONTRAST: CPT | Mod: 26

## 2018-03-11 PROCEDURE — 99291 CRITICAL CARE FIRST HOUR: CPT

## 2018-03-11 PROCEDURE — 71045 X-RAY EXAM CHEST 1 VIEW: CPT | Mod: 26

## 2018-03-11 RX ORDER — SODIUM CHLORIDE 9 MG/ML
1300 INJECTION INTRAMUSCULAR; INTRAVENOUS; SUBCUTANEOUS ONCE
Qty: 0 | Refills: 0 | Status: COMPLETED | OUTPATIENT
Start: 2018-03-11 | End: 2018-03-11

## 2018-03-11 RX ORDER — POTASSIUM CHLORIDE 20 MEQ
10 PACKET (EA) ORAL
Qty: 0 | Refills: 0 | Status: COMPLETED | OUTPATIENT
Start: 2018-03-11 | End: 2018-03-11

## 2018-03-11 RX ORDER — SODIUM CHLORIDE 9 MG/ML
1400 INJECTION INTRAMUSCULAR; INTRAVENOUS; SUBCUTANEOUS ONCE
Qty: 0 | Refills: 0 | Status: COMPLETED | OUTPATIENT
Start: 2018-03-11 | End: 2018-03-11

## 2018-03-11 RX ORDER — VANCOMYCIN HCL 1 G
1000 VIAL (EA) INTRAVENOUS ONCE
Qty: 0 | Refills: 0 | Status: COMPLETED | OUTPATIENT
Start: 2018-03-11 | End: 2018-03-11

## 2018-03-11 RX ORDER — INSULIN HUMAN 100 [IU]/ML
8 INJECTION, SOLUTION SUBCUTANEOUS ONCE
Qty: 0 | Refills: 0 | Status: COMPLETED | OUTPATIENT
Start: 2018-03-11 | End: 2018-03-11

## 2018-03-11 RX ORDER — MEROPENEM 1 G/30ML
500 INJECTION INTRAVENOUS EVERY 12 HOURS
Qty: 0 | Refills: 0 | Status: DISCONTINUED | OUTPATIENT
Start: 2018-03-11 | End: 2018-03-20

## 2018-03-11 RX ORDER — SODIUM CHLORIDE 9 MG/ML
1000 INJECTION, SOLUTION INTRAVENOUS
Qty: 0 | Refills: 0 | Status: DISCONTINUED | OUTPATIENT
Start: 2018-03-11 | End: 2018-03-11

## 2018-03-11 RX ORDER — MEROPENEM 1 G/30ML
500 INJECTION INTRAVENOUS EVERY 12 HOURS
Qty: 0 | Refills: 0 | Status: DISCONTINUED | OUTPATIENT
Start: 2018-03-11 | End: 2018-03-11

## 2018-03-11 RX ORDER — CALCIUM GLUCONATE 100 MG/ML
1 VIAL (ML) INTRAVENOUS ONCE
Qty: 0 | Refills: 0 | Status: COMPLETED | OUTPATIENT
Start: 2018-03-11 | End: 2018-03-11

## 2018-03-11 RX ORDER — SODIUM CHLORIDE 9 MG/ML
1000 INJECTION, SOLUTION INTRAVENOUS
Qty: 0 | Refills: 0 | Status: DISCONTINUED | OUTPATIENT
Start: 2018-03-11 | End: 2018-03-12

## 2018-03-11 RX ORDER — PANTOPRAZOLE SODIUM 20 MG/1
40 TABLET, DELAYED RELEASE ORAL DAILY
Qty: 0 | Refills: 0 | Status: DISCONTINUED | OUTPATIENT
Start: 2018-03-11 | End: 2018-03-20

## 2018-03-11 RX ORDER — INSULIN HUMAN 100 [IU]/ML
4 INJECTION, SOLUTION SUBCUTANEOUS
Qty: 100 | Refills: 0 | Status: DISCONTINUED | OUTPATIENT
Start: 2018-03-11 | End: 2018-03-12

## 2018-03-11 RX ORDER — MEROPENEM 1 G/30ML
500 INJECTION INTRAVENOUS EVERY 24 HOURS
Qty: 0 | Refills: 0 | Status: DISCONTINUED | OUTPATIENT
Start: 2018-03-11 | End: 2018-03-11

## 2018-03-11 RX ORDER — HEPARIN SODIUM 5000 [USP'U]/ML
5000 INJECTION INTRAVENOUS; SUBCUTANEOUS EVERY 12 HOURS
Qty: 0 | Refills: 0 | Status: DISCONTINUED | OUTPATIENT
Start: 2018-03-11 | End: 2018-03-20

## 2018-03-11 RX ADMIN — MEROPENEM 100 MILLIGRAM(S): 1 INJECTION INTRAVENOUS at 18:22

## 2018-03-11 RX ADMIN — SODIUM CHLORIDE 125 MILLILITER(S): 9 INJECTION, SOLUTION INTRAVENOUS at 20:00

## 2018-03-11 RX ADMIN — INSULIN HUMAN 8 UNIT(S): 100 INJECTION, SOLUTION SUBCUTANEOUS at 13:07

## 2018-03-11 RX ADMIN — Medication 100 MILLIEQUIVALENT(S): at 20:51

## 2018-03-11 RX ADMIN — PANTOPRAZOLE SODIUM 40 MILLIGRAM(S): 20 TABLET, DELAYED RELEASE ORAL at 16:31

## 2018-03-11 RX ADMIN — HEPARIN SODIUM 5000 UNIT(S): 5000 INJECTION INTRAVENOUS; SUBCUTANEOUS at 16:32

## 2018-03-11 RX ADMIN — Medication 250 MILLIGRAM(S): at 13:08

## 2018-03-11 RX ADMIN — Medication 200 GRAM(S): at 17:06

## 2018-03-11 RX ADMIN — SODIUM CHLORIDE 100 MILLILITER(S): 9 INJECTION, SOLUTION INTRAVENOUS at 16:15

## 2018-03-11 RX ADMIN — SODIUM CHLORIDE 2600 MILLILITER(S): 9 INJECTION INTRAMUSCULAR; INTRAVENOUS; SUBCUTANEOUS at 13:32

## 2018-03-11 RX ADMIN — INSULIN HUMAN 4 UNIT(S)/HR: 100 INJECTION, SOLUTION SUBCUTANEOUS at 13:57

## 2018-03-11 RX ADMIN — Medication 100 MILLIEQUIVALENT(S): at 22:12

## 2018-03-11 RX ADMIN — SODIUM CHLORIDE 2800 MILLILITER(S): 9 INJECTION INTRAMUSCULAR; INTRAVENOUS; SUBCUTANEOUS at 11:42

## 2018-03-11 RX ADMIN — Medication 100 MILLIEQUIVALENT(S): at 19:26

## 2018-03-11 NOTE — ED PROVIDER NOTE - CARE PLAN
Principal Discharge DX:	DKA (diabetic ketoacidoses)  Secondary Diagnosis:	Sepsis  Secondary Diagnosis:	Dehydration

## 2018-03-11 NOTE — CONSULT NOTE ADULT - SUBJECTIVE AND OBJECTIVE BOX
Patient is a 83y old  Female who presents with a chief complaint of acute renal failure with hyperkalemia, hyperosmolar hyperglycemic state secondary to diabetes and sepsis (11 Mar 2018 14:55)      INTERVAL HPI/OVERNIGHT EVENTS:  T(C): --  HR: 64 (18 @ 20:00) (64 - 108)  BP: 93/56 (18 @ 20:00) (84/55 - 129/102)  RR: 13 (18 @ 20:00) (11 - 30)  SpO2: 61% (18 @ 19:45) (61% - 100%)  Wt(kg): --  I&O's Summary    11 Mar 2018 07:01  -  11 Mar 2018 20:43  --------------------------------------------------------  IN: 522 mL / OUT: 375 mL / NET: 147 mL        PAST MEDICAL & SURGICAL HISTORY:  PAD (peripheral artery disease)  Overactive bladder  GERD (gastroesophageal reflux disease)  Dyslipidemia  HTN (hypertension)  Pancreatitis  Dementia  Diabetes mellitus, type 2  Lung nodule: Right Lung Surgery  - 2014  History of tonsillectomy  PAD (peripheral artery disease): s/p left lower extremity stent  History of knee replacement, total, bilateral      SOCIAL HISTORY  Alcohol:  Tobacco:  Illicit substance use:      FAMILY HISTORY:      LABS:                        10.5   20.9  )-----------( 306      ( 11 Mar 2018 18:21 )             34.6         162<HH>  |  135<H>  |  118<H>  ----------------------------<  123<H>  3.3<L>   |  16<L>  |  2.98<H>    Ca    8.1<L>      11 Mar 2018 18:21  Mg     4.5         TPro  6.5  /  Alb  1.6<L>  /  TBili  0.2  /  DBili  x   /  AST  28  /  ALT  29  /  AlkPhos  69      PT/INR - ( 11 Mar 2018 18:21 )   PT: 18.1 sec;   INR: 1.64 ratio           Urinalysis Basic - ( 11 Mar 2018 13:40 )    Color: Yellow / Appearance: Turbid / S.010 / pH: x  Gluc: x / Ketone: Negative  / Bili: Small / Urobili: 1   Blood: x / Protein: 100 / Nitrite: Positive   Leuk Esterase: Moderate / RBC: 5-10 /HPF / WBC >50 /HPF   Sq Epi: x / Non Sq Epi: Occasional /HPF / Bacteria: TNTC /HPF      CAPILLARY BLOOD GLUCOSE      POCT Blood Glucose.: 100 mg/dL (11 Mar 2018 19:52)  POCT Blood Glucose.: 125 mg/dL (11 Mar 2018 18:56)  POCT Blood Glucose.: 167 mg/dL (11 Mar 2018 18:02)  POCT Blood Glucose.: 222 mg/dL (11 Mar 2018 17:14)  POCT Blood Glucose.: 200 mg/dL (11 Mar 2018 17:12)  POCT Blood Glucose.: 422 mg/dL (11 Mar 2018 15:02)  POCT Blood Glucose.: 467 mg/dL (11 Mar 2018 13:41)  POCT Blood Glucose.: 458 mg/dL (11 Mar 2018 11:42)    ABG - ( 11 Mar 2018 12:52 )  pH: 7.28  /  pCO2: 32    /  pO2: 49    / HCO3: 14    / Base Excess: -11.2 /  SaO2: 78                  Urinalysis Basic - ( 11 Mar 2018 13:40 )    Color: Yellow / Appearance: Turbid / S.010 / pH: x  Gluc: x / Ketone: Negative  / Bili: Small / Urobili: 1   Blood: x / Protein: 100 / Nitrite: Positive   Leuk Esterase: Moderate / RBC: 5-10 /HPF / WBC >50 /HPF   Sq Epi: x / Non Sq Epi: Occasional /HPF / Bacteria: TNTC /HPF        MEDICATIONS  (STANDING):  dextrose 5% + sodium chloride 0.9% 1000 milliLiter(s) (125 mL/Hr) IV Continuous <Continuous>  heparin  Injectable 5000 Unit(s) SubCutaneous every 12 hours  insulin Infusion 4 Unit(s)/Hr (4 mL/Hr) IV Continuous <Continuous>  levoFLOXacin IVPB 250 milliGRAM(s) IV Intermittent every 48 hours  meropenem  IVPB 500 milliGRAM(s) IV Intermittent every 12 hours  pantoprazole  Injectable 40 milliGRAM(s) IV Push daily  potassium chloride  10 mEq/100 mL IVPB 10 milliEquivalent(s) IV Intermittent every 1 hour    MEDICATIONS  (PRN):      REVIEW OF SYSTEMS:  CONSTITUTIONAL: No fever, weight loss, or fatigue  EYES: No eye pain, visual disturbances, or discharge  ENMT:  No difficulty hearing, tinnitus, vertigo; No sinus or throat pain  NECK: No pain or stiffness  RESPIRATORY: No cough, wheezing, chills or hemoptysis; No shortness of breath  CARDIOVASCULAR: No chest pain, palpitations, dizziness, or leg swelling  GASTROINTESTINAL: No abdominal or epigastric pain. No nausea, vomiting, or hematemesis; No diarrhea or constipation. No melena or hematochezia.  GENITOURINARY: No dysuria, frequency, hematuria, or incontinence  NEUROLOGICAL: No headaches, memory loss, loss of strength, numbness, or tremors  SKIN: No itching, burning, rashes, or lesions   LYMPH NODES: No enlarged glands  ENDOCRINE: No heat or cold intolerance; No hair loss  MUSCULOSKELETAL: No joint pain or swelling; No muscle, back, or extremity pain  PSYCHIATRIC: No depression, anxiety, mood swings, or difficulty sleeping  HEME/LYMPH: No easy bruising, or bleeding gums  ALLERY AND IMMUNOLOGIC: No hives or eczema    RADIOLOGY & ADDITIONAL TESTS:    Imaging Personally Reviewed:  [ ] YES  [ ] NO    Consultant(s) Notes Reviewed:  [ ] YES  [ ] NO    PHYSICAL EXAM:  GENERAL: NAD, well-groomed, well-developed  HEAD:  Atraumatic, Normocephalic  EYES: EOMI, PERRLA, conjunctiva and sclera clear  ENMT: No tonsillar erythema, exudates, or enlargement; Moist mucous membranes, Good dentition, No lesions  NECK: Supple, No JVD, Normal thyroid  NERVOUS SYSTEM:  Alert & Oriented X3, Good concentration; Motor Strength 5/5 B/L upper and lower extremities; DTRs 2+ intact and symmetric  CHEST/LUNG: Clear to percussion bilaterally; No rales, rhonchi, wheezing, or rubs  HEART: Regular rate and rhythm; No murmurs, rubs, or gallops  ABDOMEN: Soft, Nontender, Nondistended; Bowel sounds present  EXTREMITIES:  2+ Peripheral Pulses, No clubbing, cyanosis, or edema  LYMPH: No lymphadenopathy noted  SKIN: No rashes or lesions    Care Discussed with Consultants/Other Providers [ ] YES  [ ] NO Patient is a 83y old  Female with multiple co-morbidities including  Dementia, bedbound  and DM2, has sent in to the ER from Nursing Home for evaluation of acute renal failure with hyperkalemia, hyperosmolar hyperglycemic state secondary to diabetes and sepsis (11 Mar 2018 14:55). As per daughters at bedside she has been getting progressively less interactive in the past month and has been eating very little.  In the ER, she found to be afebrile but tachycardic and hypotensive to 90/56. The labs significant for positive Urine analysis, Leukocytosis, lactate of 7.3 and  Blood glucose of 584, elevated creatinine  of 5.15. 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 She has admitted to ICU since requiring pressor and started on broad spectrum antibiotics, cultures pending. The ID consult requested to assist with further evaluation and antibiotic  management.            REVIEW OF SYSTEMS: Unable to obtain since patient is nonverbal unless mentioned in HPI        PAST MEDICAL & SURGICAL HISTORY:  PAD (peripheral artery disease)  Overactive bladder  GERD (gastroesophageal reflux disease)  Dyslipidemia  HTN (hypertension)  Pancreatitis  Dementia  Diabetes mellitus, type 2  Lung nodule: Right Lung Surgery  - 2014  History of tonsillectomy  PAD (peripheral artery disease): s/p left lower extremity stent  History of knee replacement, total, bilateral            SOCIAL HISTORY  Alcohol: Does not drink  Tobacco: Does not smoke  Illicit substance use: None            FAMILY HISTORY: Non contributory to the present illness          ALLERGIES: NKDA        T(C): --  HR: 64 (18 @ 20:00) (64 - 108)  BP: 93/56 (18 @ 20:00) (84/55 - 129/102)  RR: 13 (18 @ 20:00) (11 - 30)  SpO2: 61% (18 @ 19:45) (61% - 100%)  Wt(kg): --  I&O's Summary            PHYSICAL EXAM:  GENERAL: Not in acute distress, On VM  CVS: s1 and s2 present  RESP: Air entry B/L  GI: Abdomen nondistended and nontender  BACK: Stage IV  decubital ulcer with malodorous drainage  EXT: No pedal edema   CNS: Non verbal/ Letahrgy          LABS:                        10.5   20.9  )-----------( 306      ( 11 Mar 2018 18:21 )             34.6         03-11    162<HH>  |  135<H>  |  118<H>  ----------------------------<  123<H>  3.3<L>   |  16<L>  |  2.98<H>    Ca    8.1<L>      11 Mar 2018 18:21  Mg     4.5         TPro  6.5  /  Alb  1.6<L>  /  TBili  0.2  /  DBili  x   /  AST  28  /  ALT  29  /  AlkPhos  69  03-11    PT/INR - ( 11 Mar 2018 18:21 )   PT: 18.1 sec;   INR: 1.64 ratio           Urinalysis Basic - ( 11 Mar 2018 13:40 )    Color: Yellow / Appearance: Turbid / S.010 / pH: x  Gluc: x / Ketone: Negative  / Bili: Small / Urobili: 1   Blood: x / Protein: 100 / Nitrite: Positive   Leuk Esterase: Moderate / RBC: 5-10 /HPF / WBC >50 /HPF   Sq Epi: x / Non Sq Epi: Occasional /HPF / Bacteria: TNTC /HPF      CAPILLARY BLOOD GLUCOSE      POCT Blood Glucose.: 100 mg/dL (11 Mar 2018 19:52)  POCT Blood Glucose.: 125 mg/dL (11 Mar 2018 18:56)  POCT Blood Glucose.: 167 mg/dL (11 Mar 2018 18:02)  POCT Blood Glucose.: 222 mg/dL (11 Mar 2018 17:14)  POCT Blood Glucose.: 200 mg/dL (11 Mar 2018 17:12)  POCT Blood Glucose.: 422 mg/dL (11 Mar 2018 15:02)  POCT Blood Glucose.: 467 mg/dL (11 Mar 2018 13:41)  POCT Blood Glucose.: 458 mg/dL (11 Mar 2018 11:42)    ABG - ( 11 Mar 2018 12:52 )  pH: 7.28  /  pCO2: 32    /  pO2: 49    / HCO3: 14    / Base Excess: -11.2 /  SaO2: 78              MEDICATIONS  (STANDING):  dextrose 5% + sodium chloride 0.9% 1000 milliLiter(s) (125 mL/Hr) IV Continuous <Continuous>  heparin  Injectable 5000 Unit(s) SubCutaneous every 12 hours  insulin Infusion 4 Unit(s)/Hr (4 mL/Hr) IV Continuous <Continuous>  levoFLOXacin IVPB 250 milliGRAM(s) IV Intermittent every 48 hours  meropenem  IVPB 500 milliGRAM(s) IV Intermittent every 12 hours  pantoprazole  Injectable 40 milliGRAM(s) IV Push daily  potassium chloride  10 mEq/100 mL IVPB 10 milliEquivalent(s) IV Intermittent every 1 hour    MEDICATIONS  (PRN):          RADIOLOGY & ADDITIONAL TESTS:    3/11/18 CT Abdomen and Pelvis No Cont (18 @ 15:51) Sacral decubitus ulcer with suspected osteomyelitis and possible intramuscular left gluteal abscesses.

## 2018-03-11 NOTE — H&P ADULT - HISTORY OF PRESENT ILLNESS
83 f from NH was sent in for hyperglycemia and failure to thrive . As per daughters at bedside she has been getting progressively less interactive in the past month and has been eating very little. Patient is non verbal and arousable . She is not following any commands. Unable to obtain any further information at this time. In the ed patient is afebrile but tachycardic and hypotensive to 90/56. Labs are significant for elevated white count of 27 with left shift, lactate of 7.3 . Blood sugars on admission are 584 with low serum bicarbonate and increased anion gap . Creatinine is 5.15 and pottasium is 5.5 . She has been making very little urine after pompa was placed in the ED

## 2018-03-11 NOTE — CONSULT NOTE ADULT - CONSULT REASON
Septic shock/infected decubitus ulcer/UTI Septic shock/UTI/ infected decubitus ulcer with OM and Left gluteal abscess

## 2018-03-11 NOTE — H&P ADULT - REASON FOR ADMISSION
acute renal failure with hyperkalemia, hyperosmolar hyperglycemic state secondary to diabetes and sepsis

## 2018-03-11 NOTE — H&P ADULT - PROBLEM SELECTOR PLAN 8
Palliative consult for possible hospice   •Discussed goals of care includes prognosis & cpr in detail.   •Family not ready to make any desicion at this time . Remains full code

## 2018-03-11 NOTE — H&P ADULT - PROBLEM SELECTOR PLAN 6
Dvt & gi prophylaxis- improve score 2 for age and immobilization. Continue heparin sc and protonix Patient looks cachectic with temporal wasting , decreased po intake, decreased appetite, scaphoid abdomen and unstageable sacral ulcers  •Palliative consult for possible hospice

## 2018-03-11 NOTE — H&P ADULT - ATTENDING COMMENTS
83 female nursing home patient, bedbound, cachectic, with numerous comorbidities, presents with fever, severe sepsis, encephalopathy, MALIKA, secondary to UTI and possibly also related to infected sacral decub ulcer.    Plan:  - admit to ICU  - fluid resuscitation, start pressor if map < 65  - broad spectrum abx  - CT abdomen/ pelvis  - palliative care consult  very poor overall prognosis

## 2018-03-11 NOTE — ED PROVIDER NOTE - PROGRESS NOTE DETAILS
Pt's daughter came, claims pt normally able to talk, last seen Fri. pt was lethargic, not eating for past 3 days.  Pt walks with assistance. Pt's daughter came, claims pt normally able to talk, last seen Fri. pt was lethargic, not eating for past 3 days.  Pt walks with assistance.  Pt is full code case d/w ICU, pt with hypoxemia, sepsis, dehydration case d/w Dr. Delgado case d/w Dr. Delgado, pt to be admitted to ICU

## 2018-03-11 NOTE — ED ADULT NURSE NOTE - ED STAT RN HANDOFF DETAILS
Patient admitted to ICU on heparin drip report given to RN, patient to go for Ct-scan prior to transfer ,stable in no acute distress, pompa with adequate output, venti mask at 50% . patient to be transpirt via stretcher by RN and MD.

## 2018-03-11 NOTE — H&P ADULT - PROBLEM SELECTOR PLAN 5
Patient looks cachectic with temporal wasting , decreased po intake, decreased appetite, scaphoid abdomen and unstageable sacral ulcers  •Palliative consult for possible hospice Secondary to acute renal failure   •Will not attempt to correct as patient on insulin drip  •Will repeat bmp  •Will give calcium gluconate   •Ekg does not show any changes attributable to hyperkalemia

## 2018-03-11 NOTE — H&P ADULT - PROBLEM SELECTOR PLAN 7
Palliative consult for possible hospice   •Discussed goals of care includes prognosis & cpr in detail.   •Family not ready to make any desicion at this time . Remains full code Dvt & gi prophylaxis- improve score 2 for age and immobilization. Continue heparin sc and protonix

## 2018-03-11 NOTE — ED PROVIDER NOTE - CRITICAL CARE PROVIDED
additional history taking/interpretation of diagnostic studies/consultation with other physicians/direct patient care (not related to procedure)/conducted a detailed discussion of DNR status/documentation/consult w/ pt's family directly relating to pts condition

## 2018-03-11 NOTE — PROGRESS NOTE ADULT - SUBJECTIVE AND OBJECTIVE BOX
Medical attending covering Dr Coronado.      Patient is a 83y old  Female who presents with a chief complaint of acute renal failure with hyperkalemia, hyperosmolar hyperglycemic state secondary to diabetes and sepsis (11 Mar 2018 14:55)  seen in ER, d/w ER MD    INTERVAL HPI/OVERNIGHT EVENTS:  T(C): --  HR: 76 (18 @ 15:00) (76 - 108)  BP: 124/97 (18 @ 15:00) (90/56 - 124/97)  RR: 17 (18 @ 15:00) (17 - 20)  SpO2: 99% (18 @ 15:00) (96% - 99%)  Wt(kg): --  I&O's Summary      LABS:                        14.5   27.4  )-----------( 514      ( 11 Mar 2018 12:18 )             49.0     03-    156<H>  |  121<H>  |  153<H>  ----------------------------<  584<HH>  5.6<H>   |  18<L>  |  5.15<H>    Ca    9.8      11 Mar 2018 12:18  Mg     4.5         TPro  9.4<H>  /  Alb  2.4<L>  /  TBili  0.3  /  DBili  x   /  AST  28  /  ALT  39  /  AlkPhos  100  03-11      Urinalysis Basic - ( 11 Mar 2018 13:40 )    Color: Yellow / Appearance: Turbid / S.010 / pH: x  Gluc: x / Ketone: Negative  / Bili: Small / Urobili: 1   Blood: x / Protein: 100 / Nitrite: Positive   Leuk Esterase: Moderate / RBC: 5-10 /HPF / WBC >50 /HPF   Sq Epi: x / Non Sq Epi: Occasional /HPF / Bacteria: TNTC /HPF      CAPILLARY BLOOD GLUCOSE      POCT Blood Glucose.: 422 mg/dL (11 Mar 2018 15:02)  POCT Blood Glucose.: 467 mg/dL (11 Mar 2018 13:41)  POCT Blood Glucose.: 458 mg/dL (11 Mar 2018 11:42)      ABG - ( 11 Mar 2018 12:52 )  pH: 7.28  /  pCO2: 32    /  pO2: 49    / HCO3: 14    / Base Excess: -11.2 /  SaO2: 78                Urinalysis Basic - ( 11 Mar 2018 13:40 )    Color: Yellow / Appearance: Turbid / S.010 / pH: x  Gluc: x / Ketone: Negative  / Bili: Small / Urobili: 1   Blood: x / Protein: 100 / Nitrite: Positive   Leuk Esterase: Moderate / RBC: 5-10 /HPF / WBC >50 /HPF   Sq Epi: x / Non Sq Epi: Occasional /HPF / Bacteria: TNTC /HPF      REVIEW OF SYSTEMS:  not available from patient.    RADIOLOGY & ADDITIONAL TESTS:    Imaging Personally Reviewed:  [ ] YES  [ ] NO    Consultant(s) Notes Reviewed:  [ ] YES  [ ] NO    PHYSICAL EXAM:  GENERAL:  thin, cachectic.  HEAD:  Atraumatic, Normocephalic  EYES: EOMI, PERRLA, conjunctiva and sclera clear  ENMT: No tonsillar erythema, exudates, or enlargement; Moist mucous membranes, Good dentition, No lesions  NECK: Supple, No JVD, Normal thyroid  NERVOUS SYSTEM:  Alert & Oriented X1, Motor Strength 5/5 B/L upper and lower extremities; DTRs 2+ intact and symmetric  CHEST/LUNG: Clear to percussion bilaterally; No rales, rhonchi, wheezing, or rubs  HEART: Regular rate and rhythm; No murmurs, rubs, or gallops  ABDOMEN: Soft, Nontender, Nondistended; Bowel sounds present  EXTREMITIES:  2+ Peripheral Pulses, No clubbing, cyanosis, or edema  LYMPH: No lymphadenopathy noted  SKIN: stage 4 decubitus.    Care Discussed with Consultants/Other Providers [ ] YES  [ ] NO    calcium gluconate IVPB 1 Gram(s) IV Intermittent Once  heparin  Injectable 5000 Unit(s) SubCutaneous every 12 hours  insulin Infusion 4 Unit(s)/Hr IV Continuous <Continuous>  lactated ringers. 1000 milliLiter(s) IV Continuous <Continuous>  meropenem  IVPB 500 milliGRAM(s) IV Intermittent every 24 hours  pantoprazole  Injectable 40 milliGRAM(s) IV Push daily      A/P  severe sepsis secondary to UTI, Hyperosmolar/hyperglycemic state, MALIKA/pre renal, hypernatremia/hyperkalemia, Dementia    Plan: as per ICU  detail d/w RN at [facility] and later with JOSE CRUZ GUTIERREZ.

## 2018-03-11 NOTE — H&P ADULT - PROBLEM SELECTOR PLAN 4
Secondary to acute renal failure   •Will not attempt to correct as patient on insulin drip  •Will repeat bmp  •Will give calcium gluconate   •Ekg does not show any changes attributable to hyperkalemia - Corrected sodium of 164 on admission , Hypovolemic hypernatremia  - Free water deficit is 4L not accounting for insensible losses  - S/p 4 l NS. Will repeat bmp

## 2018-03-11 NOTE — H&P ADULT - PROBLEM SELECTOR PLAN 3
cr on admission is 5.15 /153  • Likely pre-renal secondary to dehydration from hyperglycemia , UTI and sepsis   •Very low urine output from pompa placed in ed  •Normal baseline last year   •F/U urine electrolytes & bmp

## 2018-03-11 NOTE — H&P ADULT - PROBLEM SELECTOR PLAN 1
Secondary to UTI and infected sacral ulcer   •Bp still borderline low after she received 4 L iv fluids   •Continue maintenance fluids   •Will start on zosyn to cover for uti and sacral wound   •Needs surgery consult for possible debridement   •F/u repeat lactate and labs   •Will get ct abdomen to evaluate for intra-abdominal pathology   •follow blood & urine cultures Secondary to UTI and infected sacral ulcer   •Bp still borderline low after she received 4 L iv fluids   •Continue maintenance fluids   •Will start on meropenem [renally adjusted, allergic to pencillin ] to cover for uti and sacral wound . Dr Garcia consulted   •Needs surgery consult for possible debridement   •F/u repeat lactate and labs   •Will get ct abdomen to evaluate for intra-abdominal pathology   •follow blood & urine cultures

## 2018-03-12 DIAGNOSIS — A41.9 SEPSIS, UNSPECIFIED ORGANISM: ICD-10-CM

## 2018-03-12 DIAGNOSIS — G30.1 ALZHEIMER'S DISEASE WITH LATE ONSET: ICD-10-CM

## 2018-03-12 DIAGNOSIS — L89.154 PRESSURE ULCER OF SACRAL REGION, STAGE 4: ICD-10-CM

## 2018-03-12 DIAGNOSIS — R53.2 FUNCTIONAL QUADRIPLEGIA: ICD-10-CM

## 2018-03-12 DIAGNOSIS — M46.28 OSTEOMYELITIS OF VERTEBRA, SACRAL AND SACROCOCCYGEAL REGION: ICD-10-CM

## 2018-03-12 DIAGNOSIS — E43 UNSPECIFIED SEVERE PROTEIN-CALORIE MALNUTRITION: ICD-10-CM

## 2018-03-12 DIAGNOSIS — Z51.5 ENCOUNTER FOR PALLIATIVE CARE: ICD-10-CM

## 2018-03-12 LAB
-  CANDIDA ALBICANS: SIGNIFICANT CHANGE UP
-  CANDIDA GLABRATA: SIGNIFICANT CHANGE UP
-  CANDIDA KRUSEI: SIGNIFICANT CHANGE UP
-  CANDIDA PARAPSILOSIS: SIGNIFICANT CHANGE UP
-  CANDIDA TROPICALIS: SIGNIFICANT CHANGE UP
-  COAGULASE NEGATIVE STAPHYLOCOCCUS: SIGNIFICANT CHANGE UP
-  K. PNEUMONIAE GROUP: SIGNIFICANT CHANGE UP
-  KPC RESISTANCE GENE: SIGNIFICANT CHANGE UP
-  STREPTOCOCCUS SP. (NOT GRP A, B OR S PNEUMONIAE): SIGNIFICANT CHANGE UP
A BAUMANNII DNA SPEC QL NAA+PROBE: SIGNIFICANT CHANGE UP
ACETONE SERPL-MCNC: ABNORMAL
ANION GAP SERPL CALC-SCNC: 11 MMOL/L — SIGNIFICANT CHANGE UP (ref 5–17)
ANION GAP SERPL CALC-SCNC: 11 MMOL/L — SIGNIFICANT CHANGE UP (ref 5–17)
ANION GAP SERPL CALC-SCNC: 13 MMOL/L — SIGNIFICANT CHANGE UP (ref 5–17)
ANION GAP SERPL CALC-SCNC: 9 MMOL/L — SIGNIFICANT CHANGE UP (ref 5–17)
BASE EXCESS BLDV CALC-SCNC: -10.2 MMOL/L — LOW (ref -2–2)
BLOOD GAS COMMENTS, VENOUS: SIGNIFICANT CHANGE UP
BUN SERPL-MCNC: 117 MG/DL — HIGH (ref 7–18)
BUN SERPL-MCNC: 60 MG/DL — HIGH (ref 7–18)
BUN SERPL-MCNC: 68 MG/DL — HIGH (ref 7–18)
BUN SERPL-MCNC: 93 MG/DL — HIGH (ref 7–18)
CALCIUM SERPL-MCNC: 7.6 MG/DL — LOW (ref 8.4–10.5)
CALCIUM SERPL-MCNC: 7.6 MG/DL — LOW (ref 8.4–10.5)
CALCIUM SERPL-MCNC: 7.8 MG/DL — LOW (ref 8.4–10.5)
CALCIUM SERPL-MCNC: 8 MG/DL — LOW (ref 8.4–10.5)
CHLORIDE SERPL-SCNC: 125 MMOL/L — HIGH (ref 96–108)
CHLORIDE SERPL-SCNC: 130 MMOL/L — HIGH (ref 96–108)
CHLORIDE SERPL-SCNC: 133 MMOL/L — HIGH (ref 96–108)
CHLORIDE SERPL-SCNC: 135 MMOL/L — HIGH (ref 96–108)
CO2 SERPL-SCNC: 13 MMOL/L — LOW (ref 22–31)
CO2 SERPL-SCNC: 14 MMOL/L — LOW (ref 22–31)
CO2 SERPL-SCNC: 15 MMOL/L — LOW (ref 22–31)
CO2 SERPL-SCNC: 17 MMOL/L — LOW (ref 22–31)
CREAT SERPL-MCNC: 1.38 MG/DL — HIGH (ref 0.5–1.3)
CREAT SERPL-MCNC: 1.61 MG/DL — HIGH (ref 0.5–1.3)
CREAT SERPL-MCNC: 2.13 MG/DL — HIGH (ref 0.5–1.3)
CREAT SERPL-MCNC: 2.74 MG/DL — HIGH (ref 0.5–1.3)
E CLOAC COMP DNA BLD POS QL NAA+PROBE: SIGNIFICANT CHANGE UP
E COLI DNA BLD POS QL NAA+NON-PROBE: SIGNIFICANT CHANGE UP
ENTEROCOC DNA BLD POS QL NAA+NON-PROBE: SIGNIFICANT CHANGE UP
ENTEROCOC DNA BLD POS QL NAA+NON-PROBE: SIGNIFICANT CHANGE UP
GLUCOSE SERPL-MCNC: 201 MG/DL — HIGH (ref 70–99)
GLUCOSE SERPL-MCNC: 205 MG/DL — HIGH (ref 70–99)
GLUCOSE SERPL-MCNC: 381 MG/DL — HIGH (ref 70–99)
GLUCOSE SERPL-MCNC: 98 MG/DL — SIGNIFICANT CHANGE UP (ref 70–99)
GP B STREP DNA BLD POS QL NAA+NON-PROBE: SIGNIFICANT CHANGE UP
GRAM STN FLD: SIGNIFICANT CHANGE UP
GRAM STN FLD: SIGNIFICANT CHANGE UP
HAEM INFLU DNA BLD POS QL NAA+NON-PROBE: SIGNIFICANT CHANGE UP
HCO3 BLDV-SCNC: 15 MMOL/L — LOW (ref 21–29)
HCT VFR BLD CALC: 34.3 % — LOW (ref 34.5–45)
HGB BLD-MCNC: 10.1 G/DL — LOW (ref 11.5–15.5)
HOROWITZ INDEX BLDV+IHG-RTO: 21 — SIGNIFICANT CHANGE UP
K OXYTOCA DNA BLD POS QL NAA+NON-PROBE: SIGNIFICANT CHANGE UP
L MONOCYTOG DNA BLD POS QL NAA+NON-PROBE: SIGNIFICANT CHANGE UP
LACTATE SERPL-SCNC: 1.7 MMOL/L — SIGNIFICANT CHANGE UP (ref 0.7–2)
LACTATE SERPL-SCNC: 2.6 MMOL/L — HIGH (ref 0.7–2)
MAGNESIUM SERPL-MCNC: 2.4 MG/DL — SIGNIFICANT CHANGE UP (ref 1.6–2.6)
MAGNESIUM SERPL-MCNC: 2.9 MG/DL — HIGH (ref 1.6–2.6)
MCHC RBC-ENTMCNC: 25.3 PG — LOW (ref 27–34)
MCHC RBC-ENTMCNC: 29.4 GM/DL — LOW (ref 32–36)
MCV RBC AUTO: 86 FL — SIGNIFICANT CHANGE UP (ref 80–100)
METHOD TYPE: SIGNIFICANT CHANGE UP
MRSA SPEC QL CULT: SIGNIFICANT CHANGE UP
MSSA DNA SPEC QL NAA+PROBE: SIGNIFICANT CHANGE UP
N MEN ISLT CULT: SIGNIFICANT CHANGE UP
P AERUGINOSA DNA BLD POS NAA+NON-PROBE: SIGNIFICANT CHANGE UP
PCO2 BLDV: 30 MMHG — LOW (ref 35–50)
PH BLDV: 7.31 — LOW (ref 7.35–7.45)
PHOSPHATE SERPL-MCNC: 2 MG/DL — LOW (ref 2.5–4.5)
PLATELET # BLD AUTO: 354 K/UL — SIGNIFICANT CHANGE UP (ref 150–400)
PO2 BLDV: <43 MMHG — SIGNIFICANT CHANGE UP (ref 25–45)
POTASSIUM SERPL-MCNC: 4.5 MMOL/L — SIGNIFICANT CHANGE UP (ref 3.5–5.3)
POTASSIUM SERPL-MCNC: 4.5 MMOL/L — SIGNIFICANT CHANGE UP (ref 3.5–5.3)
POTASSIUM SERPL-MCNC: 4.6 MMOL/L — SIGNIFICANT CHANGE UP (ref 3.5–5.3)
POTASSIUM SERPL-MCNC: 5 MMOL/L — SIGNIFICANT CHANGE UP (ref 3.5–5.3)
POTASSIUM SERPL-SCNC: 4.5 MMOL/L — SIGNIFICANT CHANGE UP (ref 3.5–5.3)
POTASSIUM SERPL-SCNC: 4.5 MMOL/L — SIGNIFICANT CHANGE UP (ref 3.5–5.3)
POTASSIUM SERPL-SCNC: 4.6 MMOL/L — SIGNIFICANT CHANGE UP (ref 3.5–5.3)
POTASSIUM SERPL-SCNC: 5 MMOL/L — SIGNIFICANT CHANGE UP (ref 3.5–5.3)
RBC # BLD: 3.99 M/UL — SIGNIFICANT CHANGE UP (ref 3.8–5.2)
RBC # FLD: 14.1 % — SIGNIFICANT CHANGE UP (ref 10.3–14.5)
S MARCESCENS DNA BLD POS NAA+NON-PROBE: SIGNIFICANT CHANGE UP
S PNEUM DNA BLD POS QL NAA+NON-PROBE: SIGNIFICANT CHANGE UP
S PYO DNA BLD POS QL NAA+NON-PROBE: SIGNIFICANT CHANGE UP
SAO2 % BLDV: 76 % — SIGNIFICANT CHANGE UP (ref 67–88)
SODIUM SERPL-SCNC: 151 MMOL/L — HIGH (ref 135–145)
SODIUM SERPL-SCNC: 156 MMOL/L — HIGH (ref 135–145)
SODIUM SERPL-SCNC: 158 MMOL/L — HIGH (ref 135–145)
SODIUM SERPL-SCNC: 161 MMOL/L — CRITICAL HIGH (ref 135–145)
SPECIMEN SOURCE: SIGNIFICANT CHANGE UP
SPECIMEN SOURCE: SIGNIFICANT CHANGE UP
WBC # BLD: 25.8 K/UL — HIGH (ref 3.8–10.5)
WBC # FLD AUTO: 25.8 K/UL — HIGH (ref 3.8–10.5)

## 2018-03-12 PROCEDURE — 71045 X-RAY EXAM CHEST 1 VIEW: CPT | Mod: 26

## 2018-03-12 PROCEDURE — 99223 1ST HOSP IP/OBS HIGH 75: CPT

## 2018-03-12 RX ORDER — INSULIN HUMAN 100 [IU]/ML
4 INJECTION, SOLUTION SUBCUTANEOUS
Qty: 100 | Refills: 0 | Status: DISCONTINUED | OUTPATIENT
Start: 2018-03-12 | End: 2018-03-12

## 2018-03-12 RX ORDER — PHENYLEPHRINE HYDROCHLORIDE 10 MG/ML
0.5 INJECTION INTRAVENOUS
Qty: 160 | Refills: 0 | Status: DISCONTINUED | OUTPATIENT
Start: 2018-03-12 | End: 2018-03-12

## 2018-03-12 RX ORDER — CHLORHEXIDINE GLUCONATE 213 G/1000ML
1 SOLUTION TOPICAL DAILY
Qty: 0 | Refills: 0 | Status: DISCONTINUED | OUTPATIENT
Start: 2018-03-12 | End: 2018-03-20

## 2018-03-12 RX ORDER — SODIUM CHLORIDE 9 MG/ML
1000 INJECTION INTRAMUSCULAR; INTRAVENOUS; SUBCUTANEOUS ONCE
Qty: 0 | Refills: 0 | Status: COMPLETED | OUTPATIENT
Start: 2018-03-12 | End: 2018-03-12

## 2018-03-12 RX ORDER — INSULIN LISPRO 100/ML
VIAL (ML) SUBCUTANEOUS EVERY 6 HOURS
Qty: 0 | Refills: 0 | Status: DISCONTINUED | OUTPATIENT
Start: 2018-03-12 | End: 2018-03-13

## 2018-03-12 RX ORDER — POTASSIUM PHOSPHATE, MONOBASIC POTASSIUM PHOSPHATE, DIBASIC 236; 224 MG/ML; MG/ML
15 INJECTION, SOLUTION INTRAVENOUS ONCE
Qty: 0 | Refills: 0 | Status: COMPLETED | OUTPATIENT
Start: 2018-03-12 | End: 2018-03-12

## 2018-03-12 RX ORDER — DEXTROSE 50 % IN WATER 50 %
1 SYRINGE (ML) INTRAVENOUS ONCE
Qty: 0 | Refills: 0 | Status: DISCONTINUED | OUTPATIENT
Start: 2018-03-12 | End: 2018-03-20

## 2018-03-12 RX ORDER — SODIUM CHLORIDE 9 MG/ML
1000 INJECTION, SOLUTION INTRAVENOUS
Qty: 0 | Refills: 0 | Status: DISCONTINUED | OUTPATIENT
Start: 2018-03-12 | End: 2018-03-12

## 2018-03-12 RX ORDER — DEXTROSE 50 % IN WATER 50 %
12.5 SYRINGE (ML) INTRAVENOUS ONCE
Qty: 0 | Refills: 0 | Status: DISCONTINUED | OUTPATIENT
Start: 2018-03-12 | End: 2018-03-20

## 2018-03-12 RX ORDER — DEXTROSE 50 % IN WATER 50 %
25 SYRINGE (ML) INTRAVENOUS ONCE
Qty: 0 | Refills: 0 | Status: DISCONTINUED | OUTPATIENT
Start: 2018-03-12 | End: 2018-03-20

## 2018-03-12 RX ORDER — SODIUM CHLORIDE 9 MG/ML
1000 INJECTION, SOLUTION INTRAVENOUS
Qty: 0 | Refills: 0 | Status: DISCONTINUED | OUTPATIENT
Start: 2018-03-12 | End: 2018-03-14

## 2018-03-12 RX ORDER — NOREPINEPHRINE BITARTRATE/D5W 8 MG/250ML
0.5 PLASTIC BAG, INJECTION (ML) INTRAVENOUS
Qty: 16 | Refills: 0 | Status: DISCONTINUED | OUTPATIENT
Start: 2018-03-12 | End: 2018-03-20

## 2018-03-12 RX ORDER — SODIUM CHLORIDE 9 MG/ML
1000 INJECTION, SOLUTION INTRAVENOUS ONCE
Qty: 0 | Refills: 0 | Status: COMPLETED | OUTPATIENT
Start: 2018-03-12 | End: 2018-03-12

## 2018-03-12 RX ORDER — INSULIN GLARGINE 100 [IU]/ML
6 INJECTION, SOLUTION SUBCUTANEOUS AT BEDTIME
Qty: 0 | Refills: 0 | Status: DISCONTINUED | OUTPATIENT
Start: 2018-03-12 | End: 2018-03-18

## 2018-03-12 RX ORDER — GLUCAGON INJECTION, SOLUTION 0.5 MG/.1ML
1 INJECTION, SOLUTION SUBCUTANEOUS ONCE
Qty: 0 | Refills: 0 | Status: DISCONTINUED | OUTPATIENT
Start: 2018-03-12 | End: 2018-03-20

## 2018-03-12 RX ADMIN — SODIUM CHLORIDE 4000 MILLILITER(S): 9 INJECTION, SOLUTION INTRAVENOUS at 11:45

## 2018-03-12 RX ADMIN — MEROPENEM 100 MILLIGRAM(S): 1 INJECTION INTRAVENOUS at 17:06

## 2018-03-12 RX ADMIN — POTASSIUM PHOSPHATE, MONOBASIC POTASSIUM PHOSPHATE, DIBASIC 62.5 MILLIMOLE(S): 236; 224 INJECTION, SOLUTION INTRAVENOUS at 09:44

## 2018-03-12 RX ADMIN — HEPARIN SODIUM 5000 UNIT(S): 5000 INJECTION INTRAVENOUS; SUBCUTANEOUS at 17:06

## 2018-03-12 RX ADMIN — SODIUM CHLORIDE 125 MILLILITER(S): 9 INJECTION, SOLUTION INTRAVENOUS at 08:14

## 2018-03-12 RX ADMIN — SODIUM CHLORIDE 125 MILLILITER(S): 9 INJECTION, SOLUTION INTRAVENOUS at 02:51

## 2018-03-12 RX ADMIN — PHENYLEPHRINE HYDROCHLORIDE 3.83 MICROGRAM(S)/KG/MIN: 10 INJECTION INTRAVENOUS at 02:52

## 2018-03-12 RX ADMIN — Medication 1: at 23:26

## 2018-03-12 RX ADMIN — PANTOPRAZOLE SODIUM 40 MILLIGRAM(S): 20 TABLET, DELAYED RELEASE ORAL at 11:45

## 2018-03-12 RX ADMIN — MEROPENEM 100 MILLIGRAM(S): 1 INJECTION INTRAVENOUS at 05:38

## 2018-03-12 RX ADMIN — SODIUM CHLORIDE 125 MILLILITER(S): 9 INJECTION, SOLUTION INTRAVENOUS at 03:37

## 2018-03-12 RX ADMIN — HEPARIN SODIUM 5000 UNIT(S): 5000 INJECTION INTRAVENOUS; SUBCUTANEOUS at 05:38

## 2018-03-12 RX ADMIN — INSULIN GLARGINE 6 UNIT(S): 100 INJECTION, SOLUTION SUBCUTANEOUS at 23:26

## 2018-03-12 RX ADMIN — Medication 19.12 MICROGRAM(S)/KG/MIN: at 17:11

## 2018-03-12 RX ADMIN — SODIUM CHLORIDE 125 MILLILITER(S): 9 INJECTION, SOLUTION INTRAVENOUS at 11:45

## 2018-03-12 RX ADMIN — SODIUM CHLORIDE 4000 MILLILITER(S): 9 INJECTION INTRAMUSCULAR; INTRAVENOUS; SUBCUTANEOUS at 01:33

## 2018-03-12 RX ADMIN — Medication 4: at 17:08

## 2018-03-12 RX ADMIN — SODIUM CHLORIDE 4000 MILLILITER(S): 9 INJECTION INTRAMUSCULAR; INTRAVENOUS; SUBCUTANEOUS at 02:51

## 2018-03-12 NOTE — PROGRESS NOTE ADULT - ASSESSMENT
83 f from NH was sent in for hyperglycemia and failure to thrive . As per daughters at bedside she has been getting progressively less interactive in the past month and has been eating very little. Patient is non verbal and arousable . She is not following any commands. In the ed patient is afebrile but tachycardic and hypotensive to 90/56. Labs are significant for elevated white count of 27 with left shift, lactate of 7.3 . Blood sugars on admission are 584 with low serum bicarbonate and increased anion gap . Creatinine is 5.15 and pottasium is 5.5 . Abg shows acidosis and hypoxia ; 7.28 ph and po2 of 49 on RA .Icu consulted for acute renal failure with hyperkalemia, hyperosmolar hyperglycemic state secondary to diabetes and sepsis

## 2018-03-12 NOTE — CHART NOTE - NSCHARTNOTEFT_GEN_A_CORE
Upon Nutritional Assessment by the Registered Dietitian your patient was determined to meet criteria / has evidence of the following diagnosis/diagnoses:          [ ]  Mild Protein Calorie Malnutrition        [ ]  Moderate Protein Calorie Malnutrition        [x ] Severe Protein Calorie Malnutrition        [ ] Unspecified Protein Calorie Malnutrition        [x ] Underweight / BMI <19        [ ] Morbid Obesity / BMI > 40      Findings as based on:  •  Comprehensive nutrition assessment and consultation  •  Calorie counts (nutrient intake analysis)  •  Food acceptance and intake status from observations by staff  •  Follow up  •  Patient education  •  Intervention secondary to interdisciplinary rounds  •   concerns      Treatment:    The following diet has been recommended:  TF recommendations in EMR     PROVIDER Section:     By signing this assessment you are acknowledging and agree with the diagnosis/diagnoses assigned by the Registered Dietitian    Comments:

## 2018-03-12 NOTE — PROGRESS NOTE ADULT - PROBLEM SELECTOR PLAN 3
cr on admission is 5.15 /153  • Likely pre-renal secondary to dehydration from hyperglycemia , UTI and sepsis   •Very low urine output from pompa placed in ed  •Normal baseline last year   •F/U urine electrolytes & bmp cr on admission is 5.15 /153  likely form dehydration   improving with hydration

## 2018-03-12 NOTE — CONSULT NOTE ADULT - ATTENDING COMMENTS
Agree w above  Will defer surgical debridement at this time  Collagenase, optimize nutrition, pressure offloading

## 2018-03-12 NOTE — CONSULT NOTE ADULT - PROBLEM SELECTOR RECOMMENDATION 5
Per NH, pt's daughter, Ruba, is patient's surrogate. Spoke with Ruba on the phone - meeting tomorrow at 12 to discuss patient's dementia disease trajectory, goals of care, and advance directives. Ruba will notify her siblings of the meeting. Pt remains a full code at this time

## 2018-03-12 NOTE — DIETITIAN INITIAL EVALUATION ADULT. - PROBLEM SELECTOR PLAN 5
Secondary to acute renal failure   •Will not attempt to correct as patient on insulin drip  •Will repeat bmp  •Will give calcium gluconate   •Ekg does not show any changes attributable to hyperkalemia

## 2018-03-12 NOTE — PROGRESS NOTE ADULT - ASSESSMENT
Patient is a 83y old  Female with multiple co-morbidities including  Dementia, bedbound  and DM2, has sent in to the ER from Nursing Home for evaluation of acute renal failure with hyperkalemia, hyperosmolar hyperglycemic state secondary to diabetes and sepsis (11 Mar 2018 14:55). As per daughters at bedside she has been getting progressively less interactive in the past month and has been eating very little.  In the ER, she found to be afebrile but tachycardic and hypotensive to 90/56. The labs significant for positive Urine analysis, Leukocytosis, lactate of 7.3 and  Blood glucose of 584, elevated creatinine  of 5.15. 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 She has admitted to ICU since requiring pressor and started on broad spectrum antibiotics, cultures pending. The ID consult requested to assist with further evaluation and antibiotic  management.    # Septic shock  # Infected Decubitus ulcer with Left gluteal  Abscess and Osteomyelitis  # UTI    Would recommend:  1. Surgical evaluation of debridement decubitus ulcer and drainage of Left gluteal abscess with deep culture  2. Follow up Urine and blood cultures  3. Vancomycin level in AM and redose based on the level, the Goal level is 15 to 20  4. Monitor WBC count  5. Monitor Kidney function and adjust antibiotic doses accordingly  6. Titrate down pressors as tolerated  7. frequent repositioning    d/w ICU team    will follow the patient with you Patient is a 83y old  Female with multiple co-morbidities including  Dementia, bedbound  and DM2, has sent in to the ER from Nursing Home for evaluation of acute renal failure with hyperkalemia, hyperosmolar hyperglycemic state secondary to diabetes and sepsis (11 Mar 2018 14:55). As per daughters at bedside she has been getting progressively less interactive in the past month and has been eating very little.  In the ER, she found to be afebrile but tachycardic and hypotensive to 90/56. The labs significant for positive Urine analysis, Leukocytosis, lactate of 7.3 and  Blood glucose of 584, elevated creatinine  of 5.15. 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 She has admitted to ICU since requiring pressor and started on broad spectrum antibiotics, cultures pending. The ID consult requested to assist with further evaluation and antibiotic  management.    # Septic shock  # GRAM negative bacteremia  # Infected Decubitus ulcer with Left gluteal  Abscess and Osteomyelitis  # UTI    Would recommend:  1. Surgical evaluation of debridement decubitus ulcer and drainage of Left gluteal abscess with deep culture  2. Follow up Fianl Urine and blood cultures  3. Continue Levaquin and Meropenem  4. Monitor WBC count  5. Repeat Blood cultuers x 2 in AM  6. Titrate down pressors as tolerated  7. frequent repositioning    d/w ICU team    will follow the patient with you

## 2018-03-12 NOTE — DIETITIAN INITIAL EVALUATION ADULT. - PROBLEM SELECTOR PLAN 6
Patient looks cachectic with temporal wasting , decreased po intake, decreased appetite, scaphoid abdomen and unstageable sacral ulcers  •Palliative consult for possible hospice

## 2018-03-12 NOTE — CONSULT NOTE ADULT - PROBLEM SELECTOR RECOMMENDATION 2
FAST 7E.  Pt bedbound, nonverbal, dependent on all ADLs. + skin failure; comorbid severe PCM. Overall poor prognosis. Pt eligible for residential hospice. Family meeting tomorrow 12:00 to discuss pt's dementia disease trajectory, goals of care, and advance directives. Pt remains a full code at this time.
abx  ID f/u

## 2018-03-12 NOTE — DIETITIAN INITIAL EVALUATION ADULT. - OTHER INFO
wt noted by RD 5/19/17 50.kg (thus ~ 21# or ~19% weight loss <1 year. Pressure injuries noted including Stage 4 sacrum. Overall poor prognosis noted

## 2018-03-12 NOTE — PROGRESS NOTE ADULT - SUBJECTIVE AND OBJECTIVE BOX
REVIEW OF SYSTEMS: Unable to obtain since patient is nonverbal unless mentioned in HPI      ICU Vital Signs Last 24 Hrs  T(C): 36.5 (12 Mar 2018 21:47), Max: 37.2 (12 Mar 2018 07:30)  T(F): 97.7 (12 Mar 2018 21:47), Max: 98.9 (12 Mar 2018 07:30)  HR: 101 (12 Mar 2018 21:00) (52 - 107)  BP: 97/73 (12 Mar 2018 21:00) (63/33 - 133/94)  BP(mean): 78 (12 Mar 2018 21:00) (39 - 106)  ABP: --  ABP(mean): --  RR: 16 (12 Mar 2018 21:00) (0 - 35)  SpO2: 100% (12 Mar 2018 21:00) (67% - 100%)          ALLERGIES: NKDA            PHYSICAL EXAM:  GENERAL: Not in acute distress, On VM  CVS: s1 and s2 present  RESP: Air entry B/L  GI: Abdomen nondistended and nontender  BACK: Stage IV  decubital ulcer with malodorous drainage  EXT: No pedal edema   CNS: Non verbal/ Letahrgy          LABS:                        10.1   25.8  )-----------( 354      ( 12 Mar 2018 06:44 )             34.3                           10.5   20.9  )-----------( 306      ( 11 Mar 2018 18:21 )             34.6       03-12    151<H>  |  125<H>  |  68<H>  ----------------------------<  381<H>  4.5   |  15<L>  |  1.61<H>    Ca    7.8<L>      12 Mar 2018 17:38  Phos  2.0     03-12  Mg     2.4     03-12    TPro  6.5  /  Alb  1.6<L>  /  TBili  0.2  /  DBili  x   /  AST  28  /  ALT  29  /  AlkPhos  69  03-11             Urinalysis Basic - ( 11 Mar 2018 13:40 )    Color: Yellow / Appearance: Turbid / S.010 / pH: x  Gluc: x / Ketone: Negative  / Bili: Small / Urobili: 1   Blood: x / Protein: 100 / Nitrite: Positive   Leuk Esterase: Moderate / RBC: 5-10 /HPF / WBC >50 /HPF   Sq Epi: x / Non Sq Epi: Occasional /HPF / Bacteria: TNTC /HPF      CAPILLARY BLOOD GLUCOSE      POCT Blood Glucose.: 100 mg/dL (11 Mar 2018 19:52)  POCT Blood Glucose.: 125 mg/dL (11 Mar 2018 18:56)  POCT Blood Glucose.: 167 mg/dL (11 Mar 2018 18:02)  POCT Blood Glucose.: 222 mg/dL (11 Mar 2018 17:14)  POCT Blood Glucose.: 200 mg/dL (11 Mar 2018 17:12)  POCT Blood Glucose.: 422 mg/dL (11 Mar 2018 15:02)  POCT Blood Glucose.: 467 mg/dL (11 Mar 2018 13:41)  POCT Blood Glucose.: 458 mg/dL (11 Mar 2018 11:42)    ABG - ( 11 Mar 2018 12:52 )  pH: 7.28  /  pCO2: 32    /  pO2: 49    / HCO3: 14    / Base Excess: -11.2 /  SaO2: 78            MEDICATIONS  (STANDING):  chlorhexidine 2% Cloths 1 Application(s) Topical daily  dextrose 5%. 1000 milliLiter(s) (50 mL/Hr) IV Continuous <Continuous>  dextrose 50% Injectable 12.5 Gram(s) IV Push once  dextrose 50% Injectable 25 Gram(s) IV Push once  dextrose 50% Injectable 25 Gram(s) IV Push once  heparin  Injectable 5000 Unit(s) SubCutaneous every 12 hours  insulin glargine Injectable (LANTUS) 6 Unit(s) SubCutaneous at bedtime  insulin lispro (HumaLOG) corrective regimen sliding scale   SubCutaneous every 6 hours  levoFLOXacin IVPB 250 milliGRAM(s) IV Intermittent every 48 hours  meropenem  IVPB 500 milliGRAM(s) IV Intermittent every 12 hours  norepinephrine Infusion 0.5 MICROgram(s)/kG/Min (19.125 mL/Hr) IV Continuous <Continuous>  pantoprazole  Injectable 40 milliGRAM(s) IV Push daily    MEDICATIONS  (PRN):  dextrose Gel 1 Dose(s) Oral once PRN Blood Glucose LESS THAN 70 milliGRAM(s)/deciliter  glucagon  Injectable 1 milliGRAM(s) IntraMuscular once PRN Glucose LESS THAN 70 milligrams/deciliter          RADIOLOGY & ADDITIONAL TESTS:    3/11/18 CT Abdomen and Pelvis No Cont (18 @ 15:51) Sacral decubitus ulcer with suspected osteomyelitis and possible intramuscular left gluteal abscesses. Patient is seen and examined at the bed side, is afebrile. She remains on pressor and surgical debridement of sacral ulcer still pending. The Leukocytosis is worsening. The blood cultures growing GRAM Negative RODS.           REVIEW OF SYSTEMS: Unable to obtain since patient is nonverbal         ICU Vital Signs Last 24 Hrs  T(C): 36.5 (12 Mar 2018 21:47), Max: 37.2 (12 Mar 2018 07:30)  T(F): 97.7 (12 Mar 2018 21:47), Max: 98.9 (12 Mar 2018 07:30)  HR: 101 (12 Mar 2018 21:00) (52 - 107)  BP: 97/73 (12 Mar 2018 21:00) (63/33 - 133/94)  BP(mean): 78 (12 Mar 2018 21:00) (39 - 106)  ABP: --  ABP(mean): --  RR: 16 (12 Mar 2018 21:00) (0 - 35)  SpO2: 100% (12 Mar 2018 21:00) (67% - 100%)          ALLERGIES: NKDA            PHYSICAL EXAM:  GENERAL: Not in  distress  CVS: s1 and s2 present  RESP: Air entry B/L  GI: Abdomen nondistended and nontender  BACK: Stage IV  decubital ulcer with malodorous drainage  EXT: No pedal edema   CNS: Non verbal          LABS:                        10.1   25.8  )-----------( 354      ( 12 Mar 2018 06:44 )             34.3                           10.5   20.9  )-----------( 306      ( 11 Mar 2018 18:21 )             34.6       03-12    151<H>  |  125<H>  |  68<H>  ----------------------------<  381<H>  4.5   |  15<L>  |  1.61<H>    Ca    7.8<L>      12 Mar 2018 17:38  Phos  2.0     03-12  Mg     2.4     03-12    TPro  6.5  /  Alb  1.6<L>  /  TBili  0.2  /  DBili  x   /  AST  28  /  ALT  29  /  AlkPhos  69  03-11             Urinalysis Basic - ( 11 Mar 2018 13:40 )    Color: Yellow / Appearance: Turbid / S.010 / pH: x  Gluc: x / Ketone: Negative  / Bili: Small / Urobili: 1   Blood: x / Protein: 100 / Nitrite: Positive   Leuk Esterase: Moderate / RBC: 5-10 /HPF / WBC >50 /HPF   Sq Epi: x / Non Sq Epi: Occasional /HPF / Bacteria: TNTC /HPF      CAPILLARY BLOOD GLUCOSE      POCT Blood Glucose.: 100 mg/dL (11 Mar 2018 19:52)  POCT Blood Glucose.: 125 mg/dL (11 Mar 2018 18:56)  POCT Blood Glucose.: 167 mg/dL (11 Mar 2018 18:02)  POCT Blood Glucose.: 222 mg/dL (11 Mar 2018 17:14)  POCT Blood Glucose.: 200 mg/dL (11 Mar 2018 17:12)  POCT Blood Glucose.: 422 mg/dL (11 Mar 2018 15:02)  POCT Blood Glucose.: 467 mg/dL (11 Mar 2018 13:41)  POCT Blood Glucose.: 458 mg/dL (11 Mar 2018 11:42)    ABG - ( 11 Mar 2018 12:52 )  pH: 7.28  /  pCO2: 32    /  pO2: 49    / HCO3: 14    / Base Excess: -11.2 /  SaO2: 78            MEDICATIONS  (STANDING):  chlorhexidine 2% Cloths 1 Application(s) Topical daily  dextrose 5%. 1000 milliLiter(s) (50 mL/Hr) IV Continuous <Continuous>  dextrose 50% Injectable 12.5 Gram(s) IV Push once  dextrose 50% Injectable 25 Gram(s) IV Push once  dextrose 50% Injectable 25 Gram(s) IV Push once  heparin  Injectable 5000 Unit(s) SubCutaneous every 12 hours  insulin glargine Injectable (LANTUS) 6 Unit(s) SubCutaneous at bedtime  insulin lispro (HumaLOG) corrective regimen sliding scale   SubCutaneous every 6 hours  levoFLOXacin IVPB 250 milliGRAM(s) IV Intermittent every 48 hours  meropenem  IVPB 500 milliGRAM(s) IV Intermittent every 12 hours  norepinephrine Infusion 0.5 MICROgram(s)/kG/Min (19.125 mL/Hr) IV Continuous <Continuous>  pantoprazole  Injectable 40 milliGRAM(s) IV Push daily    MEDICATIONS  (PRN):  dextrose Gel 1 Dose(s) Oral once PRN Blood Glucose LESS THAN 70 milliGRAM(s)/deciliter  glucagon  Injectable 1 milliGRAM(s) IntraMuscular once PRN Glucose LESS THAN 70 milligrams/deciliter          RADIOLOGY & ADDITIONAL TESTS:    3/11/18 CT Abdomen and Pelvis No Cont (18 @ 15:51) Sacral decubitus ulcer with suspected osteomyelitis and possible intramuscular left gluteal abscesses.      MICROBIOLOGY DATA:      Culture - Blood (18 @ 21:58)    -  Multidrug (KPC pos) resistant organism: Nondet    -  Staphylococcus aureus: Nondet    -  Methicillin resistant Staphylococcus aureus (MRSA): Nondet    -  Coagulase negative Staphylococcus: Nondet    -  Enterococcus species: Nondet    -  Vancomycin resistant Enterococcus sp.: Nondet    -  Escherichia coli: Nondet    -  Klebsiella oxytoca: Nondet    -  Klebsiella pneumoniae: Nondet    -  Serratia marcescens: Nondet    -  Haemophilus influenzae: Nondet    -  Listeria monocytogenes: Nondet    -  Neisseria meningitidis: Nondet    -  Pseudomonas aeruginosa: Nondet    -  Acinetobacter baumanii: Nondet    -  Enterobacter cloacae complex: Nondet    -  Streptococcus sp. (Not Grp A, B or S pneumoniae): Nondet    -  Streptococcus agalactiae (Group B): Nondet    -  Streptococcus pyogenes (Group A): Nondet    -  Streptococcus pneumoniae: Nondet    -  Candida albicans: Nondet    -  Candida glabrata: Nondet    -  Candida krusei: Nondet  Culture - Blood (18 @ 21:58)    Gram Stain:   Growth in anaerobic bottle: Gram Negative Rods    Specimen Source: .Blood Blood-Peripheral    Culture Results:   Growth in anaerobic bottle: Gram Negative Rods      -  Candida parapsilosis: Nondet    -  Candida tropicalis: Nondet    Gram Stain:   Growth in anaerobic bottle: Gram Negative Rods    Specimen Source: .Blood Blood-Peripheral    Organism: Blood Culture PCR    Culture Results:   Growth in anaerobic bottle: Gram Negative Rods  "Due to technical problems, Proteus sp. will Not be reported as part of  the BCID panel until further notice"  ***Blood Panel PCR results on this specimen are available  approximately 3 hours after the Gram stain result.***  Gram stain, PCR, and/or culture results may not always  correspond due to difference in methodologies.  ************************************************************  This PCR assay was performed using [employer].  The following targets are tested for: Enterococcus,  vancomycin resistant enterococci, Listeria monocytogenes,  coagulase negative staphylococci, S. aureus,  methicillin resistant S. aureus, Streptococcus agalactiae  (Group B), S. pneumoniae, S. pyogenes (Group A),  Acinetobacter baumannii, Enterobacter cloacae, E. coli,  Klebsiella oxytoca, K. pneumoniae, Proteus sp.,  Serratia marcescens, Haemophilus influenzae,  Neisseria meningitidis, Pseudomonas aeruginosa, Candida  albicans, C. glabrata, C krusei, C parapsilosis,  C. tropicalis and the KPC resistance gene.    Organism Identification: Blood Culture PCR    Method Type: PCR    Culture - Urine (18 @ 21:38)    Specimen Source: .Urine Catheterized    Culture Results:   >100,000 CFU/ml Proteus mirabilis    Rapid Respiratory Viral Panel (18 @ 14:03)    Rapid RVP Result: NotDetec: The [employer] RVP Rapid uses polymerase chain reaction (PCR) and melt  curve analysis to screen for adenovirus; coronavirus HKU1, NL63, 229E,  OC43; human metapneumovirus (hMPV); human enterovirus/rhinovirus  (Entero/RV); influenza A; influenza A/H1;influenza A/H3; influenza  A/H1-2009; influenza B; parainfluenza viruses 1, 2, 3, 4; respiratory  syncytial virus; Bordetella pertussis; Mycoplasma pneumoniae; and  Chlamydophila pneumoniae.

## 2018-03-12 NOTE — CONSULT NOTE ADULT - SUBJECTIVE AND OBJECTIVE BOX
Patient is a 83y old  Female who presents with a chief complaint of acute renal failure with hyperkalemia, hyperosmolar hyperglycemic state secondary to diabetes and sepsis (11 Mar 2018 14:55)      HPI  Called see and eval 83y.o. Female w/PMH significant for DM, dementia for sacral decubitus ulcer. Pt sent in from NH yesterday for hyperglycemia. FSG on admission 458. Wbc 27. UA positive. Awaiting pan cultures. CT abd/pelvis obtained for source of fever.     PAST MEDICAL & SURGICAL HISTORY:  PAD (peripheral artery disease)  Overactive bladder  GERD (gastroesophageal reflux disease)  Dyslipidemia  HTN (hypertension)  Pancreatitis  Dementia  Diabetes mellitus, type 2  Lung nodule: Right Lung Surgery  - 2014  History of tonsillectomy  PAD (peripheral artery disease): s/p left lower extremity stent  History of knee replacement, total, bilateral      MEDICATIONS  (STANDING):  chlorhexidine 2% Cloths 1 Application(s) Topical daily  dextrose 5%. 1000 milliLiter(s) (125 mL/Hr) IV Continuous <Continuous>  heparin  Injectable 5000 Unit(s) SubCutaneous every 12 hours  insulin Infusion 4 Unit(s)/Hr (4 mL/Hr) IV Continuous <Continuous>  levoFLOXacin IVPB 250 milliGRAM(s) IV Intermittent every 48 hours  meropenem  IVPB 500 milliGRAM(s) IV Intermittent every 12 hours  pantoprazole  Injectable 40 milliGRAM(s) IV Push daily  phenylephrine    Infusion 0.5 MICROgram(s)/kG/Min (3.825 mL/Hr) IV Continuous <Continuous>    MEDICATIONS  (PRN):      Allergies    penicillin (Rash)  sulfa drugs (Unknown)    Intolerances        Vital Signs Last 24 Hrs  T(C): 36.7 (12 Mar 2018 11:52), Max: 37.2 (12 Mar 2018 07:30)  T(F): 98 (12 Mar 2018 11:52), Max: 98.9 (12 Mar 2018 07:30)  HR: 63 (12 Mar 2018 12:02) (57 - 94)  BP: 121/57 (12 Mar 2018 12:02) (63/33 - 129/102)  BP(mean): 75 (12 Mar 2018 12:02) (39 - 107)  RR: 17 (12 Mar 2018 12:02) (11 - 35)  SpO2: 100% (12 Mar 2018 12:02) (61% - 100%)    Physical:  Gen: A&Ox3. NAD  Abd: Soft ND, NT        I&O's Detail    11 Mar 2018 07:  -  12 Mar 2018 07:00  --------------------------------------------------------  IN:    dextrose 5% + sodium chloride 0.45%.: 125 mL    dextrose 5% + sodium chloride 0.9%: 975 mL    insulin Infusion: 10 mL    insulin Infusion: 22 mL    IV PiggyBack: 300 mL    lactated ringers.: 300 mL    phenylephrine   Infusion: 102.6 mL    sodium chloride 0.45%: 375 mL    Sodium Chloride 0.9% IV Bolus: 2000 mL    Solution: 300 mL  Total IN: 4509.6 mL    OUT:    Indwelling Catheter - Urethral: 1425 mL  Total OUT: 1425 mL    Total NET: 3084.6 mL      12 Mar 2018 07:  -  12 Mar 2018 13:25  --------------------------------------------------------  IN:    dextrose 5% + sodium chloride 0.45%.: 375 mL    dextrose 5%.: 125 mL    Lactated Ringers IV Bolus: 1000 mL    phenylephrine   Infusion: 122.4 mL  Total IN: 1622.4 mL    OUT:    Indwelling Catheter - Urethral: 410 mL  Total OUT: 410 mL    Total NET: 1212.4 mL          LABS:                        10.1   25.8  )-----------( 354      ( 12 Mar 2018 06:44 )             34.3              -12    161<HH>  |  135<H>  |  93<H>  ----------------------------<  98  4.6   |  13<L>  |  2.13<H>    Ca    7.6<L>      12 Mar 2018 06:44  Phos  2.0       Mg     2.4         TPro  6.5  /  Alb  1.6<L>  /  TBili  0.2  /  DBili  x   /  AST  28  /  ALT  29  /  AlkPhos  69              PT/INR - ( 11 Mar 2018 18:21 )   PT: 18.1 sec;   INR: 1.64 ratio           Urinalysis Basic - ( 11 Mar 2018 13:40 )    Color: Yellow / Appearance: Turbid / S.010 / pH: x  Gluc: x / Ketone: Negative  / Bili: Small / Urobili: 1   Blood: x / Protein: 100 / Nitrite: Positive   Leuk Esterase: Moderate / RBC: 5-10 /HPF / WBC >50 /HPF   Sq Epi: x / Non Sq Epi: Occasional /HPF / Bacteria: TNTC /HPF        RADIOLOGY & ADDITIONAL STUDIES: Patient is a 83y old  Female who presents with a chief complaint of acute renal failure with hyperkalemia, hyperosmolar hyperglycemic state secondary to diabetes and sepsis (11 Mar 2018 14:55)      HPI  Called see and eval 83y.o. Female w/PMH significant for DM, dementia for sacral decubitus ulcer. Pt sent in from NH yesterday for hyperglycemia. FSG on admission 458. Wbc 27. UA positive. Awaiting pan cultures. CT abd/pelvis obtained for source of fever - pt noted to have sacral decubitus ulcer with suspected OM, possible L gluteal abscess. Pt unable to provide further history. Currently on levofloxacin, meropenem, pressor. As per nurse, purulent drainage noted when area examined by wound care team.    PAST MEDICAL & SURGICAL HISTORY:  PAD (peripheral artery disease)  Overactive bladder  GERD (gastroesophageal reflux disease)  Dyslipidemia  HTN (hypertension)  Pancreatitis  Dementia  Diabetes mellitus, type 2  Lung nodule: Right Lung Surgery  - 1/2014  History of tonsillectomy  PAD (peripheral artery disease): s/p left lower extremity stent  History of knee replacement, total, bilateral      MEDICATIONS  (STANDING):  chlorhexidine 2% Cloths 1 Application(s) Topical daily  dextrose 5%. 1000 milliLiter(s) (125 mL/Hr) IV Continuous <Continuous>  heparin  Injectable 5000 Unit(s) SubCutaneous every 12 hours  insulin Infusion 4 Unit(s)/Hr (4 mL/Hr) IV Continuous <Continuous>  levoFLOXacin IVPB 250 milliGRAM(s) IV Intermittent every 48 hours  meropenem  IVPB 500 milliGRAM(s) IV Intermittent every 12 hours  pantoprazole  Injectable 40 milliGRAM(s) IV Push daily  phenylephrine    Infusion 0.5 MICROgram(s)/kG/Min (3.825 mL/Hr) IV Continuous <Continuous>    Allergies  penicillin (Rash)  sulfa drugs (Unknown)    Vital Signs Last 24 Hrs  T(F): 98 (12 Mar 2018 11:52), Max: 98.9 (12 Mar 2018 07:30)  HR: 63 (12 Mar 2018 12:02) (57 - 94)  BP: 121/57 (12 Mar 2018 12:02) (63/33 - 129/102)  RR: 17 (12 Mar 2018 12:02) (11 - 35)  SpO2: 100% (12 Mar 2018 12:02) (61% - 100%)    Physical:  Gen: NAD  Back: Large 8x7cm sacral decubitus ulcer to bone. Sacrum exposed, jagged edges, sharp. Wound base pink with necrotic slough throughout Extensive undermining at left, right, and inferior aspect of wound, from 5-8cm. No drainage expressed    LABS:                        10.1   25.8  )-----------( 354      ( 12 Mar 2018 06:44 )             34.3              03-12    RADIOLOGY & ADDITIONAL STUDIES:  < from: CT Abdomen and Pelvis No Cont (03.11.18 @ 15:51) >  BONES: Soft tissue defect along the sacrum with foci of subcutaneous air,   extending to the level of the sacrum and coccygeal bone. Intramuscular   foci of air in the left gluteal musculature, possibly abscesses.   Degenerative changes of the spine.    < end of copied text >

## 2018-03-12 NOTE — ADVANCED PRACTICE NURSE CONSULT - ASSESSMENT
This is a 83yr old female patient admitted for Diabetes, presenting with the following:  -There is an intact DTI to the R. Heel (3cm x 3cm0 without drainage  -There is a Stage 4 Pressure Injury to the Sacrum (8cm x 7cm x 1.3cm) with bone, pink tissue, necrotic tissue, slough, purulent drainage, odor, and undermining (up to 6.4cm at 36o degrees)

## 2018-03-12 NOTE — CONSULT NOTE ADULT - SUBJECTIVE AND OBJECTIVE BOX
HPI:  82 y/o from NH was sent in for hyperglycemia and failure to thrive. Family reports pt has been less interactive x 1 month with poor PO intake. Pt in ICU 2/2 acute renal failure with hyperkalemia, hyperosmolar hyperglycemic state secondary to diabetes and sepsis. Full code on file.       PAST MEDICAL & SURGICAL HISTORY:  PAD (peripheral artery disease)  Overactive bladder  GERD (gastroesophageal reflux disease)  Dyslipidemia  HTN (hypertension)  Pancreatitis  Dementia  Diabetes mellitus, type 2  Lung nodule: Right Lung Surgery  - 2014  History of tonsillectomy  PAD (peripheral artery disease): s/p left lower extremity stent  History of knee replacement, total, bilateral      SOCIAL HISTORY:    Admitted from:  NH  Home Opioid hx:  Scientologist:                                    Preferred Language: English    Surrogate/HCP/Guardian:  Ruba (dtr): [phone]    FAMILY HISTORY: noncontributory to current condition    Baseline ADLs (prior to admission): Per NH report, pt bedbound, nonverbal, dependent on all ADLs.     Allergies:  penicillin (Rash)  sulfa drugs (Unknown)    Present Symptoms:      Review of Systems: Unable to obtain due to poor mentation    MEDICATIONS  (STANDING):  chlorhexidine 2% Cloths 1 Application(s) Topical daily  dextrose 5%. 1000 milliLiter(s) (125 mL/Hr) IV Continuous <Continuous>  dextrose 5%. 1000 milliLiter(s) (50 mL/Hr) IV Continuous <Continuous>  dextrose 50% Injectable 12.5 Gram(s) IV Push once  dextrose 50% Injectable 25 Gram(s) IV Push once  dextrose 50% Injectable 25 Gram(s) IV Push once  heparin  Injectable 5000 Unit(s) SubCutaneous every 12 hours  insulin glargine Injectable (LANTUS) 6 Unit(s) SubCutaneous at bedtime  insulin lispro (HumaLOG) corrective regimen sliding scale   SubCutaneous every 6 hours  levoFLOXacin IVPB 250 milliGRAM(s) IV Intermittent every 48 hours  meropenem  IVPB 500 milliGRAM(s) IV Intermittent every 12 hours  pantoprazole  Injectable 40 milliGRAM(s) IV Push daily  phenylephrine    Infusion 0.5 MICROgram(s)/kG/Min (3.825 mL/Hr) IV Continuous <Continuous>    MEDICATIONS  (PRN):  dextrose Gel 1 Dose(s) Oral once PRN Blood Glucose LESS THAN 70 milliGRAM(s)/deciliter  glucagon  Injectable 1 milliGRAM(s) IntraMuscular once PRN Glucose LESS THAN 70 milligrams/deciliter      PHYSICAL EXAM:    Vital Signs Last 24 Hrs  T(C): 36.7 (12 Mar 2018 11:52), Max: 37.2 (12 Mar 2018 07:30)  T(F): 98 (12 Mar 2018 11:52), Max: 98.9 (12 Mar 2018 07:30)  HR: 66 (12 Mar 2018 14:08) (53 - 94)  BP: 90/52 (12 Mar 2018 14:08) (63/33 - 129/102)  BP(mean): 61 (12 Mar 2018 14:08) (39 - 107)  RR: 20 (12 Mar 2018 14:08) (0 - 35)  SpO2: 81% (12 Mar 2018 14:08) (61% - 100%)    General: pt responsive to tactile stimuli, nonverbal, not oriented, cachexia  Karnofsky Performance Score/Palliative Performance Status Version2:     20%    HEENT: dry lips, + temporal wasting  Lungs: comfortable   CV: normal    GI: incontinent  :   incontinent   Musculoskeletal: bedbound, dependent on ADLs at baseline, loss of muscle mass/subcutaneous fat  Skin: DTI to the R. 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) with bone, pink tissue, necrotic tissue, slough, purulent drainage, odor, and undermining 	  Neuro: pt nonverbal, responsive to tactile stimuli, not oriented, unable to follow commands, + dysphagia  Diet: NPO    LABS:                        10.1   25.8  )-----------( 354      ( 12 Mar 2018 06:44 )             34.3     03-12    161<HH>  |  135<H>  |  93<H>  ----------------------------<  98  4.6   |  13<L>  |  2.13<H>    Ca    7.6<L>      12 Mar 2018 06:44  Phos  2.0     03-12  Mg     2.4     03-12    TPro  6.5  /  Alb  1.6<L>  /  TBili  0.2  /  DBili  x   /  AST  28  /  ALT  29  /  AlkPhos  69  03-11    Urinalysis Basic - ( 11 Mar 2018 13:40 )    Color: Yellow / Appearance: Turbid / S.010 / pH: x  Gluc: x / Ketone: Negative  / Bili: Small / Urobili: 1   Blood: x / Protein: 100 / Nitrite: Positive   Leuk Esterase: Moderate / RBC: 5-10 /HPF / WBC >50 /HPF   Sq Epi: x / Non Sq Epi: Occasional /HPF / Bacteria: TNTC /HPF    Albumin: 1.6    RADIOLOGY & ADDITIONAL STUDIES:    ADVANCE DIRECTIVES:   Advanced Care Planning discussion total time spent: HPI:  82 y/o from NH was sent in for hyperglycemia and failure to thrive. Family reports pt has been less interactive x 1 month with poor PO intake. Pt in ICU 2/2 acute renal failure with hyperkalemia, hyperosmolar hyperglycemic state secondary to diabetes and sepsis. Now with central line and pressor; stage IV sacral ulcer. Overall poor prognosis; full code on file.     PAST MEDICAL & SURGICAL HISTORY:  PAD (peripheral artery disease)  Overactive bladder  GERD (gastroesophageal reflux disease)  Dyslipidemia  HTN (hypertension)  Pancreatitis  Dementia  Diabetes mellitus, type 2  Lung nodule: Right Lung Surgery  - 2014  History of tonsillectomy  PAD (peripheral artery disease): s/p left lower extremity stent  History of knee replacement, total, bilateral      SOCIAL HISTORY:    Admitted from:  [facility]  Home Opioid hx: none  Preferred Language: English    Surrogate/HCP/Guardian:  Ruba Mendieta (dtr): [phone]    FAMILY HISTORY: noncontributory to current condition    Baseline ADLs (prior to admission): Per NH report, pt bedbound, nonverbal, dependent on all ADLs.     Allergies:  penicillin (Rash)  sulfa drugs (Unknown)    Present Symptoms:      Review of Systems: Unable to obtain due to poor mentation    MEDICATIONS  (STANDING):  chlorhexidine 2% Cloths 1 Application(s) Topical daily  dextrose 5%. 1000 milliLiter(s) (125 mL/Hr) IV Continuous <Continuous>  dextrose 5%. 1000 milliLiter(s) (50 mL/Hr) IV Continuous <Continuous>  dextrose 50% Injectable 12.5 Gram(s) IV Push once  dextrose 50% Injectable 25 Gram(s) IV Push once  dextrose 50% Injectable 25 Gram(s) IV Push once  heparin  Injectable 5000 Unit(s) SubCutaneous every 12 hours  insulin glargine Injectable (LANTUS) 6 Unit(s) SubCutaneous at bedtime  insulin lispro (HumaLOG) corrective regimen sliding scale   SubCutaneous every 6 hours  levoFLOXacin IVPB 250 milliGRAM(s) IV Intermittent every 48 hours  meropenem  IVPB 500 milliGRAM(s) IV Intermittent every 12 hours  pantoprazole  Injectable 40 milliGRAM(s) IV Push daily  phenylephrine    Infusion 0.5 MICROgram(s)/kG/Min (3.825 mL/Hr) IV Continuous <Continuous>    MEDICATIONS  (PRN):  dextrose Gel 1 Dose(s) Oral once PRN Blood Glucose LESS THAN 70 milliGRAM(s)/deciliter  glucagon  Injectable 1 milliGRAM(s) IntraMuscular once PRN Glucose LESS THAN 70 milligrams/deciliter      PHYSICAL EXAM:    Vital Signs Last 24 Hrs  T(C): 36.7 (12 Mar 2018 11:52), Max: 37.2 (12 Mar 2018 07:30)  T(F): 98 (12 Mar 2018 11:52), Max: 98.9 (12 Mar 2018 07:30)  HR: 66 (12 Mar 2018 14:08) (53 - 94)  BP: 90/52 (12 Mar 2018 14:08) (63/33 - 129/102)  BP(mean): 61 (12 Mar 2018 14:08) (39 - 107)  RR: 20 (12 Mar 2018 14:08) (0 - 35)  SpO2: 81% (12 Mar 2018 14:08) (61% - 100%)    General: pt responsive to tactile stimuli, nonverbal, not oriented, cachexia  Karnofsky Performance Score/Palliative Performance Status Version2:     20%    HEENT: dry lips, + temporal wasting  Lungs: comfortable   CV: normal    GI: incontinent  :   incontinent   Musculoskeletal: bedbound, dependent on ADLs at baseline, loss of muscle mass/subcutaneous fat  Skin: DTI to the R. 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) with bone, pink tissue, necrotic tissue, slough, purulent drainage, odor, and undermining 	  Neuro: pt nonverbal, responsive to tactile stimuli, not oriented, unable to follow commands, + dysphagia  Diet: NPO    LABS:                        10.1   25.8  )-----------( 354      ( 12 Mar 2018 06:44 )             34.3     03-12    161<HH>  |  135<H>  |  93<H>  ----------------------------<  98  4.6   |  13<L>  |  2.13<H>    Ca    7.6<L>      12 Mar 2018 06:44  Phos  2.0     03-12  Mg     2.4     03-12    TPro  6.5  /  Alb  1.6<L>  /  TBili  0.2  /  DBili  x   /  AST  28  /  ALT  29  /  AlkPhos  69  03-11    Urinalysis Basic - ( 11 Mar 2018 13:40 )    Color: Yellow / Appearance: Turbid / S.010 / pH: x  Gluc: x / Ketone: Negative  / Bili: Small / Urobili: 1   Blood: x / Protein: 100 / Nitrite: Positive   Leuk Esterase: Moderate / RBC: 5-10 /HPF / WBC >50 /HPF   Sq Epi: x / Non Sq Epi: Occasional /HPF / Bacteria: TNTC /HPF    Albumin: 1.6    RADIOLOGY & ADDITIONAL STUDIES:  < from: CT Abdomen and Pelvis No Cont (18 @ 15:51) >  EXAM:  CT ABDOMEN AND PELVIS                            PROCEDURE DATE:  2018          INTERPRETATION:  CLINICAL INFORMATION: Sepsis, altered mental status,   acute renal failure.    COMPARISON: July 3, 2013.    PROCEDURE:   CT of the Abdomenand Pelvis was performed without intravenous contrast.   Intravenous contrast: None.  Oral contrast: None.  Sagittal and coronal reformats were performed.    FINDINGS:    LOWER CHEST: Bibasilar atelectasis.    LIVER: Within normal limits.  BILE DUCTS: Normal caliber.   GALLBLADDER: Within normal limits.  SPLEEN: Within normal limits.  PANCREAS: Within normal limits.  ADRENALS: Within normal limits.  KIDNEYS/URETERS: Right renal cyst.     BLADDER: Contains a Peck catheter balloon.  REPRODUCTIVE ORGANS: No gross pelvic mass.    BOWEL: No bowel obstruction. Stool distended rectum.  PERITONEUM: No ascites.  VESSELS:  Atherosclerotic change of the abdominal aorta and its branches.  RETROPERITONEUM: No lymphadenopathy.    ABDOMINAL WALL: Within normal limits.  BONES: Soft tissue defect along the sacrum with foci of subcutaneous air,   extending to the level of the sacrum and coccygeal bone. Intramuscular   foci of air in the left gluteal musculature, possibly abscesses.   Degenerative changes of the spine.    IMPRESSION: Sacral decubitus ulcer with suspected osteomyelitis and   possible intramuscular left gluteal abscesses.      < end of copied text >      ADVANCE DIRECTIVES: Full code

## 2018-03-12 NOTE — PROGRESS NOTE ADULT - PROBLEM SELECTOR PLAN 4
- Corrected sodium of 164 on admission , Hypovolemic hypernatremia  - free water deficient of 2.8 L   - S/p 4 l NS. + 2 l over night improved to 161  - free water deficient of 2.8 L   - S/p 4 l NS. + 2 l over night  on D5 @ 125

## 2018-03-12 NOTE — CONSULT NOTE ADULT - SUBJECTIVE AND OBJECTIVE BOX
Problem List:  Patient with a history of Alzheimer disease residing in a NH.  She was brought to the ER for increased lethargy and fever.  She was hypotensive concha arrival with BP was 90/56 and HR was 108.  She was treated with broad spectrum antibiotics for sespsis and with IV fluids for hypotension . sepsis and Hyperglycemia.   MALIKA non oliguric improved with hydration.  Source of infection sacral decubitus.      Renal consult was called for Hypernatremia since admission, admission.    Comprehensive Metabolic Panel (03.11.18 @ 12:18)    Sodium, Serum: 156 mmol/L    Potassium, Serum: 5.6: NOT HEMOLYZED mmol/L    Chloride, Serum: 121 mmol/L    Carbon Dioxide, Serum: 18 mmol/L    Anion Gap, Serum: 17 mmol/L    Blood Urea Nitrogen, Serum: 153 mg/dL    Creatinine, Serum: 5.15 mg/dL    Glucose, Serum: 584: TYPE:(C=Critical, N=Notification, A=Abnormal) C  TESTS: GLUC, LA  DATE/TIME CALLED: 03/11/18 12:51  CALLED TO: DR.C VALENTIN  READ BACK (2 Patient Identifiers)(Y/N): _Y  READ BACK VALUES (Y/N): _Y  CALLED BY: LAMBERTO03/11/18 12:51 mg/dL    Calcium, Total Serum: 9.8 mg/dL    Protein Total, Serum: 9.4 g/dL    Albumin, Serum: 2.4 g/dL    Bilirubin Total, Serum: 0.3 mg/dL    Alkaline Phosphatase, Serum: 100 U/L    Aspartate Aminotransferase (AST/SGOT): 28 U/L    Alanine Aminotransferase (ALT/SGPT): 39 U/L DA    eGFR if Non : 7: Interpretative comment    eGFR if : 8 mL/min/1.73M2        Comprehensive Metabolic Panel (03.11.18 @ 18:21)    Sodium, Serum: 162: TYPE:(C=Critical, N=Notification, A=Abnormal)   CALLED BY: PINKY03/11/18 18:50 mmol/L    Potassium, Serum: 3.3 mmol/L    Chloride, Serum: 135 mmol/L    Carbon Dioxide, Serum: 16 mmol/L    Anion Gap, Serum: 11 mmol/L    Blood Urea Nitrogen, Serum: 118 mg/dL    Creatinine, Serum: 2.98 mg/dL    Glucose, Serum: 123 mg/dL    Calcium, Total Serum: 8.1 mg/dL    Protein Total, Serum: 6.5 g/dL    Albumin, Serum: 1.6 g/dL    Bilirubin Total, Serum: 0.2 mg/dL    Alkaline Phosphatase, Serum: 69 U/L    Aspartate Aminotransferase (AST/SGOT): 28 U/L    Alanine Aminotransferase (ALT/SGPT): 29 U/L DA    eGFR if Non : 14: Interpretative comment    eGFR if : 16 mL/min/1.73M2    Basic Metabolic Panel (05.19.17 @ 06:30)    Creatinine, Serum: 1.08 mg/dL    IMPRESSION: Sacral decubitus ulcer with suspected osteomyelitis and   possible intramuscular left gluteal abscesses.  Soft tissue defect along the sacrum with foci of subcutaneous air,   extending to the level of the sacrum and coccygeal bone. Intramuscular   foci of air in the left gluteal musculature, possibly abscesses.   Degenerative changes of the spine.    PAST MEDICAL & SURGICAL HISTORY:  PAD (peripheral artery disease)  Overactive bladder  GERD (gastroesophageal reflux disease)  Dyslipidemia  HTN (hypertension)  Pancreatitis  Dementia  Diabetes mellitus, type 2  Lung nodule: Right Lung Surgery  - 1/2014  History of tonsillectomy  PAD (peripheral artery disease): s/p left lower extremity stent  History of knee replacement, total, bilateral      penicillin (Rash)  sulfa drugs (Unknown)      MEDICATIONS  (STANDING):  chlorhexidine 2% Cloths 1 Application(s) Topical daily  dextrose 5%. 1000 milliLiter(s) (125 mL/Hr) IV Continuous <Continuous>  heparin  Injectable 5000 Unit(s) SubCutaneous every 12 hours  insulin Infusion 4 Unit(s)/Hr (4 mL/Hr) IV Continuous <Continuous>  levoFLOXacin IVPB 250 milliGRAM(s) IV Intermittent every 48 hours  meropenem  IVPB 500 milliGRAM(s) IV Intermittent every 12 hours  pantoprazole  Injectable 40 milliGRAM(s) IV Push daily  phenylephrine    Infusion 0.5 MICROgram(s)/kG/Min (3.825 mL/Hr) IV Continuous <Continuous>    MEDICATIONS  (PRN):                            10.1   25.8  )-----------( 354      ( 12 Mar 2018 06:44 )             34.3     03-12    161<HH>  |  135<H>  |  93<H>  ----------------------------<  98  4.6   |  13<L>  |  2.13<H>    Anion Gap, Serum: 13 mmol/L (03.12.18 @ 06:44)  Real anion gap is 18-20    Urine Microscopic-Add On (NC) (03.11.18 @ 13:40)    Bacteria: TNTC /HPF    Epithelial Cells: Occasional /HPF    Red Blood Cell - Urine: 5-10 /HPF    White Blood Cell - Urine: >50 /HPF    Ca    7.6<L>      12 Mar 2018 06:44  Phos  2.0     03-12  Mg     2.4     03-12    TPro  6.5  /  Alb  1.6<L>  /  TBili  0.2  /  DBili  x   /  AST  28  /  ALT  29  /  AlkPhos  69  03-11    PT/INR - ( 11 Mar 2018 18:21 )   PT: 18.1 sec;   INR: 1.64 ratio         Lactate, Blood: 2.6 mmol/L (03.12.18 @ 06:44)  Lactate, Blood (03.11.18 @ 12:18)    Lactate, Blood: 7.3: TYPE:(C=Critical, N=Notification, A=Abnormal) C    Acetone, Serum: Small (03.11.18 @ 12:18)    Urine Microscopic-Add On (NC) (03.11.18 @ 13:40)    Bacteria: TNTC /HPF    Red Blood Cell - Urine: 5-10 /HPF    Epithelial Cells: Occasional /HPF    White Blood Cell - Urine: >50 /HPF    Urinalysis (03.11.18 @ 13:40)    pH Urine: 7.0    Glucose Qualitative, Urine: Negative    Blood, Urine: Large    Color: Yellow    Urine Appearance: Turbid    Bilirubin: Small    Ketone - Urine: Negative    Specific Gravity: 1.010    Protein, Urine: 100    Urobilinogen: 1    Nitrite: Positive    Leukocyte Esterase Concentration: Moderate        REVIEW OF SYSTEMS:  dementia        VITALS:  T(F): 98 (03-12-18 @ 11:52), Max: 98.9 (03-12-18 @ 07:30)  HR: 63 (03-12-18 @ 12:02)  BP: 121/57 (03-12-18 @ 12:02)  RR: 17 (03-12-18 @ 12:02)  SpO2: 100% (03-12-18 @ 12:02)  Wt(kg): --    03-11 @ 07:01  -  03-12 @ 07:00  --------------------------------------------------------  IN: 4509.6 mL / OUT: 1425 mL / NET: 3084.6 mL    03-12 @ 07:01  -  03-12 @ 12:32  --------------------------------------------------------  IN: 1622.4 mL / OUT: 410 mL / NET: 1212.4 mL    urine output around 50 ml/hour.    PHYSICAL EXAM:  Constitutional: cachectic in no respiratory distress  Neck: No JVD, no carotid bruit, supple, no adenopathy  Respiratory: Good air entrance B/L, no wheezes, rales or rhonchi  Cardiovascular: S1, S2, RRR, no pericardial rub, no murmur  Abdomen: BS+, soft, no tenderness, no bruit  Pelvis: bladder nondistended  Extremities: No cyanosis or clubbing. No peripheral edema.   Neurological: contracted lower and upper extremities respond to tactile stimuli.

## 2018-03-12 NOTE — CONSULT NOTE ADULT - PROBLEM SELECTOR RECOMMENDATION 9
2/2 UTI/sacral ulcer. Pt with + central line, on pheny. Continue IV abx. Pt may benefit from surgical debridement. Goals of care discussion with family scheduled for tomorrow at noon. At this time, pt remains a full code.

## 2018-03-12 NOTE — DIETITIAN INITIAL EVALUATION ADULT. - PROBLEM SELECTOR PLAN 4
- Corrected sodium of 164 on admission , Hypovolemic hypernatremia  - Free water deficit is 4L not accounting for insensible losses  - S/p 4 l NS. Will repeat bmp

## 2018-03-12 NOTE — CONSULT NOTE ADULT - ASSESSMENT
MALIKA on admission due to sepsis, hypotension and hypovolemia.  Metabolic acidosis and increased lactate on admission die to sepssi and ??Metformin  Lactate level normalized.  Renal function improved.    On going metabolic acidosis most likey due to renal failure and not from Metformin, plus NS on admission.    Hypotension improved    Hypernatremia due to dehydration chronic more than  24 hours.  Fluids deficit for sodium of 151 is: 1.4 kg plus 500 plus 1/2 of urine output.  Patient was placed on D5W rate 125 ml hour total 3 liters.    Follow sodium q 8 hours and change fluids if sodium is less than 151 in the next 24 hours.  Control BS.    Hyperkalemia may be due to supplement to follow.  Follow vitamin D level and supplement PO4.
83 y.o. F with stage 4 sacral decubitus ulcer, sacral OM
Patient is a 83y old  Female with multiple co-morbidities including  Dementia, bedbound  and DM2, has sent in to the ER from Nursing Home for evaluation of acute renal failure with hyperkalemia, hyperosmolar hyperglycemic state secondary to diabetes and sepsis (11 Mar 2018 14:55). As per daughters at bedside she has been getting progressively less interactive in the past month and has been eating very little.  In the ER, she found to be afebrile but tachycardic and hypotensive to 90/56. The labs significant for positive Urine analysis, Leukocytosis, lactate of 7.3 and  Blood glucose of 584, elevated creatinine  of 5.15. 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 She has admitted to ICU since requiring pressor and started on broad spectrum antibiotics, cultures pending. The ID consult requested to assist with further evaluation and antibiotic  management.    # Septic shock  # Infected Decubitus ulcer with Left gluteal  Abscess and Osteomyelitis  # UTI    Would recommend:  1. Surgical evaluation of debridement decubitus ulcer and drainage of Left gluteal abscess with deep culture  2. Follow up Urine and blood cultures  3. Vancomycin level in AM and redose based on the level, the Goal level is 15 to 20  4. Monitor WBC count  5. Monitor Kidney function and adjust antibiotic doses accordingly  6. Titrate down pressors as tolerated  7. frequent repositioning    d/w ICU team    will follow the patient with you and make further recommendation based on the clinical course and Lab results  Thank you for the opportunity to participate in Ms. CROCKER's care

## 2018-03-12 NOTE — CONSULT NOTE ADULT - PROBLEM SELECTOR RECOMMENDATION 9
May benefit from surgical debridement, will need to speak with family  con't wound care per wound care team  abx  hygiene  offloading con't wound care per wound care team - chemical debridement w collagenase  abx  hygiene  offloading

## 2018-03-12 NOTE — PROGRESS NOTE ADULT - PROBLEM SELECTOR PLAN 2
secondary to sepsis   •Continue insulin drip per protocol  •Npo for now   •Bmp q4 and fs q1 secondary to sepsis   discontinued insulin drip and started on lantus 6 units with sliding scale   follow sugars closely

## 2018-03-12 NOTE — CONSULT NOTE ADULT - PROBLEM SELECTOR RECOMMENDATION 4
2/2 end stage dementia. Pt bedbound, dependent on all ADLs, nonverbal, unable to follow commands. Pt with significant skin failure. Pt eligible for residential hospice; requires 24 hr care.

## 2018-03-12 NOTE — CONSULT NOTE ADULT - PROBLEM SELECTOR RECOMMENDATION 3
2/2 end stage dementia. Pt with poor PO intake. Pt noted with + temporal wasting, loss of muscle mass. Albumin: 1.6. Need to discuss goals of care with family.

## 2018-03-12 NOTE — PROGRESS NOTE ADULT - SUBJECTIVE AND OBJECTIVE BOX
INTERVAL HPI/OVERNIGHT EVENTS: ***    PRESSORS: [ ] YES [ ] NO  WHICH:    ANTIBIOTICS:                  DATE STARTED:  ANTIBIOTICS:                  DATE STARTED:  ANTIBIOTICS:                  DATE STARTED:    Antimicrobial:  levoFLOXacin IVPB 250 milliGRAM(s) IV Intermittent every 48 hours  meropenem  IVPB 500 milliGRAM(s) IV Intermittent every 12 hours    Cardiovascular:  phenylephrine    Infusion 0.5 MICROgram(s)/kG/Min IV Continuous <Continuous>    Pulmonary:    Hematalogic:  heparin  Injectable 5000 Unit(s) SubCutaneous every 12 hours    Other:  dextrose 5% + sodium chloride 0.45%. 1000 milliLiter(s) IV Continuous <Continuous>  insulin Infusion 4 Unit(s)/Hr IV Continuous <Continuous>  pantoprazole  Injectable 40 milliGRAM(s) IV Push daily    dextrose 5% + sodium chloride 0.45%. 1000 milliLiter(s) IV Continuous <Continuous>  heparin  Injectable 5000 Unit(s) SubCutaneous every 12 hours  insulin Infusion 4 Unit(s)/Hr IV Continuous <Continuous>  levoFLOXacin IVPB 250 milliGRAM(s) IV Intermittent every 48 hours  meropenem  IVPB 500 milliGRAM(s) IV Intermittent every 12 hours  pantoprazole  Injectable 40 milliGRAM(s) IV Push daily  phenylephrine    Infusion 0.5 MICROgram(s)/kG/Min IV Continuous <Continuous>    Drug Dosing Weight  Height (cm): 124.46 (16 May 2017 20:03)  Weight (kg): 40.8 (11 Mar 2018 12:21)  BMI (kg/m2): 26.3 (11 Mar 2018 12:21)  BSA (m2): 1.15 (11 Mar 2018 12:21)    CENTRAL LINE: [ ] YES [ ] NO  LOCATION:   DATE INSERTED:  REMOVE: [ ] YES [ ] NO  EXPLAIN:    BANKS: [ ] YES [ ] NO    DATE INSERTED:  REMOVE:  [ ] YES [ ] NO  EXPLAIN:    A-LINE:  [ ] YES [ ] NO  LOCATION:   DATE INSERTED:  REMOVE:  [ ] YES [ ] NO  EXPLAIN:    PMH -reviewed admission note, no change since admission  Heart faliure: acute [ ] chronic [ ] acute or chronic [ ] diastolic [ ] systolic [ ] combied systolic and diastolic[ ]  MALIKA: ATN[ ] renal medullary necrosis [ ] CKD I [ ]CKDII [ ]CKD III [ ]CKD IV [ ]CKD V [ ]Other pathological lesions [ ]  Abdominal Nutrition Status: malnutrition [ ] cachexia [ ] morbid obesity/BMI=40 [ ] Supplement ordered [___________]     ICU Vital Signs Last 24 Hrs  T(C): 37.2 (12 Mar 2018 07:30), Max: 37.2 (12 Mar 2018 07:30)  T(F): 98.9 (12 Mar 2018 07:30), Max: 98.9 (12 Mar 2018 07:30)  HR: 79 (12 Mar 2018 08:00) (61 - 108)  BP: 101/57 (12 Mar 2018 08:00) (63/33 - 129/102)  BP(mean): 65 (12 Mar 2018 08:00) (39 - 107)  ABP: --  ABP(mean): --  RR: 20 (12 Mar 2018 08:00) (11 - 35)  SpO2: 99% (12 Mar 2018 08:00) (61% - 100%)      ABG - ( 11 Mar 2018 12:52 )  pH: 7.28  /  pCO2: 32    /  pO2: 49    / HCO3: 14    / Base Excess: -11.2 /  SaO2: 78                    03- @ 07:01  -  -12 @ 07:00  --------------------------------------------------------  IN: 4509.6 mL / OUT: 1425 mL / NET: 3084.6 mL            PHYSICAL EXAM:    GENERAL: [ ]NAD, [ ]well-groomed, [ ]well-developed  HEAD:  [ ]Atraumatic, [ ]Normocephalic  EYES: [ ]EOMI, [ ]PERRLA, [ ]conjunctiva and sclera clear  ENMT: [ ]No tonsillar erythema, exudates, or enlargement; [ ]Moist mucous membranes, [ ]Good dentition, [ ]No lesions  NECK: [ ]Supple, normal appearance, [ ]No JVD; [ ]Normal thyroid; [ ]Trachea midline  NERVOUS SYSTEM:  [ ]Alert & Oriented X3, [ ]Good concentration; [ ]Motor Strength 5/5 B/L upper and lower extremities; [ ]DTRs 2+ intact and symmetric  CHEST/LUNG: [ ]No chest deformity; [ ]Normal percussion bilaterally; [ ]No rales, rhonchi, wheezing   HEART: [ ]Regular rate and rhythm; [ ]No murmurs, rubs, or gallops  ABDOMEN: [ ]Soft, Nontender, Nondistended; [ ]Bowel sounds present  EXTREMITIES:  [ ]2+ Peripheral Pulses, [ ]No clubbing, cyanosis, or edema  LYMPH: [ ]No lymphadenopathy noted  SKIN: [ ]No rashes or lesions; [ ]Good capillary refill      LABS:  CBC Full  -  ( 12 Mar 2018 06:44 )  WBC Count : 25.8 K/uL  Hemoglobin : 10.1 g/dL  Hematocrit : 34.3 %  Platelet Count - Automated : 354 K/uL  Mean Cell Volume : 86.0 fl  Mean Cell Hemoglobin : 25.3 pg  Mean Cell Hemoglobin Concentration : 29.4 gm/dL  Auto Neutrophil # : x  Auto Lymphocyte # : x  Auto Monocyte # : x  Auto Eosinophil # : x  Auto Basophil # : x  Auto Neutrophil % : x  Auto Lymphocyte % : x  Auto Monocyte % : x  Auto Eosinophil % : x  Auto Basophil % : x    03-12    161<HH>  |  135<H>  |  93<H>  ----------------------------<  98  4.6   |  13<L>  |  2.13<H>    Ca    7.6<L>      12 Mar 2018 06:44  Phos  2.0     -12  Mg     2.4     -12    TPro  6.5  /  Alb  1.6<L>  /  TBili  0.2  /  DBili  x   /  AST  28  /  ALT  29  /  AlkPhos  69  03-11    PT/INR - ( 11 Mar 2018 18:21 )   PT: 18.1 sec;   INR: 1.64 ratio           Urinalysis Basic - ( 11 Mar 2018 13:40 )    Color: Yellow / Appearance: Turbid / S.010 / pH: x  Gluc: x / Ketone: Negative  / Bili: Small / Urobili: 1   Blood: x / Protein: 100 / Nitrite: Positive   Leuk Esterase: Moderate / RBC: 5-10 /HPF / WBC >50 /HPF   Sq Epi: x / Non Sq Epi: Occasional /HPF / Bacteria: TNTC /HPF          RADIOLOGY & ADDITIONAL STUDIES REVIEWED:  ***    [ ]GOALS OF CARE DISCUSSION WITH PATIENT/FAMILY/PROXY:    CRITICAL CARE TIME SPENT: 35 minutes INTERVAL HPI/OVERNIGHT EVENTS: ***    PRESSORS: [ ] YES [ ] NO  WHICH:    ANTIBIOTICS:                  DATE STARTED:  ANTIBIOTICS:                  DATE STARTED:  ANTIBIOTICS:                  DATE STARTED:    Antimicrobial:  levoFLOXacin IVPB 250 milliGRAM(s) IV Intermittent every 48 hours  meropenem  IVPB 500 milliGRAM(s) IV Intermittent every 12 hours    Cardiovascular:  phenylephrine    Infusion 0.5 MICROgram(s)/kG/Min IV Continuous <Continuous>    Pulmonary:    Hematalogic:  heparin  Injectable 5000 Unit(s) SubCutaneous every 12 hours    Other:  dextrose 5% + sodium chloride 0.45%. 1000 milliLiter(s) IV Continuous <Continuous>  insulin Infusion 4 Unit(s)/Hr IV Continuous <Continuous>  pantoprazole  Injectable 40 milliGRAM(s) IV Push daily    dextrose 5% + sodium chloride 0.45%. 1000 milliLiter(s) IV Continuous <Continuous>  heparin  Injectable 5000 Unit(s) SubCutaneous every 12 hours  insulin Infusion 4 Unit(s)/Hr IV Continuous <Continuous>  levoFLOXacin IVPB 250 milliGRAM(s) IV Intermittent every 48 hours  meropenem  IVPB 500 milliGRAM(s) IV Intermittent every 12 hours  pantoprazole  Injectable 40 milliGRAM(s) IV Push daily  phenylephrine    Infusion 0.5 MICROgram(s)/kG/Min IV Continuous <Continuous>    Drug Dosing Weight  Height (cm): 124.46 (16 May 2017 20:03)  Weight (kg): 40.8 (11 Mar 2018 12:21)  BMI (kg/m2): 26.3 (11 Mar 2018 12:21)  BSA (m2): 1.15 (11 Mar 2018 12:21)    CENTRAL LINE: [x ] YES [ ] NO  LOCATION: [facility]  DATE INSERTED:  REMOVE: [ ] YES [ ] NO  EXPLAIN:    BANKS: [x ] YES [ ] NO    DATE INSERTED:  REMOVE:  [ ] YES [ ] NO  EXPLAIN:    A-LINE:  [ ] YES [x ] NO  LOCATION:   DATE INSERTED:  REMOVE:  [ ] YES [ ] NO  EXPLAIN:    PMH -reviewed admission note, no change since admission  Heart faliure: acute [ ] chronic [ ] acute or chronic [ ] diastolic [ ] systolic [ ] combied systolic and diastolic[ ]  MALIKA: ATN[ ] renal medullary necrosis [ ] CKD I [ ]CKDII [ ]CKD III [ ]CKD IV [ ]CKD V [ ]Other pathological lesions [ ]  Abdominal Nutrition Status: malnutrition [ ] cachexia [ ] morbid obesity/BMI=40 [ ] Supplement ordered [___________]     ICU Vital Signs Last 24 Hrs  T(C): 37.2 (12 Mar 2018 07:30), Max: 37.2 (12 Mar 2018 07:30)  T(F): 98.9 (12 Mar 2018 07:30), Max: 98.9 (12 Mar 2018 07:30)  HR: 79 (12 Mar 2018 08:00) (61 - 108)  BP: 101/57 (12 Mar 2018 08:00) (63/33 - 129/102)  BP(mean): 65 (12 Mar 2018 08:00) (39 - 107)  ABP: --  ABP(mean): --  RR: 20 (12 Mar 2018 08:00) (11 - 35)  SpO2: 99% (12 Mar 2018 08:00) (61% - 100%)      ABG - ( 11 Mar 2018 12:52 )  pH: 7.28  /  pCO2: 32    /  pO2: 49    / HCO3: 14    / Base Excess: -11.2 /  SaO2: 78                    03-11 @ 07:01  -  03-12 @ 07:00  --------------------------------------------------------  IN: 4509.6 mL / OUT: 1425 mL / NET: 3084.6 mL            PHYSICAL EXAM:    GENERAL: [x ]NAD, [x ]well-groomed, [x ]well-developed  HEAD:  [ ]Atraumatic, [ ]Normocephalic  EYES: [ ]EOMI, [ ]PERRLA, [ ]conjunctiva and sclera clear  ENMT: [ ]No tonsillar erythema, exudates, or enlargement; [ ]Moist mucous membranes, [ ]Good dentition, [ ]No lesions  NECK: [ ]Supple, normal appearance, [ ]No JVD; [ ]Normal thyroid; [ ]Trachea midline  NERVOUS SYSTEM:  [x ]Alert & Orientedx0 , unable to access strength as patient is demented   CHEST/LUNG: [x]No chest deformity; [x]Normal percussion bilaterally; [x]No rales, rhonchi, wheezing   HEART: [x]Regular rate and rhythm; [x]No murmurs, rubs, or gallops  ABDOMEN: [x]Soft, Nontender, Nondistended; [x]Bowel sounds present  EXTREMITIES:  [x]2+ Peripheral Pulses, [x]No clubbing, cyanosis, or edema  LYMPH: [x]No lymphadenopathy noted  SKIN: [x]No rashes or lesions; [x]Good capillary refill      LABS:  CBC Full  -  ( 12 Mar 2018 06:44 )  WBC Count : 25.8 K/uL  Hemoglobin : 10.1 g/dL  Hematocrit : 34.3 %  Platelet Count - Automated : 354 K/uL  Mean Cell Volume : 86.0 fl  Mean Cell Hemoglobin : 25.3 pg  Mean Cell Hemoglobin Concentration : 29.4 gm/dL  Auto Neutrophil # : x  Auto Lymphocyte # : x  Auto Monocyte # : x  Auto Eosinophil # : x  Auto Basophil # : x  Auto Neutrophil % : x  Auto Lymphocyte % : x  Auto Monocyte % : x  Auto Eosinophil % : x  Auto Basophil % : x    0312    161<HH>  |  135<H>  |  93<H>  ----------------------------<  98  4.6   |  13<L>  |  2.13<H>    Ca    7.6<L>      12 Mar 2018 06:44  Phos  2.0     12  Mg     2.4     12    TPro  6.5  /  Alb  1.6<L>  /  TBili  0.2  /  DBili  x   /  AST  28  /  ALT  29  /  AlkPhos  69  03-11    PT/INR - ( 11 Mar 2018 18:21 )   PT: 18.1 sec;   INR: 1.64 ratio           Urinalysis Basic - ( 11 Mar 2018 13:40 )    Color: Yellow / Appearance: Turbid / S.010 / pH: x  Gluc: x / Ketone: Negative  / Bili: Small / Urobili: 1   Blood: x / Protein: 100 / Nitrite: Positive   Leuk Esterase: Moderate / RBC: 5-10 /HPF / WBC >50 /HPF   Sq Epi: x / Non Sq Epi: Occasional /HPF / Bacteria: TNTC /HPF          RADIOLOGY & ADDITIONAL STUDIES REVIEWED:  ***    [ ]GOALS OF CARE DISCUSSION WITH PATIENT/FAMILY/PROXY:    CRITICAL CARE TIME SPENT: 35 minutes INTERVAL HPI/OVERNIGHT EVENTS: ***    PRESSORS: [x ] YES [ ] NO  WHICH: pheny    ANTIBIOTICS:    levoflox              DATE STARTED: 3/11  ANTIBIOTICS:     myriam              DATE STARTED: 3/11    Antimicrobial:  levoFLOXacin IVPB 250 milliGRAM(s) IV Intermittent every 48 hours  meropenem  IVPB 500 milliGRAM(s) IV Intermittent every 12 hours    Cardiovascular:  phenylephrine    Infusion 0.5 MICROgram(s)/kG/Min IV Continuous <Continuous>    Pulmonary:    Hematalogic:  heparin  Injectable 5000 Unit(s) SubCutaneous every 12 hours    Other:  dextrose 5% + sodium chloride 0.45%. 1000 milliLiter(s) IV Continuous <Continuous>  insulin Infusion 4 Unit(s)/Hr IV Continuous <Continuous>  pantoprazole  Injectable 40 milliGRAM(s) IV Push daily    dextrose 5% + sodium chloride 0.45%. 1000 milliLiter(s) IV Continuous <Continuous>  heparin  Injectable 5000 Unit(s) SubCutaneous every 12 hours  insulin Infusion 4 Unit(s)/Hr IV Continuous <Continuous>  levoFLOXacin IVPB 250 milliGRAM(s) IV Intermittent every 48 hours  meropenem  IVPB 500 milliGRAM(s) IV Intermittent every 12 hours  pantoprazole  Injectable 40 milliGRAM(s) IV Push daily  phenylephrine    Infusion 0.5 MICROgram(s)/kG/Min IV Continuous <Continuous>    Drug Dosing Weight  Height (cm): 124.46 (16 May 2017 20:03)  Weight (kg): 40.8 (11 Mar 2018 12:21)  BMI (kg/m2): 26.3 (11 Mar 2018 12:21)  BSA (m2): 1.15 (11 Mar 2018 12:21)    CENTRAL LINE: [x ] YES [ ] NO  LOCATION: [facility]  DATE INSERTED:  REMOVE: [ ] YES [ ] NO  EXPLAIN:    BANKS: [x ] YES [ ] NO    DATE INSERTED:  REMOVE:  [ ] YES [ ] NO  EXPLAIN:    A-LINE:  [ ] YES [x ] NO  LOCATION:   DATE INSERTED:  REMOVE:  [ ] YES [ ] NO  EXPLAIN:    PMH -reviewed admission note, no change since admission  Heart faliure: acute [ ] chronic [ ] acute or chronic [ ] diastolic [ ] systolic [ ] combied systolic and diastolic[ ]  MALIKA: ATN[ ] renal medullary necrosis [ ] CKD I [ ]CKDII [ ]CKD III [ ]CKD IV [ ]CKD V [ ]Other pathological lesions [ ]  Abdominal Nutrition Status: malnutrition [ ] cachexia [ ] morbid obesity/BMI=40 [ ] Supplement ordered [___________]     ICU Vital Signs Last 24 Hrs  T(C): 37.2 (12 Mar 2018 07:30), Max: 37.2 (12 Mar 2018 07:30)  T(F): 98.9 (12 Mar 2018 07:30), Max: 98.9 (12 Mar 2018 07:30)  HR: 79 (12 Mar 2018 08:00) (61 - 108)  BP: 101/57 (12 Mar 2018 08:00) (63/33 - 129/102)  BP(mean): 65 (12 Mar 2018 08:00) (39 - 107)  ABP: --  ABP(mean): --  RR: 20 (12 Mar 2018 08:00) (11 - 35)  SpO2: 99% (12 Mar 2018 08:00) (61% - 100%)      ABG - ( 11 Mar 2018 12:52 )  pH: 7.28  /  pCO2: 32    /  pO2: 49    / HCO3: 14    / Base Excess: -11.2 /  SaO2: 78                    03- @ 07:01  -  03-12 @ 07:00  --------------------------------------------------------  IN: 4509.6 mL / OUT: 1425 mL / NET: 3084.6 mL            PHYSICAL EXAM:    GENERAL: [x ]NAD, [x ]well-groomed, [x ]well-developed  HEAD:  [ ]Atraumatic, [ ]Normocephalic  EYES: [ ]EOMI, [ ]PERRLA, [ ]conjunctiva and sclera clear  ENMT: [ ]No tonsillar erythema, exudates, or enlargement; [ ]Moist mucous membranes, [ ]Good dentition, [ ]No lesions  NECK: [ ]Supple, normal appearance, [ ]No JVD; [ ]Normal thyroid; [ ]Trachea midline  NERVOUS SYSTEM:  [x ]Alert & Orientedx0 , unable to access strength as patient is demented   CHEST/LUNG: [x]No chest deformity; [x]Normal percussion bilaterally; [x]No rales, rhonchi, wheezing   HEART: [x]Regular rate and rhythm; [x]No murmurs, rubs, or gallops  ABDOMEN: [x]Soft, Nontender, Nondistended; [x]Bowel sounds present  EXTREMITIES:  [x]2+ Peripheral Pulses, [x]No clubbing, cyanosis, or edema  LYMPH: [x]No lymphadenopathy noted  SKIN: [x]No rashes or lesions; [x]Good capillary refill      LABS:  CBC Full  -  ( 12 Mar 2018 06:44 )  WBC Count : 25.8 K/uL  Hemoglobin : 10.1 g/dL  Hematocrit : 34.3 %  Platelet Count - Automated : 354 K/uL  Mean Cell Volume : 86.0 fl  Mean Cell Hemoglobin : 25.3 pg  Mean Cell Hemoglobin Concentration : 29.4 gm/dL  Auto Neutrophil # : x  Auto Lymphocyte # : x  Auto Monocyte # : x  Auto Eosinophil # : x  Auto Basophil # : x  Auto Neutrophil % : x  Auto Lymphocyte % : x  Auto Monocyte % : x  Auto Eosinophil % : x  Auto Basophil % : x    03-12    161<HH>  |  135<H>  |  93<H>  ----------------------------<  98  4.6   |  13<L>  |  2.13<H>    Ca    7.6<L>      12 Mar 2018 06:44  Phos  2.0     -  Mg     2.4     -12    TPro  6.5  /  Alb  1.6<L>  /  TBili  0.2  /  DBili  x   /  AST  28  /  ALT  29  /  AlkPhos  69  03-11    PT/INR - ( 11 Mar 2018 18:21 )   PT: 18.1 sec;   INR: 1.64 ratio           Urinalysis Basic - ( 11 Mar 2018 13:40 )    Color: Yellow / Appearance: Turbid / S.010 / pH: x  Gluc: x / Ketone: Negative  / Bili: Small / Urobili: 1   Blood: x / Protein: 100 / Nitrite: Positive   Leuk Esterase: Moderate / RBC: 5-10 /HPF / WBC >50 /HPF   Sq Epi: x / Non Sq Epi: Occasional /HPF / Bacteria: TNTC /HPF          RADIOLOGY & ADDITIONAL STUDIES REVIEWED:  ***    [ ]GOALS OF CARE DISCUSSION WITH PATIENT/FAMILY/PROXY:    CRITICAL CARE TIME SPENT: 35 minutes

## 2018-03-12 NOTE — PROGRESS NOTE ADULT - PROBLEM SELECTOR PLAN 5
Secondary to acute renal failure   •Will not attempt to correct as patient on insulin drip  •Will repeat bmp  •Will give calcium gluconate   •Ekg does not show any changes attributable to hyperkalemia resolved

## 2018-03-12 NOTE — ADVANCED PRACTICE NURSE CONSULT - RECOMMEDATIONS
-Clean all wounds with normal saline and apply skin prep to the surrounding skin  -Pack Sacral wound with Dakins Solution soaked gauze and cover with a Foam dressing Daily PRN  -Leave the R. Heel wound open to air  -Elevate/float the patients heels using heel protectors and reposition the patient Q 2hrs using wedges or pillows

## 2018-03-13 LAB
-  AMIKACIN: SIGNIFICANT CHANGE UP
-  AMPICILLIN/SULBACTAM: SIGNIFICANT CHANGE UP
-  AMPICILLIN: SIGNIFICANT CHANGE UP
-  AZTREONAM: SIGNIFICANT CHANGE UP
-  CEFAZOLIN: SIGNIFICANT CHANGE UP
-  CEFEPIME: SIGNIFICANT CHANGE UP
-  CEFOXITIN: SIGNIFICANT CHANGE UP
-  CEFTAZIDIME: SIGNIFICANT CHANGE UP
-  CEFTRIAXONE: SIGNIFICANT CHANGE UP
-  CIPROFLOXACIN: SIGNIFICANT CHANGE UP
-  ERTAPENEM: SIGNIFICANT CHANGE UP
-  GENTAMICIN: SIGNIFICANT CHANGE UP
-  LEVOFLOXACIN: SIGNIFICANT CHANGE UP
-  MEROPENEM: SIGNIFICANT CHANGE UP
-  NITROFURANTOIN: SIGNIFICANT CHANGE UP
-  PIPERACILLIN/TAZOBACTAM: SIGNIFICANT CHANGE UP
-  TOBRAMYCIN: SIGNIFICANT CHANGE UP
-  TRIMETHOPRIM/SULFAMETHOXAZOLE: SIGNIFICANT CHANGE UP
ANION GAP SERPL CALC-SCNC: 12 MMOL/L — SIGNIFICANT CHANGE UP (ref 5–17)
ANION GAP SERPL CALC-SCNC: 8 MMOL/L — SIGNIFICANT CHANGE UP (ref 5–17)
ANION GAP SERPL CALC-SCNC: 9 MMOL/L — SIGNIFICANT CHANGE UP (ref 5–17)
BUN SERPL-MCNC: 34 MG/DL — HIGH (ref 7–18)
BUN SERPL-MCNC: 45 MG/DL — HIGH (ref 7–18)
BUN SERPL-MCNC: 55 MG/DL — HIGH (ref 7–18)
CALCIUM SERPL-MCNC: 7.9 MG/DL — LOW (ref 8.4–10.5)
CALCIUM SERPL-MCNC: 8.3 MG/DL — LOW (ref 8.4–10.5)
CALCIUM SERPL-MCNC: 8.4 MG/DL — SIGNIFICANT CHANGE UP (ref 8.4–10.5)
CHLORIDE SERPL-SCNC: 125 MMOL/L — HIGH (ref 96–108)
CHLORIDE SERPL-SCNC: 127 MMOL/L — HIGH (ref 96–108)
CHLORIDE SERPL-SCNC: 128 MMOL/L — HIGH (ref 96–108)
CO2 SERPL-SCNC: 14 MMOL/L — LOW (ref 22–31)
CO2 SERPL-SCNC: 16 MMOL/L — LOW (ref 22–31)
CO2 SERPL-SCNC: 18 MMOL/L — LOW (ref 22–31)
CREAT SERPL-MCNC: 0.98 MG/DL — SIGNIFICANT CHANGE UP (ref 0.5–1.3)
CREAT SERPL-MCNC: 1.21 MG/DL — SIGNIFICANT CHANGE UP (ref 0.5–1.3)
CREAT SERPL-MCNC: 1.29 MG/DL — SIGNIFICANT CHANGE UP (ref 0.5–1.3)
CRP SERPL-MCNC: 10.7 MG/DL — HIGH (ref 0–0.4)
CULTURE RESULTS: SIGNIFICANT CHANGE UP
ERYTHROCYTE [SEDIMENTATION RATE] IN BLOOD: 65 MM/HR — HIGH (ref 0–20)
ESTIMATED AVERAGE GLUCOSE: 148 MG/DL — HIGH (ref 68–114)
GLUCOSE SERPL-MCNC: 164 MG/DL — HIGH (ref 70–99)
GLUCOSE SERPL-MCNC: 177 MG/DL — HIGH (ref 70–99)
GLUCOSE SERPL-MCNC: 195 MG/DL — HIGH (ref 70–99)
HBA1C BLD-MCNC: 6.8 % — HIGH (ref 4–5.6)
HCT VFR BLD CALC: 36.3 % — SIGNIFICANT CHANGE UP (ref 34.5–45)
HGB BLD-MCNC: 11 G/DL — LOW (ref 11.5–15.5)
MAGNESIUM SERPL-MCNC: 2.2 MG/DL — SIGNIFICANT CHANGE UP (ref 1.6–2.6)
MCHC RBC-ENTMCNC: 25.5 PG — LOW (ref 27–34)
MCHC RBC-ENTMCNC: 30.5 GM/DL — LOW (ref 32–36)
MCV RBC AUTO: 83.8 FL — SIGNIFICANT CHANGE UP (ref 80–100)
METHOD TYPE: SIGNIFICANT CHANGE UP
ORGANISM # SPEC MICROSCOPIC CNT: SIGNIFICANT CHANGE UP
PHOSPHATE SERPL-MCNC: 2.3 MG/DL — LOW (ref 2.5–4.5)
PLATELET # BLD AUTO: 393 K/UL — SIGNIFICANT CHANGE UP (ref 150–400)
POTASSIUM SERPL-MCNC: 3.8 MMOL/L — SIGNIFICANT CHANGE UP (ref 3.5–5.3)
POTASSIUM SERPL-MCNC: 4.5 MMOL/L — SIGNIFICANT CHANGE UP (ref 3.5–5.3)
POTASSIUM SERPL-MCNC: 4.5 MMOL/L — SIGNIFICANT CHANGE UP (ref 3.5–5.3)
POTASSIUM SERPL-SCNC: 3.8 MMOL/L — SIGNIFICANT CHANGE UP (ref 3.5–5.3)
POTASSIUM SERPL-SCNC: 4.5 MMOL/L — SIGNIFICANT CHANGE UP (ref 3.5–5.3)
POTASSIUM SERPL-SCNC: 4.5 MMOL/L — SIGNIFICANT CHANGE UP (ref 3.5–5.3)
RBC # BLD: 4.33 M/UL — SIGNIFICANT CHANGE UP (ref 3.8–5.2)
RBC # FLD: 14 % — SIGNIFICANT CHANGE UP (ref 10.3–14.5)
SODIUM SERPL-SCNC: 150 MMOL/L — HIGH (ref 135–145)
SODIUM SERPL-SCNC: 153 MMOL/L — HIGH (ref 135–145)
SODIUM SERPL-SCNC: 154 MMOL/L — HIGH (ref 135–145)
SPECIMEN SOURCE: SIGNIFICANT CHANGE UP
TSH SERPL-MCNC: 1.82 UU/ML — SIGNIFICANT CHANGE UP (ref 0.34–4.82)
WBC # BLD: 22.7 K/UL — HIGH (ref 3.8–10.5)
WBC # FLD AUTO: 22.7 K/UL — HIGH (ref 3.8–10.5)

## 2018-03-13 PROCEDURE — 99221 1ST HOSP IP/OBS SF/LOW 40: CPT

## 2018-03-13 PROCEDURE — 71045 X-RAY EXAM CHEST 1 VIEW: CPT | Mod: 26

## 2018-03-13 PROCEDURE — 99233 SBSQ HOSP IP/OBS HIGH 50: CPT

## 2018-03-13 PROCEDURE — 99497 ADVNCD CARE PLAN 30 MIN: CPT

## 2018-03-13 RX ORDER — VANCOMYCIN HCL 1 G
750 VIAL (EA) INTRAVENOUS EVERY 12 HOURS
Qty: 0 | Refills: 0 | Status: DISCONTINUED | OUTPATIENT
Start: 2018-03-13 | End: 2018-03-15

## 2018-03-13 RX ORDER — ALBUMIN HUMAN 25 %
25 VIAL (ML) INTRAVENOUS EVERY 6 HOURS
Qty: 0 | Refills: 0 | Status: COMPLETED | OUTPATIENT
Start: 2018-03-13 | End: 2018-03-13

## 2018-03-13 RX ORDER — INSULIN LISPRO 100/ML
VIAL (ML) SUBCUTANEOUS EVERY 6 HOURS
Qty: 0 | Refills: 0 | Status: DISCONTINUED | OUTPATIENT
Start: 2018-03-13 | End: 2018-03-20

## 2018-03-13 RX ORDER — SODIUM CHLORIDE 9 MG/ML
1000 INJECTION, SOLUTION INTRAVENOUS
Qty: 0 | Refills: 0 | Status: DISCONTINUED | OUTPATIENT
Start: 2018-03-12 | End: 2018-03-13

## 2018-03-13 RX ORDER — SODIUM CHLORIDE 9 MG/ML
1000 INJECTION, SOLUTION INTRAVENOUS
Qty: 0 | Refills: 0 | Status: DISCONTINUED | OUTPATIENT
Start: 2018-03-13 | End: 2018-03-14

## 2018-03-13 RX ORDER — POTASSIUM PHOSPHATE, MONOBASIC POTASSIUM PHOSPHATE, DIBASIC 236; 224 MG/ML; MG/ML
15 INJECTION, SOLUTION INTRAVENOUS ONCE
Qty: 0 | Refills: 0 | Status: COMPLETED | OUTPATIENT
Start: 2018-03-13 | End: 2018-03-13

## 2018-03-13 RX ADMIN — CHLORHEXIDINE GLUCONATE 1 APPLICATION(S): 213 SOLUTION TOPICAL at 11:19

## 2018-03-13 RX ADMIN — POTASSIUM PHOSPHATE, MONOBASIC POTASSIUM PHOSPHATE, DIBASIC 62.5 MILLIMOLE(S): 236; 224 INJECTION, SOLUTION INTRAVENOUS at 08:57

## 2018-03-13 RX ADMIN — MEROPENEM 100 MILLIGRAM(S): 1 INJECTION INTRAVENOUS at 17:41

## 2018-03-13 RX ADMIN — Medication 2: at 23:18

## 2018-03-13 RX ADMIN — HEPARIN SODIUM 5000 UNIT(S): 5000 INJECTION INTRAVENOUS; SUBCUTANEOUS at 17:40

## 2018-03-13 RX ADMIN — SODIUM CHLORIDE 40 MILLILITER(S): 9 INJECTION, SOLUTION INTRAVENOUS at 00:40

## 2018-03-13 RX ADMIN — Medication 2: at 17:40

## 2018-03-13 RX ADMIN — INSULIN GLARGINE 6 UNIT(S): 100 INJECTION, SOLUTION SUBCUTANEOUS at 23:18

## 2018-03-13 RX ADMIN — Medication 100 GRAM(S): at 23:58

## 2018-03-13 RX ADMIN — PANTOPRAZOLE SODIUM 40 MILLIGRAM(S): 20 TABLET, DELAYED RELEASE ORAL at 12:41

## 2018-03-13 RX ADMIN — MEROPENEM 100 MILLIGRAM(S): 1 INJECTION INTRAVENOUS at 05:39

## 2018-03-13 RX ADMIN — Medication 19.12 MICROGRAM(S)/KG/MIN: at 22:19

## 2018-03-13 RX ADMIN — Medication 100 GRAM(S): at 11:06

## 2018-03-13 RX ADMIN — Medication 100 GRAM(S): at 14:09

## 2018-03-13 RX ADMIN — SODIUM CHLORIDE 60 MILLILITER(S): 9 INJECTION, SOLUTION INTRAVENOUS at 23:19

## 2018-03-13 RX ADMIN — Medication 250 MILLIGRAM(S): at 22:22

## 2018-03-13 RX ADMIN — Medication 2: at 06:35

## 2018-03-13 RX ADMIN — Medication 19.12 MICROGRAM(S)/KG/MIN: at 08:56

## 2018-03-13 RX ADMIN — HEPARIN SODIUM 5000 UNIT(S): 5000 INJECTION INTRAVENOUS; SUBCUTANEOUS at 05:39

## 2018-03-13 NOTE — PROGRESS NOTE ADULT - PROBLEM SELECTOR PLAN 1
Secondary to UTI and infected sacral ulcer   patient recevied 6 L bolus  • started on meropenem [renally adjusted, allergic to pencillin ] and levoflox to cover for uti and sacral wound . Dr Garcia consulted   - surgery consulted for decubitus ulcer- will need surgery when stable  - lactate normalized   - possible gluteal abcess noted on CT abdomen pelvis  blood culture gram negative rods in anaerobic bottle and urine culture shows proteus - follow final results Secondary to UTI and infected sacral ulcer   patient recevied 6 L bolus  • started on meropenem [renally adjusted, allergic to pencillin] and levoflox to cover for uti and sacral wound . Dr Garcia consulted   - surgery consulted for decubitus ulcer- will need surgery when stable  - lactate normalized   - possible gluteal abcess noted on CT abdomen pelvis  blood culture gram negative rods in anaerobic bottle and urine culture shows proteus - follow final results

## 2018-03-13 NOTE — SWALLOW BEDSIDE ASSESSMENT ADULT - SLP PERTINENT HISTORY OF CURRENT PROBLEM
84 y/o F from NH was sent in for hyperglycemia and failure to thrive. Reportedly, Pt has been getting progressively less interactive in the past month and has been eating very little. Patient is non verbal but arousable. Pt does not following commands.

## 2018-03-13 NOTE — PROGRESS NOTE ADULT - PROBLEM SELECTOR PLAN 7
Palliative consult for possible hospice   •Discussed goals of care includes prognosis & cpr in detail.   •Family not ready to make any desicion at this time . Remains full code patient is DNR/DNI.

## 2018-03-13 NOTE — SWALLOW BEDSIDE ASSESSMENT ADULT - ORAL PHASE
Pt spit out ice chip subsequently spit out trials/Decreased anterior-posterior movement of the bolus

## 2018-03-13 NOTE — PROGRESS NOTE ADULT - SUBJECTIVE AND OBJECTIVE BOX
CHIEF COMPLAINT:Patient is a 83y old  Female who presents with a chief complaint of acute renal failure with hyperkalemia, hyperosmolar hyperglycemic state secondary to diabetes and sepsis (11 Mar 2018 14:55)    	  REVIEW OF SYSTEMS:  CONSTITUTIONAL: No fever, weight loss, or fatigue  EYES: No eye pain, visual disturbances, or discharge  ENMT:  No difficulty hearing, tinnitus, vertigo; No sinus or throat pain  NECK: No pain or stiffness  RESPIRATORY: No cough, wheezing, chills or hemoptysis; No Shortness of Breath  CARDIOVASCULAR: No chest pain, palpitations, passing out, dizziness, or leg swelling  GASTROINTESTINAL: No abdominal or epigastric pain. No nausea, vomiting, or hematemesis; No diarrhea or constipation. No melena or hematochezia.  GENITOURINARY: No dysuria, frequency, hematuria, or incontinence  NEUROLOGICAL: No headaches, memory loss, loss of strength, numbness, or tremors  SKIN: No itching, burning, rashes, or lesions   LYMPH Nodes: No enlarged glands  ENDOCRINE: No heat or cold intolerance; No hair loss  MUSCULOSKELETAL: No joint pain or swelling; No muscle, back, or extremity pain  PSYCHIATRIC: No depression, anxiety, mood swings, or difficulty sleeping  HEME/LYMPH: No easy bruising, or bleeding gums  ALLERY AND IMMUNOLOGIC: No hives or eczema	    [ ] All others negative	  [ ] Unable to obtain    PHYSICAL EXAM:  T(C): 37.5 (03-13-18 @ 20:55), Max: 37.5 (03-13-18 @ 20:55)  HR: 115 (03-13-18 @ 21:00) (93 - 130)  BP: 95/50 (03-13-18 @ 21:00) (65/49 - 131/110)  RR: 18 (03-13-18 @ 21:00) (14 - 34)  SpO2: 100% (03-13-18 @ 19:00) (100% - 100%)  Wt(kg): --  I&O's Summary    12 Mar 2018 07:01  -  13 Mar 2018 07:00  --------------------------------------------------------  IN: 3544.5 mL / OUT: 1760 mL / NET: 1784.5 mL    13 Mar 2018 07:01  -  13 Mar 2018 23:45  --------------------------------------------------------  IN: 1796.5 mL / OUT: 815 mL / NET: 981.5 mL        Appearance: Normal	  HEENT:   Normal oral mucosa, PERRL, EOMI	  Lymphatic: No lymphadenopathy  Cardiovascular: Normal S1 S2, No JVD, No murmurs, No edema  Respiratory: Lungs with bilateral rhonchi	  Psychiatry: A & O x0-1  Gastrointestinal:  Soft, Non-tender, + BS	  Skin: No rashes, No ecchymoses, No cyanosis	  Neurologic: Non-focal  Extremities: Normal range of motion, No clubbing, cyanosis or edema  Vascular: Peripheral pulses palpable 2+ bilaterally    MEDICATIONS  (STANDING):  albumin human 25% IVPB 25 Gram(s) IV Intermittent every 6 hours  chlorhexidine 2% Cloths 1 Application(s) Topical daily  dextrose 5%. 1000 milliLiter(s) (60 mL/Hr) IV Continuous <Continuous>  dextrose 5%. 1000 milliLiter(s) (50 mL/Hr) IV Continuous <Continuous>  dextrose 50% Injectable 12.5 Gram(s) IV Push once  dextrose 50% Injectable 25 Gram(s) IV Push once  dextrose 50% Injectable 25 Gram(s) IV Push once  heparin  Injectable 5000 Unit(s) SubCutaneous every 12 hours  insulin glargine Injectable (LANTUS) 6 Unit(s) SubCutaneous at bedtime  insulin lispro (HumaLOG) corrective regimen sliding scale   SubCutaneous every 6 hours  meropenem  IVPB 500 milliGRAM(s) IV Intermittent every 12 hours  norepinephrine Infusion 0.5 MICROgram(s)/kG/Min (19.125 mL/Hr) IV Continuous <Continuous>  pantoprazole  Injectable 40 milliGRAM(s) IV Push daily  vancomycin  IVPB 750 milliGRAM(s) IV Intermittent every 12 hours      TELEMETRY: 	    ECG:  	  RADIOLOGY:  OTHER: 	  	  CBC Full  -  ( 13 Mar 2018 07:03 )  WBC Count : 22.7 K/uL  Hemoglobin : 11.0 g/dL  Hematocrit : 36.3 %  Platelet Count - Automated : 393 K/uL  Mean Cell Volume : 83.8 fl  Mean Cell Hemoglobin : 25.5 pg  Mean Cell Hemoglobin Concentration : 30.5 gm/dL  Auto Neutrophil # : x  Auto Lymphocyte # : x  Auto Monocyte # : x  Auto Eosinophil # : x  Auto Basophil # : x  Auto Neutrophil % : x  Auto Lymphocyte % : x  Auto Monocyte % : x  Auto Eosinophil % : x  Auto Basophil % : x        CARDIAC MARKERS:                              11.0   22.7  )-----------( 393      ( 13 Mar 2018 07:03 )             36.3       03-13    150<H>  |  125<H>  |  34<H>  ----------------------------<  164<H>  3.8   |  16<L>  |  0.98    Ca    7.9<L>      13 Mar 2018 22:26  Phos  2.3     03-13  Mg     2.2     03-13              proBNP:   Lipid Profile:   HgA1c: Hemoglobin A1C, Whole Blood: 6.8 % (03-13 @ 09:35)    TSH: Thyroid Stimulating Hormone, Serum: 1.82 uU/mL (03-13 @ 07:03)

## 2018-03-13 NOTE — PROGRESS NOTE ADULT - PROBLEM SELECTOR PLAN 2
FAST 7E.  Pt bedbound, nonverbal, dependent on all ADLs. + skin failure; comorbid severe PCM. Overall poor prognosis. Pt eligible for residential hospice - family aware. Pt now DNR / DNI.

## 2018-03-13 NOTE — PROGRESS NOTE ADULT - PROBLEM SELECTOR PLAN 4
improved to 161->151->156->154  on D5 @ 60 improved to 161->151->156->154->153  on D5 @ 60  started on 250 free fluids q6

## 2018-03-13 NOTE — PROGRESS NOTE ADULT - PROBLEM SELECTOR PLAN 5
Spoke with all 5 of pt's children - they identify her dtr, Ruba, as primary decision maker. Spoke to all of them regarding pt's status, educated on dementia disease trajectory; and benefits vs burdens of resuscitation, intubation, artificial nutrition. After extensive discussion, pt was a full code; now dtr agreeable with DNR / DNI. MOLST form completed as per family's wishes. They are aware pt eligible for hospice - will continue to discuss with them. DNR / MOLST forms completed. All questions answered; supportive counseling provided.

## 2018-03-13 NOTE — SWALLOW BEDSIDE ASSESSMENT ADULT - SWALLOW EVAL: DIAGNOSIS
Pt p/w s&s oropharyngeal dysphagia with psychogenic component, characterized primarily by oral defensiveness/poor oral aperture, spitting out food, & absent swallow trigger. Pt is not a candidate for PO feeding at this time.

## 2018-03-13 NOTE — PROGRESS NOTE ADULT - SUBJECTIVE AND OBJECTIVE BOX
OVERNIGHT EVENTS: continues pressor    Present Symptoms:   Review of Systems: Unable to obtain due to poor mentation    MEDICATIONS  (STANDING):  albumin human 25% IVPB 25 Gram(s) IV Intermittent every 6 hours  chlorhexidine 2% Cloths 1 Application(s) Topical daily  dextrose 5%. 1000 milliLiter(s) (60 mL/Hr) IV Continuous <Continuous>  dextrose 5%. 1000 milliLiter(s) (50 mL/Hr) IV Continuous <Continuous>  dextrose 50% Injectable 12.5 Gram(s) IV Push once  dextrose 50% Injectable 25 Gram(s) IV Push once  dextrose 50% Injectable 25 Gram(s) IV Push once  heparin  Injectable 5000 Unit(s) SubCutaneous every 12 hours  insulin glargine Injectable (LANTUS) 6 Unit(s) SubCutaneous at bedtime  insulin lispro (HumaLOG) corrective regimen sliding scale   SubCutaneous every 6 hours  levoFLOXacin IVPB 250 milliGRAM(s) IV Intermittent every 48 hours  meropenem  IVPB 500 milliGRAM(s) IV Intermittent every 12 hours  norepinephrine Infusion 0.5 MICROgram(s)/kG/Min (19.125 mL/Hr) IV Continuous <Continuous>  pantoprazole  Injectable 40 milliGRAM(s) IV Push daily    MEDICATIONS  (PRN):  dextrose Gel 1 Dose(s) Oral once PRN Blood Glucose LESS THAN 70 milliGRAM(s)/deciliter  glucagon  Injectable 1 milliGRAM(s) IntraMuscular once PRN Glucose LESS THAN 70 milligrams/deciliter      PHYSICAL EXAM:  Vital Signs Last 24 Hrs  T(C): 37 (13 Mar 2018 07:30), Max: 37.3 (13 Mar 2018 00:00)  T(F): 98.6 (13 Mar 2018 07:30), Max: 99.1 (13 Mar 2018 00:00)  HR: 113 (13 Mar 2018 10:30) (52 - 125)  BP: 95/45 (13 Mar 2018 10:30) (65/49 - 133/94)  BP(mean): 55 (13 Mar 2018 10:30) (39 - 114)  RR: 23 (13 Mar 2018 10:30) (0 - 34)  SpO2: 100% (13 Mar 2018 10:30) (81% - 100%)  General: lethargic, cachexia, minimal verbalization alert  oriented x ____    [lethargic distressed cachexia  nonverbal  unarousable verbal]  Karnofsky Performance Score/Palliative Performance Status Version2:    %    HEENT: normal  dry mouth  ET tube/trach oral lesions:  Lungs: comfortable tachypnea/labored breathing  excessive secretions  CV: normal  tachycardia  GI: normal  distended  tender  incontinent               PEG/NG/OG tube  constipation  last BM:   : normal  incontinent  oliguria/anuria  pompa  Musculoskeletal: normal  weakness  edema             ambulatory  bedbound/wheelchair bound  Skin: normal  pressure ulcers: stage: edema: other:  Neuro: no deficits cognitive impairment dsyphagia/dysarthria paresis: other:  Oral intake ability: unable/only mouth care [minimal moderate full capability]  Diet: NPO,     LABS:                          11.0   22.7  )-----------( 393      ( 13 Mar 2018 07:03 )             36.3     03-13    154<H>  |  128<H>  |  55<H>  ----------------------------<  177<H>  4.5   |  14<L>  |  1.29    Ca    8.3<L>      13 Mar 2018 07:03  Phos  2.3     03-13  Mg     2.2     03-13    TPro  6.5  /  Alb  1.6<L>  /  TBili  0.2  /  DBili  x   /  AST  28  /  ALT  29  /  AlkPhos  69  03-11    Urinalysis Basic - ( 11 Mar 2018 13:40 )    Color: Yellow / Appearance: Turbid / S.010 / pH: x  Gluc: x / Ketone: Negative  / Bili: Small / Urobili: 1   Blood: x / Protein: 100 / Nitrite: Positive   Leuk Esterase: Moderate / RBC: 5-10 /HPF / WBC >50 /HPF   Sq Epi: x / Non Sq Epi: Occasional /HPF / Bacteria: TNTC /HPF        RADIOLOGY & ADDITIONAL STUDIES:    ADVANCE DIRECTIVES:  Advanced Care Planning discussion total time spent: OVERNIGHT EVENTS: continues pressor    Present Symptoms:   Review of Systems: Unable to obtain due to poor mentation    MEDICATIONS  (STANDING):  albumin human 25% IVPB 25 Gram(s) IV Intermittent every 6 hours  chlorhexidine 2% Cloths 1 Application(s) Topical daily  dextrose 5%. 1000 milliLiter(s) (60 mL/Hr) IV Continuous <Continuous>  dextrose 5%. 1000 milliLiter(s) (50 mL/Hr) IV Continuous <Continuous>  dextrose 50% Injectable 12.5 Gram(s) IV Push once  dextrose 50% Injectable 25 Gram(s) IV Push once  dextrose 50% Injectable 25 Gram(s) IV Push once  heparin  Injectable 5000 Unit(s) SubCutaneous every 12 hours  insulin glargine Injectable (LANTUS) 6 Unit(s) SubCutaneous at bedtime  insulin lispro (HumaLOG) corrective regimen sliding scale   SubCutaneous every 6 hours  levoFLOXacin IVPB 250 milliGRAM(s) IV Intermittent every 48 hours  meropenem  IVPB 500 milliGRAM(s) IV Intermittent every 12 hours  norepinephrine Infusion 0.5 MICROgram(s)/kG/Min (19.125 mL/Hr) IV Continuous <Continuous>  pantoprazole  Injectable 40 milliGRAM(s) IV Push daily    MEDICATIONS  (PRN):  dextrose Gel 1 Dose(s) Oral once PRN Blood Glucose LESS THAN 70 milliGRAM(s)/deciliter  glucagon  Injectable 1 milliGRAM(s) IntraMuscular once PRN Glucose LESS THAN 70 milligrams/deciliter      PHYSICAL EXAM:  Vital Signs Last 24 Hrs  T(C): 37 (13 Mar 2018 07:30), Max: 37.3 (13 Mar 2018 00:00)  T(F): 98.6 (13 Mar 2018 07:30), Max: 99.1 (13 Mar 2018 00:00)  HR: 113 (13 Mar 2018 10:30) (52 - 125)  BP: 95/45 (13 Mar 2018 10:30) (65/49 - 133/94)  BP(mean): 55 (13 Mar 2018 10:30) (39 - 114)  RR: 23 (13 Mar 2018 10:30) (0 - 34)  SpO2: 100% (13 Mar 2018 10:30) (81% - 100%)  General: lethargic, cachexia, minimal verbalization, unable to follow commands  Karnofsky Performance Score/Palliative Performance Status Version2:   20 %    HEENT: dry mouth, + temporal wasting, NGT  Lungs: mild tachypnea  CV:   tachycardia  GI:   incontinent, NGT  :pompa  Musculoskeletal: normal  weakness  edema             ambulatory  bedbound/wheelchair bound  Skin: normal  pressure ulcers: stage: edema: other:  Neuro: no deficits cognitive impairment dsyphagia/dysarthria paresis: other:  Oral intake ability: unable/only mouth care [minimal moderate full capability]  Diet: NPO,     LABS:                          11.0   22.7  )-----------( 393      ( 13 Mar 2018 07:03 )             36.3     03-13    154<H>  |  128<H>  |  55<H>  ----------------------------<  177<H>  4.5   |  14<L>  |  1.29    Ca    8.3<L>      13 Mar 2018 07:03  Phos  2.3     03-13  Mg     2.2     -13    TPro  6.5  /  Alb  1.6<L>  /  TBili  0.2  /  DBili  x   /  AST  28  /  ALT  29  /  AlkPhos  69  03-11    Urinalysis Basic - ( 11 Mar 2018 13:40 )    Color: Yellow / Appearance: Turbid / S.010 / pH: x  Gluc: x / Ketone: Negative  / Bili: Small / Urobili: 1   Blood: x / Protein: 100 / Nitrite: Positive   Leuk Esterase: Moderate / RBC: 5-10 /HPF / WBC >50 /HPF   Sq Epi: x / Non Sq Epi: Occasional /HPF / Bacteria: TNTC /HPF        RADIOLOGY & ADDITIONAL STUDIES:    ADVANCE DIRECTIVES:  Advanced Care Planning discussion total time spent: OVERNIGHT EVENTS: continues pressor    Present Symptoms:   Review of Systems: Unable to obtain due to poor mentation    MEDICATIONS  (STANDING):  albumin human 25% IVPB 25 Gram(s) IV Intermittent every 6 hours  chlorhexidine 2% Cloths 1 Application(s) Topical daily  dextrose 5%. 1000 milliLiter(s) (60 mL/Hr) IV Continuous <Continuous>  dextrose 5%. 1000 milliLiter(s) (50 mL/Hr) IV Continuous <Continuous>  dextrose 50% Injectable 12.5 Gram(s) IV Push once  dextrose 50% Injectable 25 Gram(s) IV Push once  dextrose 50% Injectable 25 Gram(s) IV Push once  heparin  Injectable 5000 Unit(s) SubCutaneous every 12 hours  insulin glargine Injectable (LANTUS) 6 Unit(s) SubCutaneous at bedtime  insulin lispro (HumaLOG) corrective regimen sliding scale   SubCutaneous every 6 hours  levoFLOXacin IVPB 250 milliGRAM(s) IV Intermittent every 48 hours  meropenem  IVPB 500 milliGRAM(s) IV Intermittent every 12 hours  norepinephrine Infusion 0.5 MICROgram(s)/kG/Min (19.125 mL/Hr) IV Continuous <Continuous>  pantoprazole  Injectable 40 milliGRAM(s) IV Push daily    MEDICATIONS  (PRN):  dextrose Gel 1 Dose(s) Oral once PRN Blood Glucose LESS THAN 70 milliGRAM(s)/deciliter  glucagon  Injectable 1 milliGRAM(s) IntraMuscular once PRN Glucose LESS THAN 70 milligrams/deciliter      PHYSICAL EXAM:  Vital Signs Last 24 Hrs  T(C): 37 (13 Mar 2018 07:30), Max: 37.3 (13 Mar 2018 00:00)  T(F): 98.6 (13 Mar 2018 07:30), Max: 99.1 (13 Mar 2018 00:00)  HR: 113 (13 Mar 2018 10:30) (52 - 125)  BP: 95/45 (13 Mar 2018 10:30) (65/49 - 133/94)  BP(mean): 55 (13 Mar 2018 10:30) (39 - 114)  RR: 23 (13 Mar 2018 10:30) (0 - 34)  SpO2: 100% (13 Mar 2018 10:30) (81% - 100%)  General: lethargic, cachexia, minimal verbalization, unable to follow commands  Karnofsky Performance Score/Palliative Performance Status Version2:   20 %    HEENT: dry mouth, + temporal wasting, NGT  Lungs: mild tachypnea  CV:   tachycardia  GI:   incontinent, NGT  : incontinent  Musculoskeletal: bedbound, dependent on ADLs at baseline, loss of muscle mass/subcutaneous fat  Skin: DTI to the R. 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) with bone, pink tissue, necrotic tissue, slough, purulent drainage, odor, and undermining 	  Neuro: lethargic, cachexia, minimal verbalization, not oriented, unable to follow commands, + dysphagia  Diet: NPO - NGT    LABS:                          11.0   22.7  )-----------( 393      ( 13 Mar 2018 07:03 )             36.3     03-13    154<H>  |  128<H>  |  55<H>  ----------------------------<  177<H>  4.5   |  14<L>  |  1.29    Ca    8.3<L>      13 Mar 2018 07:03  Phos  2.3     03-13  Mg     2.2     03-13    TPro  6.5  /  Alb  1.6<L>  /  TBili  0.2  /  DBili  x   /  AST  28  /  ALT  29  /  AlkPhos  69  03-11    Urinalysis Basic - ( 11 Mar 2018 13:40 )    Color: Yellow / Appearance: Turbid / S.010 / pH: x  Gluc: x / Ketone: Negative  / Bili: Small / Urobili: 1   Blood: x / Protein: 100 / Nitrite: Positive   Leuk Esterase: Moderate / RBC: 5-10 /HPF / WBC >50 /HPF   Sq Epi: x / Non Sq Epi: Occasional /HPF / Bacteria: TNTC /HPF        RADIOLOGY & ADDITIONAL STUDIES: reviewed    ADVANCE DIRECTIVES: DNR / MOLST form completed as per family's wishes: DNR / DNI / trial feeding tube / trial IV fluids / use abx. No PEG  Advanced Care Planning discussion total time spent:  +36 minutes; Non face to face. 11:40-11:51AM (11minutes) Spoke with dtr/surrogate, Ruba - discussed advance directives including benefits vs burdens of resuscitation, intubation, artificial nutrition. She wants to further think about it and requests calling her other siblings. 12:11PM-12:18 (7minutes): spoke with pt's daughterMargie - again discussed benefits vs burdens of resuscitation, etc. Dtr reports "I wouldn't want her to suffer anymore." Agreeable with DNR / DNI and hospice. 12:27PM-12:34PM (7 minutes): Spoke with pt's sonJuan - again discussed advance directives. He is agreeable with DNR / DNI. 12:35-12:40 (5minutes): Spoke with pt's dtr/Carmencita - again reviewed advance directives and she echos "I don't want Mom to suffer anymore." 1:40PM-1:47PM (7min) Spoke with pt's sonJuan - again reviewed benefits vs burdens of resuscitation, intubation, artificial nutrition. He reports "My mother has suffered enough." 1:50-1:54PM (4 minutes): Spoke with Ruba landon - aware of conversations with her siblings and their feedback. She is now aggreable with DNR / DNI / trial feeding tube / trial IV fluids / use abx; no PEG. DNR / MOLST forms completed as per family's wishes. See goals of care.

## 2018-03-13 NOTE — PROGRESS NOTE ADULT - SUBJECTIVE AND OBJECTIVE BOX
INTERVAL HPI/OVERNIGHT EVENTS: ***    PRESSORS: [ ] YES [ ] NO  WHICH:    ANTIBIOTICS:                  DATE STARTED:  ANTIBIOTICS:                  DATE STARTED:  ANTIBIOTICS:                  DATE STARTED:    Antimicrobial:  levoFLOXacin IVPB 250 milliGRAM(s) IV Intermittent every 48 hours  meropenem  IVPB 500 milliGRAM(s) IV Intermittent every 12 hours    Cardiovascular:  norepinephrine Infusion 0.5 MICROgram(s)/kG/Min IV Continuous <Continuous>    Pulmonary:    Hematalogic:  heparin  Injectable 5000 Unit(s) SubCutaneous every 12 hours    Other:  chlorhexidine 2% Cloths 1 Application(s) Topical daily  dextrose 5%. 1000 milliLiter(s) IV Continuous <Continuous>  dextrose 5%. 1000 milliLiter(s) IV Continuous <Continuous>  dextrose 50% Injectable 12.5 Gram(s) IV Push once  dextrose 50% Injectable 25 Gram(s) IV Push once  dextrose 50% Injectable 25 Gram(s) IV Push once  dextrose Gel 1 Dose(s) Oral once PRN  glucagon  Injectable 1 milliGRAM(s) IntraMuscular once PRN  insulin glargine Injectable (LANTUS) 6 Unit(s) SubCutaneous at bedtime  insulin lispro (HumaLOG) corrective regimen sliding scale   SubCutaneous every 6 hours  pantoprazole  Injectable 40 milliGRAM(s) IV Push daily    chlorhexidine 2% Cloths 1 Application(s) Topical daily  dextrose 5%. 1000 milliLiter(s) IV Continuous <Continuous>  dextrose 5%. 1000 milliLiter(s) IV Continuous <Continuous>  dextrose 50% Injectable 12.5 Gram(s) IV Push once  dextrose 50% Injectable 25 Gram(s) IV Push once  dextrose 50% Injectable 25 Gram(s) IV Push once  dextrose Gel 1 Dose(s) Oral once PRN  glucagon  Injectable 1 milliGRAM(s) IntraMuscular once PRN  heparin  Injectable 5000 Unit(s) SubCutaneous every 12 hours  insulin glargine Injectable (LANTUS) 6 Unit(s) SubCutaneous at bedtime  insulin lispro (HumaLOG) corrective regimen sliding scale   SubCutaneous every 6 hours  levoFLOXacin IVPB 250 milliGRAM(s) IV Intermittent every 48 hours  meropenem  IVPB 500 milliGRAM(s) IV Intermittent every 12 hours  norepinephrine Infusion 0.5 MICROgram(s)/kG/Min IV Continuous <Continuous>  pantoprazole  Injectable 40 milliGRAM(s) IV Push daily    Drug Dosing Weight  Height (cm): 124.46 (16 May 2017 20:03)  Weight (kg): 40.8 (11 Mar 2018 12:21)  BMI (kg/m2): 26.3 (11 Mar 2018 12:21)  BSA (m2): 1.15 (11 Mar 2018 12:21)    CENTRAL LINE: [ ] YES [ ] NO  LOCATION:   DATE INSERTED:  REMOVE: [ ] YES [ ] NO  EXPLAIN:    BANKS: [ ] YES [ ] NO    DATE INSERTED:  REMOVE:  [ ] YES [ ] NO  EXPLAIN:    A-LINE:  [ ] YES [ ] NO  LOCATION:   DATE INSERTED:  REMOVE:  [ ] YES [ ] NO  EXPLAIN:    PMH -reviewed admission note, no change since admission  Heart faliure: acute [ ] chronic [ ] acute or chronic [ ] diastolic [ ] systolic [ ] combied systolic and diastolic[ ]  MALIKA: ATN[ ] renal medullary necrosis [ ] CKD I [ ]CKDII [ ]CKD III [ ]CKD IV [ ]CKD V [ ]Other pathological lesions [ ]  Abdominal Nutrition Status: malnutrition [ ] cachexia [ ] morbid obesity/BMI=40 [ ] Supplement ordered [___________]     ICU Vital Signs Last 24 Hrs  T(C): 37 (13 Mar 2018 07:30), Max: 37.3 (13 Mar 2018 00:00)  T(F): 98.6 (13 Mar 2018 07:30), Max: 99.1 (13 Mar 2018 00:00)  HR: 114 (13 Mar 2018 07:30) (52 - 120)  BP: 97/71 (13 Mar 2018 07:30) (65/49 - 133/94)  BP(mean): 76 (13 Mar 2018 07:30) (39 - 114)  ABP: --  ABP(mean): --  RR: 23 (13 Mar 2018 07:30) (0 - 30)  SpO2: 100% (13 Mar 2018 07:30) (67% - 100%)      ABG - ( 11 Mar 2018 12:52 )  pH: 7.28  /  pCO2: 32    /  pO2: 49    / HCO3: 14    / Base Excess: -11.2 /  SaO2: 78                     @ 07:01  -  03-13 @ 07:00  --------------------------------------------------------  IN: 3544.5 mL / OUT: 1710 mL / NET: 1834.5 mL            PHYSICAL EXAM:    GENERAL: [x ]NAD, [ x]well-groomed, [x ]well-developed  HEAD:  [x ]Atraumatic, [x ]Normocephalic  EYES: [x ]EOMI, [x ]PERRLA, [x ]conjunctiva and sclera clear  ENMT: [x ]No tonsillar erythema, exudates, or enlargement; [x ]Moist mucous membranes, [ ]Good dentition, [x ]No lesions  NECK: [x ]Supple, normal appearance, [x ]No JVD; [x ]Normal thyroid; [ ]Trachea midline  NERVOUS SYSTEM:  [x ]Alert & Oriented X0, unable to access as patient is not awake   CHEST/LUNG: [x ]No chest deformity; [x ]Normal percussion bilaterally; [x ]No rales, rhonchi, wheezing   HEART: [x ]Regular rate and rhythm; [x ]No murmurs, rubs, or gallops  ABDOMEN: [x ]Soft, Nontender, Nondistended; [ ]Bowel sounds present  EXTREMITIES:  [x ]2+ Peripheral Pulses, [ ]No clubbing, cyanosis, or edema  LYMPH: [x ]No lymphadenopathy noted  SKIN: [x ]No rashes or lesions; [ ]Good capillary refill      LABS:  CBC Full  -  ( 13 Mar 2018 07:03 )  WBC Count : 22.7 K/uL  Hemoglobin : 11.0 g/dL  Hematocrit : 36.3 %  Platelet Count - Automated : 393 K/uL  Mean Cell Volume : 83.8 fl  Mean Cell Hemoglobin : 25.5 pg  Mean Cell Hemoglobin Concentration : 30.5 gm/dL  Auto Neutrophil # : x  Auto Lymphocyte # : x  Auto Monocyte # : x  Auto Eosinophil # : x  Auto Basophil # : x  Auto Neutrophil % : x  Auto Lymphocyte % : x  Auto Monocyte % : x  Auto Eosinophil % : x  Auto Basophil % : x    03-13    154<H>  |  128<H>  |  55<H>  ----------------------------<  177<H>  4.5   |  14<L>  |  1.29    Ca    8.3<L>      13 Mar 2018 07:03  Phos  2.3     03-13  Mg     2.2     03-13    TPro  6.5  /  Alb  1.6<L>  /  TBili  0.2  /  DBili  x   /  AST  28  /  ALT  29  /  AlkPhos  69      PT/INR - ( 11 Mar 2018 18:21 )   PT: 18.1 sec;   INR: 1.64 ratio           Urinalysis Basic - ( 11 Mar 2018 13:40 )    Color: Yellow / Appearance: Turbid / S.010 / pH: x  Gluc: x / Ketone: Negative  / Bili: Small / Urobili: 1   Blood: x / Protein: 100 / Nitrite: Positive   Leuk Esterase: Moderate / RBC: 5-10 /HPF / WBC >50 /HPF   Sq Epi: x / Non Sq Epi: Occasional /HPF / Bacteria: TNTC /HPF      Culture Results:   Growth in anaerobic bottle: Gram Negative Rods ( @ 21:58)  Culture Results:   Growth in anaerobic bottle: Gram Negative Rods  "Due to technical problems, Proteus sp. will Not be reported as part of  the BCID panel until further notice"  ***Blood Panel PCR results on this specimen are available  approximately 3 hours after the Gram stain result.***  Gram stain, PCR, and/or culture results may not always  correspond due to difference in methodologies.  ************************************************************  This PCR assay was performed using [employer].  The following targets are tested for: Enterococcus,  vancomycin resistant enterococci, Listeria monocytogenes,  coagulase negative staphylococci, S. aureus,  methicillin resistant S. aureus, Streptococcus agalactiae  (Group B), S. pneumoniae, S. pyogenes (Group A),  Acinetobacter baumannii, Enterobacter cloacae, E. coli,  Klebsiella oxytoca, K. pneumoniae, Proteus sp.,  Serratia marcescens, Haemophilus influenzae,  Neisseria meningitidis, Pseudomonas aeruginosa, Candida  albicans, C. glabrata, C krusei, C parapsilosis,  C. tropicalis and the KPC resistance gene. ( @ 21:58)  Culture Results:   >100,000 CFU/ml Proteus mirabilis ( @ 21:38)      RADIOLOGY & ADDITIONAL STUDIES REVIEWED:  ***    [x ]GOALS OF CARE DISCUSSION WITH PATIENT/FAMILY/PROXY: full code     CRITICAL CARE TIME SPENT: 35 minutes INTERVAL HPI/OVERNIGHT EVENTS: ***    PRESSORS: [x ] YES [ ] NO  WHICH: levophed    ANTIBIOTICS:  levoflox                DATE STARTED: 3/11  ANTIBIOTICS:  meropenum                DATE STARTED: 3/11    Antimicrobial:  levoFLOXacin IVPB 250 milliGRAM(s) IV Intermittent every 48 hours  meropenem  IVPB 500 milliGRAM(s) IV Intermittent every 12 hours    Cardiovascular:  norepinephrine Infusion 0.5 MICROgram(s)/kG/Min IV Continuous <Continuous>    Pulmonary:    Hematalogic:  heparin  Injectable 5000 Unit(s) SubCutaneous every 12 hours    Other:  chlorhexidine 2% Cloths 1 Application(s) Topical daily  dextrose 5%. 1000 milliLiter(s) IV Continuous <Continuous>  dextrose 5%. 1000 milliLiter(s) IV Continuous <Continuous>  dextrose 50% Injectable 12.5 Gram(s) IV Push once  dextrose 50% Injectable 25 Gram(s) IV Push once  dextrose 50% Injectable 25 Gram(s) IV Push once  dextrose Gel 1 Dose(s) Oral once PRN  glucagon  Injectable 1 milliGRAM(s) IntraMuscular once PRN  insulin glargine Injectable (LANTUS) 6 Unit(s) SubCutaneous at bedtime  insulin lispro (HumaLOG) corrective regimen sliding scale   SubCutaneous every 6 hours  pantoprazole  Injectable 40 milliGRAM(s) IV Push daily    chlorhexidine 2% Cloths 1 Application(s) Topical daily  dextrose 5%. 1000 milliLiter(s) IV Continuous <Continuous>  dextrose 5%. 1000 milliLiter(s) IV Continuous <Continuous>  dextrose 50% Injectable 12.5 Gram(s) IV Push once  dextrose 50% Injectable 25 Gram(s) IV Push once  dextrose 50% Injectable 25 Gram(s) IV Push once  dextrose Gel 1 Dose(s) Oral once PRN  glucagon  Injectable 1 milliGRAM(s) IntraMuscular once PRN  heparin  Injectable 5000 Unit(s) SubCutaneous every 12 hours  insulin glargine Injectable (LANTUS) 6 Unit(s) SubCutaneous at bedtime  insulin lispro (HumaLOG) corrective regimen sliding scale   SubCutaneous every 6 hours  levoFLOXacin IVPB 250 milliGRAM(s) IV Intermittent every 48 hours  meropenem  IVPB 500 milliGRAM(s) IV Intermittent every 12 hours  norepinephrine Infusion 0.5 MICROgram(s)/kG/Min IV Continuous <Continuous>  pantoprazole  Injectable 40 milliGRAM(s) IV Push daily    Drug Dosing Weight  Height (cm): 124.46 (16 May 2017 20:03)  Weight (kg): 40.8 (11 Mar 2018 12:21)  BMI (kg/m2): 26.3 (11 Mar 2018 12:21)  BSA (m2): 1.15 (11 Mar 2018 12:21)    CENTRAL LINE: [x ] YES [ ] NO  LOCATION: [facility]  DATE INSERTED: 3/12  REMOVE: [ ] YES [ ] NO  EXPLAIN:    BANKS: [x ] YES [ ] NO    DATE INSERTED: 3/11  REMOVE:  [ ] YES [ ] NO  EXPLAIN:    A-LINE:  [ ] YES [x ] NO  LOCATION:   DATE INSERTED:  REMOVE:  [ ] YES [ ] NO  EXPLAIN:    PMH -reviewed admission note, no change since admission  Heart faliure: acute [ ] chronic [ ] acute or chronic [ ] diastolic [ ] systolic [ ] combied systolic and diastolic[ ]  MALIKA: ATN[ ] renal medullary necrosis [ ] CKD I [ ]CKDII [ ]CKD III [ ]CKD IV [ ]CKD V [ ]Other pathological lesions [ ]  Abdominal Nutrition Status: malnutrition [ ] cachexia [ ] morbid obesity/BMI=40 [ ] Supplement ordered [___________]     ICU Vital Signs Last 24 Hrs  T(C): 37 (13 Mar 2018 07:30), Max: 37.3 (13 Mar 2018 00:00)  T(F): 98.6 (13 Mar 2018 07:30), Max: 99.1 (13 Mar 2018 00:00)  HR: 114 (13 Mar 2018 07:30) (52 - 120)  BP: 97/71 (13 Mar 2018 07:30) (65/49 - 133/94)  BP(mean): 76 (13 Mar 2018 07:30) (39 - 114)  RR: 23 (13 Mar 2018 07:30) (0 - 30)  SpO2: 100% (13 Mar 2018 07:30) (67% - 100%)      ABG - ( 11 Mar 2018 12:52 )  pH: 7.28  /  pCO2: 32    /  pO2: 49    / HCO3: 14    / Base Excess: -11.2 /  SaO2: 78           @ 07:01  -  03-13 @ 07:00  --------------------------------------------------------  IN: 3544.5 mL / OUT: 1710 mL / NET: 1834.5 mL      PHYSICAL EXAM:    GENERAL: [x ]NAD, [ x]well-groomed, [x ]well-developed  HEAD:  [x ]Atraumatic, [x ]Normocephalic  EYES: [x ]EOMI, [x ]PERRLA, [x ]conjunctiva and sclera clear  ENMT: [x ]No tonsillar erythema, exudates, or enlargement; [x ]Moist mucous membranes, [ ]Good dentition, [x ]No lesions  NECK: [x ]Supple, normal appearance, [x ]No JVD; [x ]Normal thyroid; [ ]Trachea midline  NERVOUS SYSTEM:  [x ]Alert & Oriented X0, unable to access as patient is not awake   CHEST/LUNG: [x ]No chest deformity; [x ]Normal percussion bilaterally; [x ]No rales, rhonchi, wheezing   HEART: [x ]Regular rate and rhythm; [x ]No murmurs, rubs, or gallops  ABDOMEN: [x ]Soft, Nontender, Nondistended; [ ]Bowel sounds present  EXTREMITIES:  [x ]2+ Peripheral Pulses, [ ]No clubbing, cyanosis, or edema  LYMPH: [x ]No lymphadenopathy noted  SKIN: [x ]No rashes or lesions; [ ]Good capillary refill      LABS:  CBC Full  -  ( 13 Mar 2018 07:03 )  WBC Count : 22.7 K/uL  Hemoglobin : 11.0 g/dL  Hematocrit : 36.3 %  Platelet Count - Automated : 393 K/uL  Mean Cell Volume : 83.8 fl  Mean Cell Hemoglobin : 25.5 pg  Mean Cell Hemoglobin Concentration : 30.5 gm/dL  Auto Neutrophil # : x  Auto Lymphocyte # : x  Auto Monocyte # : x  Auto Eosinophil # : x  Auto Basophil # : x  Auto Neutrophil % : x  Auto Lymphocyte % : x  Auto Monocyte % : x  Auto Eosinophil % : x  Auto Basophil % : x    03    154<H>  |  128<H>  |  55<H>  ----------------------------<  177<H>  4.5   |  14<L>  |  1.29    Ca    8.3<L>      13 Mar 2018 07:03  Phos  2.3       Mg     2.2         TPro  6.5  /  Alb  1.6<L>  /  TBili  0.2  /  DBili  x   /  AST  28  /  ALT  29  /  AlkPhos  69      PT/INR - ( 11 Mar 2018 18:21 )   PT: 18.1 sec;   INR: 1.64 ratio           Urinalysis Basic - ( 11 Mar 2018 13:40 )    Color: Yellow / Appearance: Turbid / S.010 / pH: x  Gluc: x / Ketone: Negative  / Bili: Small / Urobili: 1   Blood: x / Protein: 100 / Nitrite: Positive   Leuk Esterase: Moderate / RBC: 5-10 /HPF / WBC >50 /HPF   Sq Epi: x / Non Sq Epi: Occasional /HPF / Bacteria: TNTC /HPF      Culture Results:   Growth in anaerobic bottle: Gram Negative Rods ( @ 21:58)  Culture Results:   Growth in anaerobic bottle: Gram Negative Rods  "Due to technical problems, Proteus sp. will Not be reported as part of  the BCID panel until further notice"  ***Blood Panel PCR results on this specimen are available  approximately 3 hours after the Gram stain result.***  Gram stain, PCR, and/or culture results may not always  correspond due to difference in methodologies.  ************************************************************  This PCR assay was performed using [employer].  The following targets are tested for: Enterococcus,  vancomycin resistant enterococci, Listeria monocytogenes,  coagulase negative staphylococci, S. aureus,  methicillin resistant S. aureus, Streptococcus agalactiae  (Group B), S. pneumoniae, S. pyogenes (Group A),  Acinetobacter baumannii, Enterobacter cloacae, E. coli,  Klebsiella oxytoca, K. pneumoniae, Proteus sp.,  Serratia marcescens, Haemophilus influenzae,  Neisseria meningitidis, Pseudomonas aeruginosa, Candida  albicans, C. glabrata, C krusei, C parapsilosis,  C. tropicalis and the KPC resistance gene. ( @ 21:58)  Culture Results:   >100,000 CFU/ml Proteus mirabilis ( @ 21:38)      RADIOLOGY & ADDITIONAL STUDIES REVIEWED:  ***    [x ]GOALS OF CARE DISCUSSION WITH PATIENT/FAMILY/PROXY: DNR/DNI    CRITICAL CARE TIME SPENT: 35 minutes

## 2018-03-13 NOTE — GOALS OF CARE CONVERSATION - PERSONAL ADVANCE DIRECTIVE - CONVERSATION DETAILS
3/13/18: Spoke with all 5 of pt's children - they identify her dtr, Ruba, as primary decision maker. Spoke to all of them regarding pt's status, educated on dementia disease trajectory; and benefits vs burdens of resuscitation, intubation, artificial nutrition. They are aware pt eligible for hospice.

## 2018-03-13 NOTE — PROGRESS NOTE ADULT - SUBJECTIVE AND OBJECTIVE BOX
Patient is seen and examined at the bed side, is afebrile. She remains on pressor. And she is awake and open eyes to verbal stimuli.  The Leukocytosis is trending down.  The blood cultures grew Clostridium ramosum and Urine culture grew Proteus. The surgical recommendation noted and has defer the debridement of sacral ulcer.          REVIEW OF SYSTEMS: Unable to obtain since patient is nonverbal         ICU Vital Signs Last 24 Hrs  T(C): 37.3 (13 Mar 2018 15:38), Max: 37.3 (13 Mar 2018 00:00)  T(F): 99.1 (13 Mar 2018 15:38), Max: 99.1 (13 Mar 2018 00:00)  HR: 124 (13 Mar 2018 20:00) (85 - 130)  BP: 96/42 (13 Mar 2018 20:00) (65/49 - 131/110)  BP(mean): 50 (13 Mar 2018 20:00) (50 - 114)  ABP: --  ABP(mean): --  RR: 23 (13 Mar 2018 20:00) (14 - 34)  SpO2: 100% (13 Mar 2018 19:00) (100% - 100%)          ALLERGIES: NKDA            PHYSICAL EXAM:  GENERAL: Not in  distress  CVS: s1 and s2 present  RESP: Air entry B/L  GI: Abdomen nondistended and nontender  BACK: Stage IV  decubital ulcer with malodorous drainage  EXT: No pedal edema   CNS: Non verbal          LABS:                                   11.0   22.7  )-----------( 393      ( 13 Mar 2018 07:03 )             36.3                  10.1   25.8  )-----------( 354      ( 12 Mar 2018 06:44 )             34.3                           10.5   20.9  )-----------( 306      ( 11 Mar 2018 18:21 )             34.6           03-13    153<H>  |  127<H>  |  45<H>  ----------------------------<  195<H>  4.5   |  18<L>  |  1.21    Ca    8.4      13 Mar 2018 13:29  Phos  2.3     03-13  Mg     2.2     03-13      03-12    151<H>  |  125<H>  |  68<H>  ----------------------------<  381<H>  4.5   |  15<L>  |  1.61<H>    Ca    7.8<L>      12 Mar 2018 17:38  Phos  2.0       Mg     2.4         TPro  6.5  /  Alb  1.6<L>  /  TBili  0.2  /  DBili  x   /  AST  28  /  ALT  29  /  AlkPhos  69  03-             Urinalysis Basic - ( 11 Mar 2018 13:40 )    Color: Yellow / Appearance: Turbid / S.010 / pH: x  Gluc: x / Ketone: Negative  / Bili: Small / Urobili: 1   Blood: x / Protein: 100 / Nitrite: Positive   Leuk Esterase: Moderate / RBC: 5-10 /HPF / WBC >50 /HPF   Sq Epi: x / Non Sq Epi: Occasional /HPF / Bacteria: TNTC /HPF      CAPILLARY BLOOD GLUCOSE      POCT Blood Glucose.: 100 mg/dL (11 Mar 2018 19:52)  POCT Blood Glucose.: 125 mg/dL (11 Mar 2018 18:56)  POCT Blood Glucose.: 167 mg/dL (11 Mar 2018 18:02)  POCT Blood Glucose.: 222 mg/dL (11 Mar 2018 17:14)  POCT Blood Glucose.: 200 mg/dL (11 Mar 2018 17:12)  POCT Blood Glucose.: 422 mg/dL (11 Mar 2018 15:02)  POCT Blood Glucose.: 467 mg/dL (11 Mar 2018 13:41)  POCT Blood Glucose.: 458 mg/dL (11 Mar 2018 11:42)    ABG - ( 11 Mar 2018 12:52 )  pH: 7.28  /  pCO2: 32    /  pO2: 49    / HCO3: 14    / Base Excess: -11.2 /  SaO2: 78            MEDICATIONS  (STANDING):  albumin human 25% IVPB 25 Gram(s) IV Intermittent every 6 hours  chlorhexidine 2% Cloths 1 Application(s) Topical daily  dextrose 5%. 1000 milliLiter(s) (60 mL/Hr) IV Continuous <Continuous>  dextrose 5%. 1000 milliLiter(s) (50 mL/Hr) IV Continuous <Continuous>  dextrose 50% Injectable 12.5 Gram(s) IV Push once  dextrose 50% Injectable 25 Gram(s) IV Push once  dextrose 50% Injectable 25 Gram(s) IV Push once  heparin  Injectable 5000 Unit(s) SubCutaneous every 12 hours  insulin glargine Injectable (LANTUS) 6 Unit(s) SubCutaneous at bedtime  insulin lispro (HumaLOG) corrective regimen sliding scale   SubCutaneous every 6 hours  levoFLOXacin IVPB 250 milliGRAM(s) IV Intermittent every 48 hours  meropenem  IVPB 500 milliGRAM(s) IV Intermittent every 12 hours  norepinephrine Infusion 0.5 MICROgram(s)/kG/Min (19.125 mL/Hr) IV Continuous <Continuous>  pantoprazole  Injectable 40 milliGRAM(s) IV Push daily    MEDICATIONS  (PRN):  dextrose Gel 1 Dose(s) Oral once PRN Blood Glucose LESS THAN 70 milliGRAM(s)/deciliter  glucagon  Injectable 1 milliGRAM(s) IntraMuscular once PRN Glucose LESS THAN 70 milligrams/deciliter          RADIOLOGY & ADDITIONAL TESTS:    3/11/18 CT Abdomen and Pelvis No Cont (18 @ 15:51) Sacral decubitus ulcer with suspected osteomyelitis and possible intramuscular left gluteal abscesses.      MICROBIOLOGY DATA:    Culture - Blood (18 @ 21:58)    Gram Stain:   Growth in anaerobic bottle: Gram Negative Rods    Specimen Source: .Blood Blood-Peripheral    Culture Results:   Growth in anaerobic bottle: Clostridium ramosum  "Susceptibilities not performed"        Culture - Urine (18 @ 21:38)    -  Amikacin: S <=8    -  Ampicillin: S <=2    -  Ampicillin/Sulbactam: S <=4/2    -  Aztreonam: S <=4    -  Cefazolin: S <=2    -  Cefepime: S <=2    -  Cefoxitin: S <=4    -  Ceftazidime: S <=1    -  Ceftriaxone: S <=1    -  Ciprofloxacin: R >2    -  Ertapenem: S <=0.5    -  Gentamicin: S 2    -  Levofloxacin: R >4    -  Meropenem: S <=1    -  Nitrofurantoin: R 64    -  Piperacillin/Tazobactam: S <=8    -  Tobramycin: S <=2    -  Trimethoprim/Sulfamethoxazole: R >38    Specimen Source: .Urine Catheterized    Culture Results:   >100,000 CFU/ml Proteus mirabilis    Organism Identification: Proteus mirabilis    Organism: Proteus mirabilis    Method Type: RONNA          Culture - Blood (18 @ 21:58)    -  Multidrug (KPC pos) resistant organism: Nondet    -  Staphylococcus aureus: Nondet    -  Methicillin resistant Staphylococcus aureus (MRSA): Nondet    -  Coagulase negative Staphylococcus: Nondet    -  Enterococcus species: Nondet    -  Vancomycin resistant Enterococcus sp.: Nondet    -  Escherichia coli: Nondet    -  Klebsiella oxytoca: Nondet    -  Klebsiella pneumoniae: Nondet    -  Serratia marcescens: Nondet    -  Haemophilus influenzae: Nondet    -  Listeria monocytogenes: Nondet    -  Neisseria meningitidis: Nondet    -  Pseudomonas aeruginosa: Nondet    -  Acinetobacter baumanii: Nondet    -  Enterobacter cloacae complex: Nondet    -  Streptococcus sp. (Not Grp A, B or S pneumoniae): Nondet    -  Streptococcus agalactiae (Group B): Nondet    -  Streptococcus pyogenes (Group A): Nondet    -  Streptococcus pneumoniae: Nondet    -  Candida albicans: Nondet    -  Candida glabrata: Nondet    -  Candida krusei: Nondet  Culture - Blood (18 @ 21:58)    Gram Stain:   Growth in anaerobic bottle: Gram Negative Rods    Specimen Source: .Blood Blood-Peripheral    Culture Results:   Growth in anaerobic bottle: Gram Negative Rods      -  Candida parapsilosis: Nondet    -  Candida tropicalis: Nondet    Gram Stain:   Growth in anaerobic bottle: Gram Negative Rods    Specimen Source: .Blood Blood-Peripheral    Organism: Blood Culture PCR    Culture Results:   Growth in anaerobic bottle: Gram Negative Rods  "Due to technical problems, Proteus sp. will Not be reported as part of  the BCID panel until further notice"  ***Blood Panel PCR results on this specimen are available  approximately 3 hours after the Gram stain result.***  Gram stain, PCR, and/or culture results may not always  correspond due to difference in methodologies.  ************************************************************  This PCR assay was performed using [employer].  The following targets are tested for: Enterococcus,  vancomycin resistant enterococci, Listeria monocytogenes,  coagulase negative staphylococci, S. aureus,  methicillin resistant S. aureus, Streptococcus agalactiae  (Group B), S. pneumoniae, S. pyogenes (Group A),  Acinetobacter baumannii, Enterobacter cloacae, E. coli,  Klebsiella oxytoca, K. pneumoniae, Proteus sp.,  Serratia marcescens, Haemophilus influenzae,  Neisseria meningitidis, Pseudomonas aeruginosa, Candida  albicans, C. glabrata, C krusei, C parapsilosis,  C. tropicalis and the KPC resistance gene.    Organism Identification: Blood Culture PCR    Method Type: PCR    Rapid Respiratory Viral Panel (18 @ 14:03)    Rapid RVP Result: NotDetec: The [employer] RVP Rapid uses polymerase chain reaction (PCR) and melt  curve analysis to screen for adenovirus; coronavirus HKU1, NL63, 229E,  OC43; human metapneumovirus (hMPV); human enterovirus/rhinovirus  (Entero/RV); influenza A; influenza A/H1;influenza A/H3; influenza  A/H1-2009; influenza B; parainfluenza viruses 1, 2, 3, 4; respiratory  syncytial virus; Bordetella pertussis; Mycoplasma pneumoniae; and  Chlamydophila pneumoniae.

## 2018-03-13 NOTE — PROGRESS NOTE ADULT - PROBLEM SELECTOR PLAN 2
secondary to sepsis   discontinued insulin drip and started on lantus 6 units with sliding scale   follow sugars closely

## 2018-03-13 NOTE — SWALLOW BEDSIDE ASSESSMENT ADULT - ORAL PREPARATORY PHASE
Reduced oral grading/Bolus falls into right lateral sulci Refuses to accept bolus into oral cavity/impaired bolus formation/Reduced oral grading

## 2018-03-13 NOTE — PROGRESS NOTE ADULT - ASSESSMENT
patient remains in the ICU. overall prognosis is very poor despite any medical intervention and the family is fully aware. The hospice team spoke with family in detail and patient is DNR and DNI and will likely go to long term care facility with hospice. Will continue current care as per ICU team.

## 2018-03-13 NOTE — PROGRESS NOTE ADULT - PROBLEM SELECTOR PLAN 3
2/2 end stage dementia. Pt with poor PO intake. Pt noted with + temporal wasting, loss of muscle mass. Albumin: 1.6. +NGT. No PEG per family request.

## 2018-03-13 NOTE — PROGRESS NOTE ADULT - PROBLEM SELECTOR PLAN 1
2/2 UTI/sacral ulcer. Pt on pressors; continues IV abx. Not a candidate for surgical debridement at this time. Spoke with pt's siblings extensively today - pt was a full code; now DNR / DNI. Aware pt can benefit from hospice services - will continue to discuss with family.

## 2018-03-13 NOTE — PROGRESS NOTE ADULT - ASSESSMENT
Patient is a 83y old  Female with multiple co-morbidities including  Dementia, bedbound  and DM2, has sent in to the ER from Nursing Home for evaluation of acute renal failure with hyperkalemia, hyperosmolar hyperglycemic state secondary to diabetes and sepsis (11 Mar 2018 14:55). As per daughters at bedside she has been getting progressively less interactive in the past month and has been eating very little.  In the ER, she found to be afebrile but tachycardic and hypotensive to 90/56. The labs significant for positive Urine analysis, Leukocytosis, lactate of 7.3 and  Blood glucose of 584, elevated creatinine  of 5.15. 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 She has admitted to ICU since requiring pressor and started on broad spectrum antibiotics, cultures pending. The ID consult requested to assist with further evaluation and antibiotic  management.    # Septic shock  # GRAM negative bacteremia- Clostridium ramosum  # Infected Decubitus ulcer with Left gluteal  Abscess and Osteomyelitis- No surgical debridement as per Surgery  # UTI - proteus    Would recommend:  1. Discontinue Levaquin  2. Continue  Meropenem to cover Clostridium and Proteus  3. Monitor WBC count, is trending down  4. Follow up Repeat Blood cultures   5. Titrate down pressors as tolerated  6. Wound care evaluation for chemical debridement    d/w ICU team    will follow the patient with you

## 2018-03-13 NOTE — SWALLOW BEDSIDE ASSESSMENT ADULT - ADDITIONAL RECOMMENDATIONS
Rg Free Water Protocol: Patient may have ice chips any time ONLY after oral care is completed. Caregivers should take cues from Pt to note if ice chips are desired--- Do NOT force feed pt.

## 2018-03-14 LAB
ANION GAP SERPL CALC-SCNC: 11 MMOL/L — SIGNIFICANT CHANGE UP (ref 5–17)
ANION GAP SERPL CALC-SCNC: 9 MMOL/L — SIGNIFICANT CHANGE UP (ref 5–17)
BUN SERPL-MCNC: 25 MG/DL — HIGH (ref 7–18)
BUN SERPL-MCNC: 30 MG/DL — HIGH (ref 7–18)
CALCIUM SERPL-MCNC: 7.8 MG/DL — LOW (ref 8.4–10.5)
CALCIUM SERPL-MCNC: 7.9 MG/DL — LOW (ref 8.4–10.5)
CHLORIDE SERPL-SCNC: 116 MMOL/L — HIGH (ref 96–108)
CHLORIDE SERPL-SCNC: 120 MMOL/L — HIGH (ref 96–108)
CO2 SERPL-SCNC: 16 MMOL/L — LOW (ref 22–31)
CO2 SERPL-SCNC: 18 MMOL/L — LOW (ref 22–31)
CREAT SERPL-MCNC: 0.86 MG/DL — SIGNIFICANT CHANGE UP (ref 0.5–1.3)
CREAT SERPL-MCNC: 0.97 MG/DL — SIGNIFICANT CHANGE UP (ref 0.5–1.3)
GLUCOSE SERPL-MCNC: 202 MG/DL — HIGH (ref 70–99)
GLUCOSE SERPL-MCNC: 239 MG/DL — HIGH (ref 70–99)
HCT VFR BLD CALC: 32.9 % — LOW (ref 34.5–45)
HGB BLD-MCNC: 10 G/DL — LOW (ref 11.5–15.5)
MAGNESIUM SERPL-MCNC: 1.8 MG/DL — SIGNIFICANT CHANGE UP (ref 1.6–2.6)
MCHC RBC-ENTMCNC: 25.1 PG — LOW (ref 27–34)
MCHC RBC-ENTMCNC: 30.4 GM/DL — LOW (ref 32–36)
MCV RBC AUTO: 82.7 FL — SIGNIFICANT CHANGE UP (ref 80–100)
PHOSPHATE SERPL-MCNC: 2.2 MG/DL — LOW (ref 2.5–4.5)
PLATELET # BLD AUTO: 296 K/UL — SIGNIFICANT CHANGE UP (ref 150–400)
POTASSIUM SERPL-MCNC: 3.9 MMOL/L — SIGNIFICANT CHANGE UP (ref 3.5–5.3)
POTASSIUM SERPL-MCNC: 4.6 MMOL/L — SIGNIFICANT CHANGE UP (ref 3.5–5.3)
POTASSIUM SERPL-SCNC: 3.9 MMOL/L — SIGNIFICANT CHANGE UP (ref 3.5–5.3)
POTASSIUM SERPL-SCNC: 4.6 MMOL/L — SIGNIFICANT CHANGE UP (ref 3.5–5.3)
RBC # BLD: 3.98 M/UL — SIGNIFICANT CHANGE UP (ref 3.8–5.2)
RBC # FLD: 13.7 % — SIGNIFICANT CHANGE UP (ref 10.3–14.5)
SODIUM SERPL-SCNC: 143 MMOL/L — SIGNIFICANT CHANGE UP (ref 135–145)
SODIUM SERPL-SCNC: 147 MMOL/L — HIGH (ref 135–145)
VANCOMYCIN TROUGH SERPL-MCNC: 16.8 UG/ML — SIGNIFICANT CHANGE UP (ref 10–20)
WBC # BLD: 17.7 K/UL — HIGH (ref 3.8–10.5)
WBC # FLD AUTO: 17.7 K/UL — HIGH (ref 3.8–10.5)

## 2018-03-14 RX ORDER — VASOPRESSIN 20 [USP'U]/ML
0.04 INJECTION INTRAVENOUS
Qty: 100 | Refills: 0 | Status: DISCONTINUED | OUTPATIENT
Start: 2018-03-14 | End: 2018-03-16

## 2018-03-14 RX ORDER — POTASSIUM PHOSPHATE, MONOBASIC POTASSIUM PHOSPHATE, DIBASIC 236; 224 MG/ML; MG/ML
15 INJECTION, SOLUTION INTRAVENOUS ONCE
Qty: 0 | Refills: 0 | Status: COMPLETED | OUTPATIENT
Start: 2018-03-14 | End: 2018-03-14

## 2018-03-14 RX ORDER — MORPHINE SULFATE 50 MG/1
2 CAPSULE, EXTENDED RELEASE ORAL EVERY 6 HOURS
Qty: 0 | Refills: 0 | Status: DISCONTINUED | OUTPATIENT
Start: 2018-03-14 | End: 2018-03-15

## 2018-03-14 RX ORDER — SODIUM CHLORIDE 9 MG/ML
1000 INJECTION, SOLUTION INTRAVENOUS ONCE
Qty: 0 | Refills: 0 | Status: COMPLETED | OUTPATIENT
Start: 2018-03-14 | End: 2018-03-14

## 2018-03-14 RX ORDER — POTASSIUM PHOSPHATE, MONOBASIC POTASSIUM PHOSPHATE, DIBASIC 236; 224 MG/ML; MG/ML
15 INJECTION, SOLUTION INTRAVENOUS ONCE
Qty: 0 | Refills: 0 | Status: DISCONTINUED | OUTPATIENT
Start: 2018-03-14 | End: 2018-03-14

## 2018-03-14 RX ORDER — MIDODRINE HYDROCHLORIDE 2.5 MG/1
5 TABLET ORAL EVERY 8 HOURS
Qty: 0 | Refills: 0 | Status: DISCONTINUED | OUTPATIENT
Start: 2018-03-14 | End: 2018-03-15

## 2018-03-14 RX ADMIN — MORPHINE SULFATE 2 MILLIGRAM(S): 50 CAPSULE, EXTENDED RELEASE ORAL at 10:29

## 2018-03-14 RX ADMIN — Medication 3: at 05:43

## 2018-03-14 RX ADMIN — MIDODRINE HYDROCHLORIDE 5 MILLIGRAM(S): 2.5 TABLET ORAL at 13:01

## 2018-03-14 RX ADMIN — PANTOPRAZOLE SODIUM 40 MILLIGRAM(S): 20 TABLET, DELAYED RELEASE ORAL at 11:29

## 2018-03-14 RX ADMIN — HEPARIN SODIUM 5000 UNIT(S): 5000 INJECTION INTRAVENOUS; SUBCUTANEOUS at 05:13

## 2018-03-14 RX ADMIN — MIDODRINE HYDROCHLORIDE 5 MILLIGRAM(S): 2.5 TABLET ORAL at 21:19

## 2018-03-14 RX ADMIN — Medication 250 MILLIGRAM(S): at 08:29

## 2018-03-14 RX ADMIN — MEROPENEM 100 MILLIGRAM(S): 1 INJECTION INTRAVENOUS at 17:01

## 2018-03-14 RX ADMIN — MEROPENEM 100 MILLIGRAM(S): 1 INJECTION INTRAVENOUS at 05:13

## 2018-03-14 RX ADMIN — Medication 2: at 17:11

## 2018-03-14 RX ADMIN — MORPHINE SULFATE 2 MILLIGRAM(S): 50 CAPSULE, EXTENDED RELEASE ORAL at 16:22

## 2018-03-14 RX ADMIN — POTASSIUM PHOSPHATE, MONOBASIC POTASSIUM PHOSPHATE, DIBASIC 62.5 MILLIMOLE(S): 236; 224 INJECTION, SOLUTION INTRAVENOUS at 08:36

## 2018-03-14 RX ADMIN — VASOPRESSIN 2.4 UNIT(S)/MIN: 20 INJECTION INTRAVENOUS at 16:46

## 2018-03-14 RX ADMIN — MORPHINE SULFATE 2 MILLIGRAM(S): 50 CAPSULE, EXTENDED RELEASE ORAL at 16:52

## 2018-03-14 RX ADMIN — Medication 250 MILLIGRAM(S): at 17:02

## 2018-03-14 RX ADMIN — CHLORHEXIDINE GLUCONATE 1 APPLICATION(S): 213 SOLUTION TOPICAL at 11:30

## 2018-03-14 RX ADMIN — Medication 19.12 MICROGRAM(S)/KG/MIN: at 11:49

## 2018-03-14 RX ADMIN — MORPHINE SULFATE 2 MILLIGRAM(S): 50 CAPSULE, EXTENDED RELEASE ORAL at 09:59

## 2018-03-14 RX ADMIN — INSULIN GLARGINE 6 UNIT(S): 100 INJECTION, SOLUTION SUBCUTANEOUS at 21:19

## 2018-03-14 RX ADMIN — Medication 100 MILLIGRAM(S): at 21:25

## 2018-03-14 RX ADMIN — HEPARIN SODIUM 5000 UNIT(S): 5000 INJECTION INTRAVENOUS; SUBCUTANEOUS at 17:01

## 2018-03-14 RX ADMIN — SODIUM CHLORIDE 4000 MILLILITER(S): 9 INJECTION, SOLUTION INTRAVENOUS at 16:41

## 2018-03-14 NOTE — PROGRESS NOTE ADULT - SUBJECTIVE AND OBJECTIVE BOX
CHIEF COMPLAINT:Patient is a 83y old  Female who presents with a chief complaint of acute renal failure with hyperkalemia, hyperosmolar hyperglycemic state secondary to diabetes and sepsis (11 Mar 2018 14:55)    	  REVIEW OF SYSTEMS:  CONSTITUTIONAL: No fever, weight loss, or fatigue  EYES: No eye pain, visual disturbances, or discharge  ENMT:  No difficulty hearing, tinnitus, vertigo; No sinus or throat pain  NECK: No pain or stiffness  RESPIRATORY: No cough, wheezing, chills or hemoptysis; No Shortness of Breath  CARDIOVASCULAR: No chest pain, palpitations, passing out, dizziness, or leg swelling  GASTROINTESTINAL: No abdominal or epigastric pain. No nausea, vomiting, or hematemesis; No diarrhea or constipation. No melena or hematochezia.  GENITOURINARY: No dysuria, frequency, hematuria, or incontinence  NEUROLOGICAL: No headaches, memory loss, loss of strength, numbness, or tremors  SKIN: No itching, burning, rashes, or lesions   LYMPH Nodes: No enlarged glands  ENDOCRINE: No heat or cold intolerance; No hair loss  MUSCULOSKELETAL: No joint pain or swelling; No muscle, back, or extremity pain  PSYCHIATRIC: No depression, anxiety, mood swings, or difficulty sleeping  HEME/LYMPH: No easy bruising, or bleeding gums  ALLERY AND IMMUNOLOGIC: No hives or eczema	    [ ] All others negative	  [ ] Unable to obtain    PHYSICAL EXAM:  T(C): 37.6 (03-14-18 @ 19:42), Max: 37.6 (03-14-18 @ 19:42)  HR: 105 (03-14-18 @ 22:30) (95 - 143)  BP: 100/71 (03-14-18 @ 22:30) (66/53 - 150/60)  RR: 11 (03-14-18 @ 22:30) (0 - 35)  SpO2: 100% (03-14-18 @ 17:00) (100% - 100%)  Wt(kg): --  I&O's Summary    13 Mar 2018 07:01  -  14 Mar 2018 07:00  --------------------------------------------------------  IN: 3499.4 mL / OUT: 1225 mL / NET: 2274.4 mL    14 Mar 2018 07:01  -  14 Mar 2018 23:29  --------------------------------------------------------  IN: 3151.9 mL / OUT: 930 mL / NET: 2221.9 mL        Appearance: Normal	  HEENT:   Normal oral mucosa, PERRL, EOMI	  Lymphatic: No lymphadenopathy  Cardiovascular: Normal S1 S2, No JVD, No murmurs, No edema  Respiratory: Lungs clear to auscultation	  Psychiatry: A & O x 3, Mood & affect appropriate  Gastrointestinal:  Soft, Non-tender, + BS	  Skin: No rashes, No ecchymoses, No cyanosis	  Neurologic: Non-focal  Extremities: Normal range of motion, No clubbing, cyanosis or edema  Vascular: Peripheral pulses palpable 2+ bilaterally    MEDICATIONS  (STANDING):  chlorhexidine 2% Cloths 1 Application(s) Topical daily  clindamycin IVPB      dextrose 50% Injectable 12.5 Gram(s) IV Push once  dextrose 50% Injectable 25 Gram(s) IV Push once  dextrose 50% Injectable 25 Gram(s) IV Push once  heparin  Injectable 5000 Unit(s) SubCutaneous every 12 hours  insulin glargine Injectable (LANTUS) 6 Unit(s) SubCutaneous at bedtime  insulin lispro (HumaLOG) corrective regimen sliding scale   SubCutaneous every 6 hours  meropenem  IVPB 500 milliGRAM(s) IV Intermittent every 12 hours  midodrine 5 milliGRAM(s) Oral every 8 hours  norepinephrine Infusion 0.5 MICROgram(s)/kG/Min (19.125 mL/Hr) IV Continuous <Continuous>  pantoprazole  Injectable 40 milliGRAM(s) IV Push daily  vancomycin  IVPB 750 milliGRAM(s) IV Intermittent every 12 hours  vasopressin Infusion 0.04 Unit(s)/Min (2.4 mL/Hr) IV Continuous <Continuous>      TELEMETRY: 	    ECG:  	  RADIOLOGY:  OTHER: 	  	  CBC Full  -  ( 14 Mar 2018 06:25 )  WBC Count : 17.7 K/uL  Hemoglobin : 10.0 g/dL  Hematocrit : 32.9 %  Platelet Count - Automated : 296 K/uL  Mean Cell Volume : 82.7 fl  Mean Cell Hemoglobin : 25.1 pg  Mean Cell Hemoglobin Concentration : 30.4 gm/dL  Auto Neutrophil # : x  Auto Lymphocyte # : x  Auto Monocyte # : x  Auto Eosinophil # : x  Auto Basophil # : x  Auto Neutrophil % : x  Auto Lymphocyte % : x  Auto Monocyte % : x  Auto Eosinophil % : x  Auto Basophil % : x        CARDIAC MARKERS:                              10.0   17.7  )-----------( 296      ( 14 Mar 2018 06:25 )             32.9       03-14    143  |  116<H>  |  25<H>  ----------------------------<  239<H>  4.6   |  18<L>  |  0.86    Ca    7.8<L>      14 Mar 2018 15:45  Phos  2.2     03-14  Mg     1.8     03-14              proBNP:   Lipid Profile:   HgA1c: Hemoglobin A1C, Whole Blood: 6.8 % (03-13 @ 09:35)    TSH: Thyroid Stimulating Hormone, Serum: 1.82 uU/mL (03-13 @ 07:03)

## 2018-03-14 NOTE — CHART NOTE - NSCHARTNOTEFT_GEN_A_CORE
Surgery consulted again for evaluation of sacral decubitus and concerns for gluteal abscess.  Patient examined. Stage 4 sacral decubitus ulcer 8x7cm, jagged edges, Wound base pink with necrotic slough throughout Extensive undermining at left, right, and inferior aspect of wound, from 5-8cm. No drainage expressed.    No concerns for abscess collection.  No need for debridement at this time.   Continue daily wound care with collagenase, nutrition support, pressure offloading  Case and plan discussed with Dr. Rahman and agrees

## 2018-03-14 NOTE — PROGRESS NOTE ADULT - PROBLEM SELECTOR PLAN 1
Secondary to UTI and infected sacral ulcer   patient recevied 6 L bolus  • started on meropenem [renally adjusted, allergic to pencillin] and levoflox to cover for uti and sacral wound . Dr Garcia consulted   - surgery consulted for decubitus ulcer- will need surgery when stable  - lactate normalized   - possible gluteal abcess noted on CT abdomen pelvis  blood culture gram negative rods in anaerobic bottle and urine culture shows proteus - follow final results Secondary to UTI and infected sacral ulcer   patient recevied 6 L bolus  • continue with  meropenem [renally adjusted, allergic to pencillin] and started on vanco for clostridium ramosum  - follow up with surgery  - possible gluteal abcess noted on CT abdomen pelvis  blood culture gram negative rods in anaerobic bottle- clostridium ramosum and urine culture shows proteus - follow final results

## 2018-03-14 NOTE — PROGRESS NOTE ADULT - SUBJECTIVE AND OBJECTIVE BOX
Patient is seen and examined at the bed side, is afebrile. She is on two pressors now. The Leukocytosis continues trending down.          REVIEW OF SYSTEMS: Unable to obtain since patient is nonverbal         ICU Vital Signs Last 24 Hrs  T(C): 37.6 (14 Mar 2018 19:42), Max: 37.6 (14 Mar 2018 19:42)  T(F): 99.6 (14 Mar 2018 19:42), Max: 99.6 (14 Mar 2018 19:42)  HR: 105 (14 Mar 2018 20:30) (105 - 143)  BP: 70/52 (14 Mar 2018 20:30) (66/53 - 150/60)  BP(mean): 60 (14 Mar 2018 20:30) (48 - 106)  ABP: --  ABP(mean): --  RR: 14 (14 Mar 2018 20:30) (0 - 35)  SpO2: 100% (14 Mar 2018 17:00) (100% - 100%)          ALLERGIES: NKDA          PHYSICAL EXAM:  GENERAL: Not in  distress  CVS: s1 and s2 present  RESP: Air entry B/L  GI: Abdomen nondistended and nontender  BACK: Stage IV  decubital ulcer with malodorous drainage  EXT: No pedal edema   CNS: Non verbal          LABS:                        10.0   17.7  )-----------( 296      ( 14 Mar 2018 06:25 )             32.9                                      11.0   22.7  )-----------( 393      ( 13 Mar 2018 07:03 )             36.3                  10.1   25.8  )-----------( 354      ( 12 Mar 2018 06:44 )             34.3                        143  |  116<H>  |  25<H>  ----------------------------<  239<H>  4.6   |  18<L>  |  0.86    Ca    7.8<L>      14 Mar 2018 15:45  Phos  2.2     03-14  Mg     1.8           03-13    153<H>  |  127<H>  |  45<H>  ----------------------------<  195<H>  4.5   |  18<L>  |  1.21    Ca    8.4      13 Mar 2018 13:29  Phos  2.3     03-13  Mg     2.2     03-13      TPro  6.5  /  Alb  1.6<L>  /  TBili  0.2  /  DBili  x   /  AST  28  /  ALT  29  /  AlkPhos  69  03-11             Urinalysis Basic - ( 11 Mar 2018 13:40 )    Color: Yellow / Appearance: Turbid / S.010 / pH: x  Gluc: x / Ketone: Negative  / Bili: Small / Urobili: 1   Blood: x / Protein: 100 / Nitrite: Positive   Leuk Esterase: Moderate / RBC: 5-10 /HPF / WBC >50 /HPF   Sq Epi: x / Non Sq Epi: Occasional /HPF / Bacteria: TNTC /HPF      CAPILLARY BLOOD GLUCOSE      POCT Blood Glucose.: 100 mg/dL (11 Mar 2018 19:52)  POCT Blood Glucose.: 125 mg/dL (11 Mar 2018 18:56)  POCT Blood Glucose.: 167 mg/dL (11 Mar 2018 18:02)  POCT Blood Glucose.: 222 mg/dL (11 Mar 2018 17:14)  POCT Blood Glucose.: 200 mg/dL (11 Mar 2018 17:12)  POCT Blood Glucose.: 422 mg/dL (11 Mar 2018 15:02)  POCT Blood Glucose.: 467 mg/dL (11 Mar 2018 13:41)  POCT Blood Glucose.: 458 mg/dL (11 Mar 2018 11:42)    ABG - ( 11 Mar 2018 12:52 )  pH: 7.28  /  pCO2: 32    /  pO2: 49    / HCO3: 14    / Base Excess: -11.2 /  SaO2: 78              MEDICATIONS  (STANDING):  chlorhexidine 2% Cloths 1 Application(s) Topical daily  dextrose 50% Injectable 12.5 Gram(s) IV Push once  dextrose 50% Injectable 25 Gram(s) IV Push once  dextrose 50% Injectable 25 Gram(s) IV Push once  heparin  Injectable 5000 Unit(s) SubCutaneous every 12 hours  insulin glargine Injectable (LANTUS) 6 Unit(s) SubCutaneous at bedtime  insulin lispro (HumaLOG) corrective regimen sliding scale   SubCutaneous every 6 hours  meropenem  IVPB 500 milliGRAM(s) IV Intermittent every 12 hours  midodrine 5 milliGRAM(s) Oral every 8 hours  norepinephrine Infusion 0.5 MICROgram(s)/kG/Min (19.125 mL/Hr) IV Continuous <Continuous>  pantoprazole  Injectable 40 milliGRAM(s) IV Push daily  vancomycin  IVPB 750 milliGRAM(s) IV Intermittent every 12 hours  vasopressin Infusion 0.04 Unit(s)/Min (2.4 mL/Hr) IV Continuous <Continuous>    MEDICATIONS  (PRN):  dextrose Gel 1 Dose(s) Oral once PRN Blood Glucose LESS THAN 70 milliGRAM(s)/deciliter  glucagon  Injectable 1 milliGRAM(s) IntraMuscular once PRN Glucose LESS THAN 70 milligrams/deciliter  morphine  - Injectable 2 milliGRAM(s) IV Push every 6 hours PRN tachycardia HR > 110            RADIOLOGY & ADDITIONAL TESTS:    3/11/18 CT Abdomen and Pelvis No Cont (18 @ 15:51) Sacral decubitus ulcer with suspected osteomyelitis and possible intramuscular left gluteal abscesses.      MICROBIOLOGY DATA:    Culture - Blood (18 @ 21:58)    Gram Stain:   Growth in anaerobic bottle: Gram Negative Rods    Specimen Source: .Blood Blood-Peripheral    Culture Results:   Growth in anaerobic bottle: Clostridium ramosum  "Susceptibilities not performed"        Culture - Urine (18 @ 21:38)    -  Amikacin: S <=8    -  Ampicillin: S <=2    -  Ampicillin/Sulbactam: S <=4/2    -  Aztreonam: S <=4    -  Cefazolin: S <=2    -  Cefepime: S <=2    -  Cefoxitin: S <=4    -  Ceftazidime: S <=1    -  Ceftriaxone: S <=1    -  Ciprofloxacin: R >2    -  Ertapenem: S <=0.5    -  Gentamicin: S 2    -  Levofloxacin: R >4    -  Meropenem: S <=1    -  Nitrofurantoin: R 64    -  Piperacillin/Tazobactam: S <=8    -  Tobramycin: S <=2    -  Trimethoprim/Sulfamethoxazole: R >/38    Specimen Source: .Urine Catheterized    Culture Results:   >100,000 CFU/ml Proteus mirabilis    Organism Identification: Proteus mirabilis    Organism: Proteus mirabilis    Method Type: RONNA          Culture - Blood (18 @ 21:58)    -  Multidrug (KPC pos) resistant organism: Nondet    -  Staphylococcus aureus: Nondet    -  Methicillin resistant Staphylococcus aureus (MRSA): Nondet    -  Coagulase negative Staphylococcus: Nondet    -  Enterococcus species: Nondet    -  Vancomycin resistant Enterococcus sp.: Nondet    -  Escherichia coli: Nondet    -  Klebsiella oxytoca: Nondet    -  Klebsiella pneumoniae: Nondet    -  Serratia marcescens: Nondet    -  Haemophilus influenzae: Nondet    -  Listeria monocytogenes: Nondet    -  Neisseria meningitidis: Nondet    -  Pseudomonas aeruginosa: Nondet    -  Acinetobacter baumanii: Nondet    -  Enterobacter cloacae complex: Nondet    -  Streptococcus sp. (Not Grp A, B or S pneumoniae): Nondet    -  Streptococcus agalactiae (Group B): Nondet    -  Streptococcus pyogenes (Group A): Nondet    -  Streptococcus pneumoniae: Nondet    -  Candida albicans: Nondet    -  Candida glabrata: Nondet    -  Candida krusei: Nondet  Culture - Blood (18 @ 21:58)    Gram Stain:   Growth in anaerobic bottle: Gram Negative Rods    Specimen Source: .Blood Blood-Peripheral    Culture Results:   Growth in anaerobic bottle: Gram Negative Rods      -  Candida parapsilosis: Nondet    -  Candida tropicalis: Nondet    Gram Stain:   Growth in anaerobic bottle: Gram Negative Rods    Specimen Source: .Blood Blood-Peripheral    Organism: Blood Culture PCR    Culture Results:   Growth in anaerobic bottle: Gram Negative Rods  "Due to technical problems, Proteus sp. will Not be reported as part of  the BCID panel until further notice"  ***Blood Panel PCR results on this specimen are available  approximately 3 hours after the Gram stain result.***  Gram stain, PCR, and/or culture results may not always  correspond due to difference in methodologies.  ************************************************************  This PCR assay was performed using [employer].  The following targets are tested for: Enterococcus,  vancomycin resistant enterococci, Listeria monocytogenes,  coagulase negative staphylococci, S. aureus,  methicillin resistant S. aureus, Streptococcus agalactiae  (Group B), S. pneumoniae, S. pyogenes (Group A),  Acinetobacter baumannii, Enterobacter cloacae, E. coli,  Klebsiella oxytoca, K. pneumoniae, Proteus sp.,  Serratia marcescens, Haemophilus influenzae,  Neisseria meningitidis, Pseudomonas aeruginosa, Candida  albicans, C. glabrata, C krusei, C parapsilosis,  C. tropicalis and the KPC resistance gene.    Organism Identification: Blood Culture PCR    Method Type: PCR    Rapid Respiratory Viral Panel (18 @ 14:03)    Rapid RVP Result: Not[employer]ec: The [employer] RVP Rapid uses polymerase chain reaction (PCR) and melt  curve analysis to screen for adenovirus; coronavirus HKU1, NL63, 229E,  OC43; human metapneumovirus (hMPV); human enterovirus/rhinovirus  (Entero/RV); influenza A; influenza A/H1;influenza A/H3; influenza  A/H1-2009; influenza B; parainfluenza viruses 1, 2, 3, 4; respiratory  syncytial virus; Bordetella pertussis; Mycoplasma pneumoniae; and  Chlamydophila pneumoniae.

## 2018-03-14 NOTE — PROGRESS NOTE ADULT - SUBJECTIVE AND OBJECTIVE BOX
Problem List:  Patient with a history of Alzheimer disease residing in a NH.  She was brought to the ER for increased lethargy and fever.  She was hypotensive concha arrival with BP was 90/56 and HR was 108.  She was treated with broad spectrum antibiotics for sespsis and with IV fluids for hypotension . sepsis and Hyperglycemia.   MALIKA non oliguric improved with hydration.  Source of infection sacral decubitus.      Renal consult was called for Hypernatremia since admission, admission.  MALIKA  Patient received hydration with improved renal function and urine output  Creatinine, Serum: 1.21 mg/dL (03.13.18 @ 13:29)  Blood Urea Nitrogen, Serum: 45 mg/dL (03.13.18 @ 13:29)  Sodium, Serum: 150 mmol/L (03.13.18 @ 22:26)          Comprehensive Metabolic Panel (03.11.18 @ 12:18)    Sodium, Serum: 156 mmol/L    Potassium, Serum: 5.6: NOT HEMOLYZED mmol/L    Chloride, Serum: 121 mmol/L    Carbon Dioxide, Serum: 18 mmol/L    Anion Gap, Serum: 17 mmol/L    Blood Urea Nitrogen, Serum: 153 mg/dL    Creatinine, Serum: 5.15 mg/dL    Glucose, Serum: 584: TYPE:(C=Critical, N=Notification, A=Abnormal) C  TESTS: GLUC, LA  DATE/TIME CALLED: 03/11/18 12:51  CALLED TO: DR.C VALENTIN  READ BACK (2 Patient Identifiers)(Y/N): _Y  READ BACK VALUES (Y/N): _Y  CALLED BY: LAMBERTO03/11/18 12:51 mg/dL    Calcium, Total Serum: 9.8 mg/dL    Protein Total, Serum: 9.4 g/dL    Albumin, Serum: 2.4 g/dL    Bilirubin Total, Serum: 0.3 mg/dL    Alkaline Phosphatase, Serum: 100 U/L    Aspartate Aminotransferase (AST/SGOT): 28 U/L    Alanine Aminotransferase (ALT/SGPT): 39 U/L DA    eGFR if Non : 7: Interpretative comment      PAST MEDICAL & SURGICAL HISTORY:  PAD (peripheral artery disease)  Overactive bladder  GERD (gastroesophageal reflux disease)  Dyslipidemia  HTN (hypertension)  Pancreatitis  Dementia  Diabetes mellitus, type 2  Lung nodule: Right Lung Surgery  - 1/2014  History of tonsillectomy  PAD (peripheral artery disease): s/p left lower extremity stent  History of knee replacement, total, bilateral      penicillin (Rash)  sulfa drugs (Unknown)      MEDICATIONS  (STANDING):  chlorhexidine 2% Cloths 1 Application(s) Topical daily  dextrose 5%. 1000 milliLiter(s) (50 mL/Hr) IV Continuous <Continuous>  dextrose 5%. 1000 milliLiter(s) (60 mL/Hr) IV Continuous <Continuous>  dextrose 50% Injectable 12.5 Gram(s) IV Push once  dextrose 50% Injectable 25 Gram(s) IV Push once  dextrose 50% Injectable 25 Gram(s) IV Push once  heparin  Injectable 5000 Unit(s) SubCutaneous every 12 hours  insulin glargine Injectable (LANTUS) 6 Unit(s) SubCutaneous at bedtime  insulin lispro (HumaLOG) corrective regimen sliding scale   SubCutaneous every 6 hours  meropenem  IVPB 500 milliGRAM(s) IV Intermittent every 12 hours  norepinephrine Infusion 0.5 MICROgram(s)/kG/Min (19.125 mL/Hr) IV Continuous <Continuous>  pantoprazole  Injectable 40 milliGRAM(s) IV Push daily  vancomycin  IVPB 750 milliGRAM(s) IV Intermittent every 12 hours    MEDICATIONS  (PRN):  dextrose Gel 1 Dose(s) Oral once PRN Blood Glucose LESS THAN 70 milliGRAM(s)/deciliter  glucagon  Injectable 1 milliGRAM(s) IntraMuscular once PRN Glucose LESS THAN 70 milligrams/deciliter                            10.0   17.7  )-----------( 296      ( 14 Mar 2018 06:25 )             32.9     03-14    147<H>  |  120<H>  |  30<H>  ----------------------------<  202<H>  3.9   |  16<L>  |  0.97    Ca    7.9<L>      14 Mar 2018 06:25  Phos  2.2     03-14  Mg     1.8     03-14      VITALS:  T(F): 98.6 (03-14-18 @ 05:30), Max: 99.5 (03-13-18 @ 20:55)  HR: 129 (03-14-18 @ 08:30)  BP: 134/67 (03-14-18 @ 08:30)  RR: 18 (03-14-18 @ 08:30)  SpO2: 100% (03-14-18 @ 05:00)  Wt(kg): --    03-13 @ 07:01 - 03-14 @ 07:00  --------------------------------------------------------  IN: 3499.4 mL / OUT: 1225 mL / NET: 2274.4 mL    03-14 @ 07:01 - 03-14 @ 09:38  --------------------------------------------------------  IN: 420 mL / OUT: 125 mL / NET: 295 mL          PHYSICAL EXAM:  Constitutional: cachectic in no respiratory distress.  Neck: No JVD, no carotid bruit, supple, no adenopathy  Respiratory: non cooperative  Cardiovascular: S1, S2, RRR, no pericardial rub, no murmur  Abdomen: BS+, soft, no tenderness, no bruit  Pelvis: bladder nondistended with pompa catheter  Extremities: No cyanosis or clubbing. No peripheral edema.   Contraction of limbs  Neurological: open her eyes but no follow commends

## 2018-03-14 NOTE — PROGRESS NOTE ADULT - ASSESSMENT
Patient is a 83y old  Female with multiple co-morbidities including  Dementia, bedbound  and DM2, has sent in to the ER from Nursing Home for evaluation of acute renal failure with hyperkalemia, hyperosmolar hyperglycemic state secondary to diabetes and sepsis (11 Mar 2018 14:55). As per daughters at bedside she has been getting progressively less interactive in the past month and has been eating very little.  In the ER, she found to be afebrile but tachycardic and hypotensive to 90/56. The labs significant for positive Urine analysis, Leukocytosis, lactate of 7.3 and  Blood glucose of 584, elevated creatinine  of 5.15. 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 She has admitted to ICU since requiring pressor and started on broad spectrum antibiotics, cultures pending. The ID consult requested to assist with further evaluation and antibiotic  management.    # Septic shock  # GRAM negative bacteremia- Clostridium ramosum  # Infected Decubitus ulcer with Left gluteal  Abscess and Osteomyelitis- No surgical debridement as per Surgery  # UTI - proteus    Would recommend:  1. Repeat Blood cultures X 2 in AM  2. Add Clindamycin since requiring 2 pressors  3. Continue  Meropenem to cover Clostridium and Proteus  4. Monitor WBC count, is trending down  5. Titrate down pressors as tolerated  6. Wound care evaluation for chemical debridement    d/w ICU team    will follow the patient with you

## 2018-03-14 NOTE — PROGRESS NOTE ADULT - PROBLEM SELECTOR PLAN 4
improved to 161->151->156->154->153  on D5 @ 60  started on 250 free fluids q6 improved to 161->151->156->154->153->150->147  continue with 250 free fluids q6

## 2018-03-14 NOTE — PROGRESS NOTE ADULT - PROBLEM SELECTOR PLAN 3
cr on admission is 5.15 /153  likely form dehydration   improving with hydration improved with hydration

## 2018-03-14 NOTE — PROGRESS NOTE ADULT - ASSESSMENT
MALIKA on admission due to sepsis, hypotension and hypovolemia.  Metabolic acidosis and increased lactate on admission due to sepsis and ??Metformin.  Lactate level normalized.  Renal function improved with hydration.    On going metabolic acidosis most likely due to renal failure.  Suggest to repeat UA follow UA PH.      Hypernatremia due to dehydration improved.  Patient was plaved on NG feeds and free water    Hypotension may increase Midodrine if need    Hypo PO4.Follow vitamin D level and supplement PO4.

## 2018-03-14 NOTE — PROGRESS NOTE ADULT - SUBJECTIVE AND OBJECTIVE BOX
INTERVAL HPI/OVERNIGHT EVENTS: ***    PRESSORS: [x ] YES [ ] NO  WHICH: levophed    ANTIBIOTICS: vanco                 DATE STARTED:  ANTIBIOTICS:                  DATE STARTED:  ANTIBIOTICS:                  DATE STARTED:    Antimicrobial:  meropenem  IVPB 500 milliGRAM(s) IV Intermittent every 12 hours  vancomycin  IVPB 750 milliGRAM(s) IV Intermittent every 12 hours    Cardiovascular:  norepinephrine Infusion 0.5 MICROgram(s)/kG/Min IV Continuous <Continuous>    Pulmonary:    Hematalogic:  heparin  Injectable 5000 Unit(s) SubCutaneous every 12 hours    Other:  chlorhexidine 2% Cloths 1 Application(s) Topical daily  dextrose 5%. 1000 milliLiter(s) IV Continuous <Continuous>  dextrose 5%. 1000 milliLiter(s) IV Continuous <Continuous>  dextrose 50% Injectable 12.5 Gram(s) IV Push once  dextrose 50% Injectable 25 Gram(s) IV Push once  dextrose 50% Injectable 25 Gram(s) IV Push once  dextrose Gel 1 Dose(s) Oral once PRN  glucagon  Injectable 1 milliGRAM(s) IntraMuscular once PRN  insulin glargine Injectable (LANTUS) 6 Unit(s) SubCutaneous at bedtime  insulin lispro (HumaLOG) corrective regimen sliding scale   SubCutaneous every 6 hours  pantoprazole  Injectable 40 milliGRAM(s) IV Push daily  potassium phosphate IVPB 15 milliMole(s) IV Intermittent once    chlorhexidine 2% Cloths 1 Application(s) Topical daily  dextrose 5%. 1000 milliLiter(s) IV Continuous <Continuous>  dextrose 5%. 1000 milliLiter(s) IV Continuous <Continuous>  dextrose 50% Injectable 12.5 Gram(s) IV Push once  dextrose 50% Injectable 25 Gram(s) IV Push once  dextrose 50% Injectable 25 Gram(s) IV Push once  dextrose Gel 1 Dose(s) Oral once PRN  glucagon  Injectable 1 milliGRAM(s) IntraMuscular once PRN  heparin  Injectable 5000 Unit(s) SubCutaneous every 12 hours  insulin glargine Injectable (LANTUS) 6 Unit(s) SubCutaneous at bedtime  insulin lispro (HumaLOG) corrective regimen sliding scale   SubCutaneous every 6 hours  meropenem  IVPB 500 milliGRAM(s) IV Intermittent every 12 hours  norepinephrine Infusion 0.5 MICROgram(s)/kG/Min IV Continuous <Continuous>  pantoprazole  Injectable 40 milliGRAM(s) IV Push daily  potassium phosphate IVPB 15 milliMole(s) IV Intermittent once  vancomycin  IVPB 750 milliGRAM(s) IV Intermittent every 12 hours    Drug Dosing Weight  Height (cm): 124.46 (16 May 2017 20:03)  Weight (kg): 40.8 (11 Mar 2018 12:21)  BMI (kg/m2): 26.3 (11 Mar 2018 12:21)  BSA (m2): 1.15 (11 Mar 2018 12:21)    CENTRAL LINE: [ ] YES [ ] NO  LOCATION:   DATE INSERTED:  REMOVE: [ ] YES [ ] NO  EXPLAIN:    BANKS: [ ] YES [ ] NO    DATE INSERTED:  REMOVE:  [ ] YES [ ] NO  EXPLAIN:    A-LINE:  [ ] YES [ ] NO  LOCATION:   DATE INSERTED:  REMOVE:  [ ] YES [ ] NO  EXPLAIN:    PMH -reviewed admission note, no change since admission  Heart faliure: acute [ ] chronic [ ] acute or chronic [ ] diastolic [ ] systolic [ ] combied systolic and diastolic[ ]  MALIKA: ATN[ ] renal medullary necrosis [ ] CKD I [ ]CKDII [ ]CKD III [ ]CKD IV [ ]CKD V [ ]Other pathological lesions [ ]  Abdominal Nutrition Status: malnutrition [ ] cachexia [ ] morbid obesity/BMI=40 [ ] Supplement ordered [___________]     ICU Vital Signs Last 24 Hrs  T(C): 37 (14 Mar 2018 05:30), Max: 37.5 (13 Mar 2018 20:55)  T(F): 98.6 (14 Mar 2018 05:30), Max: 99.5 (13 Mar 2018 20:55)  HR: 139 (14 Mar 2018 08:24) (109 - 139)  BP: 99/72 (14 Mar 2018 08:24) (69/54 - 150/60)  BP(mean): 78 (14 Mar 2018 08:24) (48 - 96)  ABP: --  ABP(mean): --  RR: 16 (14 Mar 2018 08:24) (15 - 35)  SpO2: 100% (14 Mar 2018 05:00) (100% - 100%)            03-13 @ 07:01  -  03-14 @ 07:00  --------------------------------------------------------  IN: 3499.4 mL / OUT: 1225 mL / NET: 2274.4 mL            PHYSICAL EXAM:    GENERAL: [ ]NAD, [ ]well-groomed, [ ]well-developed  HEAD:  [ ]Atraumatic, [ ]Normocephalic  EYES: [ ]EOMI, [ ]PERRLA, [ ]conjunctiva and sclera clear  ENMT: [ ]No tonsillar erythema, exudates, or enlargement; [ ]Moist mucous membranes, [ ]Good dentition, [ ]No lesions  NECK: [ ]Supple, normal appearance, [ ]No JVD; [ ]Normal thyroid; [ ]Trachea midline  NERVOUS SYSTEM:  [ ]Alert & Oriented X3, [ ]Good concentration; [ ]Motor Strength 5/5 B/L upper and lower extremities; [ ]DTRs 2+ intact and symmetric  CHEST/LUNG: [ ]No chest deformity; [ ]Normal percussion bilaterally; [ ]No rales, rhonchi, wheezing   HEART: [ ]Regular rate and rhythm; [ ]No murmurs, rubs, or gallops  ABDOMEN: [ ]Soft, Nontender, Nondistended; [ ]Bowel sounds present  EXTREMITIES:  [ ]2+ Peripheral Pulses, [ ]No clubbing, cyanosis, or edema  LYMPH: [ ]No lymphadenopathy noted  SKIN: [ ]No rashes or lesions; [ ]Good capillary refill      LABS:  CBC Full  -  ( 14 Mar 2018 06:25 )  WBC Count : 17.7 K/uL  Hemoglobin : 10.0 g/dL  Hematocrit : 32.9 %  Platelet Count - Automated : 296 K/uL  Mean Cell Volume : 82.7 fl  Mean Cell Hemoglobin : 25.1 pg  Mean Cell Hemoglobin Concentration : 30.4 gm/dL  Auto Neutrophil # : x  Auto Lymphocyte # : x  Auto Monocyte # : x  Auto Eosinophil # : x  Auto Basophil # : x  Auto Neutrophil % : x  Auto Lymphocyte % : x  Auto Monocyte % : x  Auto Eosinophil % : x  Auto Basophil % : x    03-14    147<H>  |  120<H>  |  30<H>  ----------------------------<  202<H>  3.9   |  16<L>  |  0.97    Ca    7.9<L>      14 Mar 2018 06:25  Phos  2.2     03-14  Mg     1.8     03-14          Culture Results:   Growth in anaerobic bottle: Clostridium ramosum  "Susceptibilities not performed" (03-11 @ 21:58)  Culture Results:   Growth in anaerobic bottle: Clostridium ramosum  "Susceptibilities not performed"  "Due to technical problems, Proteus sp. will Not be reported as part of  the BCID panel until further notice"  ***Blood Panel PCR results on this specimen are available  approximately 3 hours after the Gram stain result.***  Gram stain, PCR, and/or culture results may not always  correspond due to difference in methodologies.  ************************************************************  This PCR assay was performed using [employer].  The following targets are tested for: Enterococcus,  vancomycin resistant enterococci, Listeria monocytogenes,  coagulase negative staphylococci, S. aureus,  methicillin resistant S. aureus, Streptococcus agalactiae  (Group B), S. pneumoniae, S. pyogenes (Group A),  Acinetobacter baumannii, Enterobacter cloacae, E. coli,  Klebsiella oxytoca, K. pneumoniae, Proteus sp.,  Serratia marcescens, Haemophilus influenzae,  Neisseria meningitidis, Pseudomonas aeruginosa, Candida  albicans, C. glabrata, C krusei, C parapsilosis,  C. tropicalis and the KPC resistance gene. (03-11 @ 21:58)  Culture Results:   >100,000 CFU/ml Proteus mirabilis (03-11 @ 21:38)      RADIOLOGY & ADDITIONAL STUDIES REVIEWED:  ***    [ ]GOALS OF CARE DISCUSSION WITH PATIENT/FAMILY/PROXY:    CRITICAL CARE TIME SPENT: 35 minutes INTERVAL HPI/OVERNIGHT EVENTS: ***    PRESSORS: [x ] YES [ ] NO  WHICH: levophed    ANTIBIOTICS: vanco                 DATE STARTED: 3/13  ANTIBIOTICS:   myriam               DATE STARTED: 3/11    Antimicrobial:  meropenem  IVPB 500 milliGRAM(s) IV Intermittent every 12 hours  vancomycin  IVPB 750 milliGRAM(s) IV Intermittent every 12 hours    Cardiovascular:  norepinephrine Infusion 0.5 MICROgram(s)/kG/Min IV Continuous <Continuous>    Pulmonary:    Hematalogic:  heparin  Injectable 5000 Unit(s) SubCutaneous every 12 hours    Other:  chlorhexidine 2% Cloths 1 Application(s) Topical daily  dextrose 5%. 1000 milliLiter(s) IV Continuous <Continuous>  dextrose 5%. 1000 milliLiter(s) IV Continuous <Continuous>  dextrose 50% Injectable 12.5 Gram(s) IV Push once  dextrose 50% Injectable 25 Gram(s) IV Push once  dextrose 50% Injectable 25 Gram(s) IV Push once  dextrose Gel 1 Dose(s) Oral once PRN  glucagon  Injectable 1 milliGRAM(s) IntraMuscular once PRN  insulin glargine Injectable (LANTUS) 6 Unit(s) SubCutaneous at bedtime  insulin lispro (HumaLOG) corrective regimen sliding scale   SubCutaneous every 6 hours  pantoprazole  Injectable 40 milliGRAM(s) IV Push daily  potassium phosphate IVPB 15 milliMole(s) IV Intermittent once    chlorhexidine 2% Cloths 1 Application(s) Topical daily  dextrose 5%. 1000 milliLiter(s) IV Continuous <Continuous>  dextrose 5%. 1000 milliLiter(s) IV Continuous <Continuous>  dextrose 50% Injectable 12.5 Gram(s) IV Push once  dextrose 50% Injectable 25 Gram(s) IV Push once  dextrose 50% Injectable 25 Gram(s) IV Push once  dextrose Gel 1 Dose(s) Oral once PRN  glucagon  Injectable 1 milliGRAM(s) IntraMuscular once PRN  heparin  Injectable 5000 Unit(s) SubCutaneous every 12 hours  insulin glargine Injectable (LANTUS) 6 Unit(s) SubCutaneous at bedtime  insulin lispro (HumaLOG) corrective regimen sliding scale   SubCutaneous every 6 hours  meropenem  IVPB 500 milliGRAM(s) IV Intermittent every 12 hours  norepinephrine Infusion 0.5 MICROgram(s)/kG/Min IV Continuous <Continuous>  pantoprazole  Injectable 40 milliGRAM(s) IV Push daily  potassium phosphate IVPB 15 milliMole(s) IV Intermittent once  vancomycin  IVPB 750 milliGRAM(s) IV Intermittent every 12 hours    Drug Dosing Weight  Height (cm): 124.46 (16 May 2017 20:03)  Weight (kg): 40.8 (11 Mar 2018 12:21)  BMI (kg/m2): 26.3 (11 Mar 2018 12:21)  BSA (m2): 1.15 (11 Mar 2018 12:21)    CENTRAL LINE: [x ] YES [ ] NO  LOCATION: [facility]   DATE INSERTED: 3/11  REMOVE: [ ] YES [ ] NO  EXPLAIN:    BANKS: [x ] YES [ ] NO    DATE INSERTED:3/11  REMOVE:  [ ] YES [ ] NO  EXPLAIN:    A-LINE:  [ ] YES [ x] NO  LOCATION:   DATE INSERTED:  REMOVE:  [ ] YES [ ] NO  EXPLAIN:    PMH -reviewed admission note, no change since admission  Heart faliure: acute [ ] chronic [ ] acute or chronic [ ] diastolic [ ] systolic [ ] combied systolic and diastolic[ ]  MALIKA: ATN[ ] renal medullary necrosis [ ] CKD I [ ]CKDII [ ]CKD III [ ]CKD IV [ ]CKD V [ ]Other pathological lesions [ ]  Abdominal Nutrition Status: malnutrition [ ] cachexia [ ] morbid obesity/BMI=40 [ ] Supplement ordered [___________]     ICU Vital Signs Last 24 Hrs  T(C): 37 (14 Mar 2018 05:30), Max: 37.5 (13 Mar 2018 20:55)  T(F): 98.6 (14 Mar 2018 05:30), Max: 99.5 (13 Mar 2018 20:55)  HR: 139 (14 Mar 2018 08:24) (109 - 139)  BP: 99/72 (14 Mar 2018 08:24) (69/54 - 150/60)  BP(mean): 78 (14 Mar 2018 08:24) (48 - 96)  ABP: --  ABP(mean): --  RR: 16 (14 Mar 2018 08:24) (15 - 35)  SpO2: 100% (14 Mar 2018 05:00) (100% - 100%)            03-13 @ 07:01  -  03-14 @ 07:00  --------------------------------------------------------  IN: 3499.4 mL / OUT: 1225 mL / NET: 2274.4 mL            PHYSICAL EXAM:    GENERAL: [x ]NAD, [x]Normocephalic  EYES: [x ]EOMI, [ ]PERRLA, [x ]conjunctiva and sclera clear  ENMT: [ x]No tonsillar erythema, exudates, or enlargement; [x ]Moist mucous membranes, [x ]Good dentition, [ ]No lesions  NECK: [ ]Supple, normal appearance, [ ]No JVD; [ ]Normal thyroid; [ ]Trachea midline  NERVOUS SYSTEM:  [x ]Alert & Oriented X0, [ ]Good concentration; [x ]Motor Strength unable to access- having spontaneous movements [ ]DTRs 2+ intact and symmetric  CHEST/LUNG: [x ]No chest deformity; [x ]Normal percussion bilaterally; [ ]No rales, rhonchi, wheezing   HEART: [x ]Regular rate and rhythm; [ ]No murmurs, rubs, or gallops  ABDOMEN: [x ]Soft, Nontender, Nondistended; [ ]Bowel sounds present  EXTREMITIES:  [x ]2+ Peripheral Pulses, [ ]No clubbing, cyanosis, or edema  LYMPH: [x ]No lymphadenopathy noted  SKIN: [ x]No rashes or lesions; [ ]Good capillary refill      LABS:  CBC Full  -  ( 14 Mar 2018 06:25 )  WBC Count : 17.7 K/uL  Hemoglobin : 10.0 g/dL  Hematocrit : 32.9 %  Platelet Count - Automated : 296 K/uL  Mean Cell Volume : 82.7 fl  Mean Cell Hemoglobin : 25.1 pg  Mean Cell Hemoglobin Concentration : 30.4 gm/dL  Auto Neutrophil # : x  Auto Lymphocyte # : x  Auto Monocyte # : x  Auto Eosinophil # : x  Auto Basophil # : x  Auto Neutrophil % : x  Auto Lymphocyte % : x  Auto Monocyte % : x  Auto Eosinophil % : x  Auto Basophil % : x    03-14    147<H>  |  120<H>  |  30<H>  ----------------------------<  202<H>  3.9   |  16<L>  |  0.97    Ca    7.9<L>      14 Mar 2018 06:25  Phos  2.2     03-14  Mg     1.8     03-14          Culture Results:   Growth in anaerobic bottle: Clostridium ramosum  "Susceptibilities not performed" (03-11 @ 21:58)  Culture Results:   Growth in anaerobic bottle: Clostridium ramosum  "Susceptibilities not performed"  "Due to technical problems, Proteus sp. will Not be reported as part of  the BCID panel until further notice"  ***Blood Panel PCR results on this specimen are available  approximately 3 hours after the Gram stain result.***  Gram stain, PCR, and/or culture results may not always  correspond due to difference in methodologies.  ************************************************************  This PCR assay was performed using [employer]  The following targets are tested for: Enterococcus,  vancomycin resistant enterococci, Listeria monocytogenes,  coagulase negative staphylococci, S. aureus,  methicillin resistant S. aureus, Streptococcus agalactiae  (Group B), S. pneumoniae, S. pyogenes (Group A),  Acinetobacter baumannii, Enterobacter cloacae, E. coli,  Klebsiella oxytoca, K. pneumoniae, Proteus sp.,  Serratia marcescens, Haemophilus influenzae,  Neisseria meningitidis, Pseudomonas aeruginosa, Candida  albicans, C. glabrata, C krusei, C parapsilosis,  C. tropicalis and the KPC resistance gene. (03-11 @ 21:58)  Culture Results:   >100,000 CFU/ml Proteus mirabilis (03-11 @ 21:38)      RADIOLOGY & ADDITIONAL STUDIES REVIEWED:  ***    [x ]GOALS OF CARE DISCUSSION WITH PATIENT/FAMILY/PROXY: DNR/DNI    CRITICAL CARE TIME SPENT: 35 minutes

## 2018-03-14 NOTE — PROGRESS NOTE ADULT - ASSESSMENT
Patient remains in the ICU with no changes clinically. Over all prognosis is very poor despite any medical intervention and the family is aware. Continue care as per ICU team.

## 2018-03-15 LAB
ANION GAP SERPL CALC-SCNC: 9 MMOL/L — SIGNIFICANT CHANGE UP (ref 5–17)
BUN SERPL-MCNC: 23 MG/DL — HIGH (ref 7–18)
CALCIUM SERPL-MCNC: 8 MG/DL — LOW (ref 8.4–10.5)
CHLORIDE SERPL-SCNC: 113 MMOL/L — HIGH (ref 96–108)
CO2 SERPL-SCNC: 19 MMOL/L — LOW (ref 22–31)
CREAT SERPL-MCNC: 0.78 MG/DL — SIGNIFICANT CHANGE UP (ref 0.5–1.3)
GLUCOSE SERPL-MCNC: 260 MG/DL — HIGH (ref 70–99)
HCT VFR BLD CALC: 30.6 % — LOW (ref 34.5–45)
HGB BLD-MCNC: 9.4 G/DL — LOW (ref 11.5–15.5)
LYMPHOCYTES # BLD AUTO: 7 % — LOW (ref 13–44)
MAGNESIUM SERPL-MCNC: 1.9 MG/DL — SIGNIFICANT CHANGE UP (ref 1.6–2.6)
MCHC RBC-ENTMCNC: 25.3 PG — LOW (ref 27–34)
MCHC RBC-ENTMCNC: 30.7 GM/DL — LOW (ref 32–36)
MCV RBC AUTO: 82.5 FL — SIGNIFICANT CHANGE UP (ref 80–100)
MONOCYTES NFR BLD AUTO: 2 % — SIGNIFICANT CHANGE UP (ref 2–14)
NEUTROPHILS NFR BLD AUTO: 89 % — HIGH (ref 43–77)
PHOSPHATE SERPL-MCNC: 2.4 MG/DL — LOW (ref 2.5–4.5)
PLATELET # BLD AUTO: 256 K/UL — SIGNIFICANT CHANGE UP (ref 150–400)
POTASSIUM SERPL-MCNC: 4.5 MMOL/L — SIGNIFICANT CHANGE UP (ref 3.5–5.3)
POTASSIUM SERPL-SCNC: 4.5 MMOL/L — SIGNIFICANT CHANGE UP (ref 3.5–5.3)
RBC # BLD: 3.7 M/UL — LOW (ref 3.8–5.2)
RBC # FLD: 14 % — SIGNIFICANT CHANGE UP (ref 10.3–14.5)
SODIUM SERPL-SCNC: 141 MMOL/L — SIGNIFICANT CHANGE UP (ref 135–145)
WBC # BLD: 19.9 K/UL — HIGH (ref 3.8–10.5)
WBC # FLD AUTO: 19.9 K/UL — HIGH (ref 3.8–10.5)

## 2018-03-15 RX ORDER — ALBUMIN HUMAN 25 %
50 VIAL (ML) INTRAVENOUS EVERY 8 HOURS
Qty: 0 | Refills: 0 | Status: DISCONTINUED | OUTPATIENT
Start: 2018-03-15 | End: 2018-03-15

## 2018-03-15 RX ORDER — ALBUMIN HUMAN 25 %
50 VIAL (ML) INTRAVENOUS EVERY 6 HOURS
Qty: 0 | Refills: 0 | Status: COMPLETED | OUTPATIENT
Start: 2018-03-15 | End: 2018-03-15

## 2018-03-15 RX ORDER — MIDODRINE HYDROCHLORIDE 2.5 MG/1
10 TABLET ORAL EVERY 8 HOURS
Qty: 0 | Refills: 0 | Status: DISCONTINUED | OUTPATIENT
Start: 2018-03-15 | End: 2018-03-20

## 2018-03-15 RX ORDER — POTASSIUM PHOSPHATE, MONOBASIC POTASSIUM PHOSPHATE, DIBASIC 236; 224 MG/ML; MG/ML
30 INJECTION, SOLUTION INTRAVENOUS ONCE
Qty: 0 | Refills: 0 | Status: COMPLETED | OUTPATIENT
Start: 2018-03-15 | End: 2018-03-15

## 2018-03-15 RX ORDER — MORPHINE SULFATE 50 MG/1
2 CAPSULE, EXTENDED RELEASE ORAL EVERY 4 HOURS
Qty: 0 | Refills: 0 | Status: DISCONTINUED | OUTPATIENT
Start: 2018-03-15 | End: 2018-03-20

## 2018-03-15 RX ADMIN — PANTOPRAZOLE SODIUM 40 MILLIGRAM(S): 20 TABLET, DELAYED RELEASE ORAL at 12:13

## 2018-03-15 RX ADMIN — INSULIN GLARGINE 6 UNIT(S): 100 INJECTION, SOLUTION SUBCUTANEOUS at 22:39

## 2018-03-15 RX ADMIN — MIDODRINE HYDROCHLORIDE 5 MILLIGRAM(S): 2.5 TABLET ORAL at 06:05

## 2018-03-15 RX ADMIN — MIDODRINE HYDROCHLORIDE 10 MILLIGRAM(S): 2.5 TABLET ORAL at 22:39

## 2018-03-15 RX ADMIN — Medication 100 MILLIGRAM(S): at 22:39

## 2018-03-15 RX ADMIN — Medication 50 MILLILITER(S): at 23:26

## 2018-03-15 RX ADMIN — Medication 4: at 01:33

## 2018-03-15 RX ADMIN — HEPARIN SODIUM 5000 UNIT(S): 5000 INJECTION INTRAVENOUS; SUBCUTANEOUS at 18:04

## 2018-03-15 RX ADMIN — HEPARIN SODIUM 5000 UNIT(S): 5000 INJECTION INTRAVENOUS; SUBCUTANEOUS at 06:04

## 2018-03-15 RX ADMIN — MORPHINE SULFATE 2 MILLIGRAM(S): 50 CAPSULE, EXTENDED RELEASE ORAL at 14:27

## 2018-03-15 RX ADMIN — Medication 100 MILLIGRAM(S): at 06:05

## 2018-03-15 RX ADMIN — MORPHINE SULFATE 2 MILLIGRAM(S): 50 CAPSULE, EXTENDED RELEASE ORAL at 16:02

## 2018-03-15 RX ADMIN — MIDODRINE HYDROCHLORIDE 10 MILLIGRAM(S): 2.5 TABLET ORAL at 14:27

## 2018-03-15 RX ADMIN — Medication 100 MILLIGRAM(S): at 14:27

## 2018-03-15 RX ADMIN — Medication 250 MILLIGRAM(S): at 06:04

## 2018-03-15 RX ADMIN — MORPHINE SULFATE 2 MILLIGRAM(S): 50 CAPSULE, EXTENDED RELEASE ORAL at 22:40

## 2018-03-15 RX ADMIN — Medication 50 MILLILITER(S): at 18:04

## 2018-03-15 RX ADMIN — MEROPENEM 100 MILLIGRAM(S): 1 INJECTION INTRAVENOUS at 06:05

## 2018-03-15 RX ADMIN — Medication 4: at 06:16

## 2018-03-15 RX ADMIN — Medication 50 MILLILITER(S): at 12:12

## 2018-03-15 RX ADMIN — MORPHINE SULFATE 2 MILLIGRAM(S): 50 CAPSULE, EXTENDED RELEASE ORAL at 22:39

## 2018-03-15 RX ADMIN — MORPHINE SULFATE 2 MILLIGRAM(S): 50 CAPSULE, EXTENDED RELEASE ORAL at 18:04

## 2018-03-15 RX ADMIN — Medication 4: at 12:13

## 2018-03-15 RX ADMIN — POTASSIUM PHOSPHATE, MONOBASIC POTASSIUM PHOSPHATE, DIBASIC 63.75 MILLIMOLE(S): 236; 224 INJECTION, SOLUTION INTRAVENOUS at 09:40

## 2018-03-15 RX ADMIN — MEROPENEM 100 MILLIGRAM(S): 1 INJECTION INTRAVENOUS at 18:05

## 2018-03-15 RX ADMIN — Medication 2: at 23:24

## 2018-03-15 RX ADMIN — Medication 3: at 18:05

## 2018-03-15 RX ADMIN — MORPHINE SULFATE 2 MILLIGRAM(S): 50 CAPSULE, EXTENDED RELEASE ORAL at 19:19

## 2018-03-15 NOTE — PROGRESS NOTE ADULT - ASSESSMENT
Patient is a 83y old  Female with multiple co-morbidities including  Dementia, bedbound  and DM2, has sent in to the ER from Nursing Home for evaluation of acute renal failure with hyperkalemia, hyperosmolar hyperglycemic state secondary to diabetes and sepsis (11 Mar 2018 14:55). As per daughters at bedside she has been getting progressively less interactive in the past month and has been eating very little.  In the ER, she found to be afebrile but tachycardic and hypotensive to 90/56. The labs significant for positive Urine analysis, Leukocytosis, lactate of 7.3 and  Blood glucose of 584, elevated creatinine  of 5.15. 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 She has admitted to ICU since requiring pressor and started on broad spectrum antibiotics, cultures pending. The ID consult requested to assist with further evaluation and antibiotic  management.    # Septic shock  # GRAM negative bacteremia- Clostridium ramosum  # Infected Decubitus ulcer with Left gluteal  Abscess and Osteomyelitis- No surgical debridement as per Surgery  # UTI - proteus    Would recommend:  1. Repeat Blood cultures X 2 in AM  2. Add Clindamycin since requiring 2 pressors  3. Continue  Meropenem to cover Clostridium and Proteus  4. Monitor WBC count, is trending down  5. Titrate down pressors as tolerated  6. Wound care evaluation for chemical debridement    d/w ICU team    will follow the patient with you Patient is a 83y old  Female with multiple co-morbidities including  Dementia, bedbound  and DM2, has sent in to the ER from Nursing Home for evaluation of acute renal failure with hyperkalemia, hyperosmolar hyperglycemic state secondary to diabetes and sepsis (11 Mar 2018 14:55). As per daughters at bedside she has been getting progressively less interactive in the past month and has been eating very little.  In the ER, she found to be afebrile but tachycardic and hypotensive to 90/56. The labs significant for positive Urine analysis, Leukocytosis, lactate of 7.3 and  Blood glucose of 584, elevated creatinine  of 5.15. 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 She has admitted to ICU since requiring pressor and started on broad spectrum antibiotics, cultures pending. The ID consult requested to assist with further evaluation and antibiotic  management.    # Septic shock  # GRAM negative bacteremia- Clostridium ramosum  # Infected Decubitus ulcer with Left gluteal  Abscess and Osteomyelitis- No surgical debridement as per Surgery  # UTI - proteus    Would recommend:  1. Follow up  Blood cultures to document clearing the blood stream  2. Continue  Clindamycin as a antitoxin in the setting of Clostridium bacteremia  3. Continue  Meropenem to cover Clostridium and Proteus  4. Vancomycin until sacral ulcer debrided and culture is available  5. Monitor WBC count, stay elevated  6. Titrate down pressors as tolerated  7. Continue Wound care     d/w ICU team    will follow the patient with you

## 2018-03-15 NOTE — PROGRESS NOTE ADULT - SUBJECTIVE AND OBJECTIVE BOX
INTERVAL HPI/OVERNIGHT EVENTS: ***    PRESSORS: [x ] YES [ ] NO  WHICH: levophed    ANTIBIOTICS:  clinda                DATE STARTED: 3/14  ANTIBIOTICS:  myriam                DATE STARTED: 3/11  ANTIBIOTICS:  vanco              DATE STARTED: 3/13    Antimicrobial:  clindamycin IVPB 900 milliGRAM(s) IV Intermittent every 8 hours  clindamycin IVPB      meropenem  IVPB 500 milliGRAM(s) IV Intermittent every 12 hours  vancomycin  IVPB 750 milliGRAM(s) IV Intermittent every 12 hours    Cardiovascular:  midodrine 5 milliGRAM(s) Oral every 8 hours  norepinephrine Infusion 0.5 MICROgram(s)/kG/Min IV Continuous <Continuous>    Pulmonary:    Hematalogic:  heparin  Injectable 5000 Unit(s) SubCutaneous every 12 hours    Other:  chlorhexidine 2% Cloths 1 Application(s) Topical daily  dextrose 50% Injectable 12.5 Gram(s) IV Push once  dextrose 50% Injectable 25 Gram(s) IV Push once  dextrose 50% Injectable 25 Gram(s) IV Push once  dextrose Gel 1 Dose(s) Oral once PRN  glucagon  Injectable 1 milliGRAM(s) IntraMuscular once PRN  insulin glargine Injectable (LANTUS) 6 Unit(s) SubCutaneous at bedtime  insulin lispro (HumaLOG) corrective regimen sliding scale   SubCutaneous every 6 hours  morphine  - Injectable 2 milliGRAM(s) IV Push every 6 hours PRN  pantoprazole  Injectable 40 milliGRAM(s) IV Push daily  potassium phosphate IVPB 30 milliMole(s) IV Intermittent once  vasopressin Infusion 0.04 Unit(s)/Min IV Continuous <Continuous>    chlorhexidine 2% Cloths 1 Application(s) Topical daily  clindamycin IVPB 900 milliGRAM(s) IV Intermittent every 8 hours  clindamycin IVPB      dextrose 50% Injectable 12.5 Gram(s) IV Push once  dextrose 50% Injectable 25 Gram(s) IV Push once  dextrose 50% Injectable 25 Gram(s) IV Push once  dextrose Gel 1 Dose(s) Oral once PRN  glucagon  Injectable 1 milliGRAM(s) IntraMuscular once PRN  heparin  Injectable 5000 Unit(s) SubCutaneous every 12 hours  insulin glargine Injectable (LANTUS) 6 Unit(s) SubCutaneous at bedtime  insulin lispro (HumaLOG) corrective regimen sliding scale   SubCutaneous every 6 hours  meropenem  IVPB 500 milliGRAM(s) IV Intermittent every 12 hours  midodrine 5 milliGRAM(s) Oral every 8 hours  morphine  - Injectable 2 milliGRAM(s) IV Push every 6 hours PRN  norepinephrine Infusion 0.5 MICROgram(s)/kG/Min IV Continuous <Continuous>  pantoprazole  Injectable 40 milliGRAM(s) IV Push daily  potassium phosphate IVPB 30 milliMole(s) IV Intermittent once  vancomycin  IVPB 750 milliGRAM(s) IV Intermittent every 12 hours  vasopressin Infusion 0.04 Unit(s)/Min IV Continuous <Continuous>    Drug Dosing Weight  Height (cm): 124.46 (16 May 2017 20:03)  Weight (kg): 40.8 (11 Mar 2018 12:21)  BMI (kg/m2): 26.3 (11 Mar 2018 12:21)  BSA (m2): 1.15 (11 Mar 2018 12:21)    CENTRAL LINE: [x ] YES [ ] NO  LOCATION:[facility]  DATE INSERTED: 3/12  REMOVE: [ ] YES [ ] NO  EXPLAIN:    BANKS: [x ] YES [ ] NO    DATE INSERTED: 3/11  REMOVE:  [ ] YES [ ] NO  EXPLAIN:    A-LINE:  [ ] YES [x ] NO  LOCATION:   DATE INSERTED:  REMOVE:  [ ] YES [ ] NO  EXPLAIN:    PMH -reviewed admission note, no change since admission  Heart faliure: acute [ ] chronic [ ] acute or chronic [ ] diastolic [ ] systolic [ ] combied systolic and diastolic[ ]  MALIKA: ATN[ ] renal medullary necrosis [ ] CKD I [ ]CKDII [ ]CKD III [ ]CKD IV [ ]CKD V [ ]Other pathological lesions [ ]  Abdominal Nutrition Status: malnutrition [x ] cachexia [ ] morbid obesity/BMI=40 [ ] Supplement ordered [___________]     ICU Vital Signs Last 24 Hrs  T(C): 36.1 (15 Mar 2018 06:00), Max: 37.6 (14 Mar 2018 19:42)  T(F): 97 (15 Mar 2018 06:00), Max: 99.6 (14 Mar 2018 19:42)  HR: 83 (15 Mar 2018 07:00) (83 - 143)  BP: 91/76 (15 Mar 2018 07:00) (66/53 - 134/67)  BP(mean): 82 (15 Mar 2018 07:00) (49 - 106)  RR: 8 (15 Mar 2018 07:00) (0 - 26)  SpO2: 100% (15 Mar 2018 00:30) (100% - 100%)            03-14 @ 07:01  -  03-15 @ 07:00  --------------------------------------------------------  IN: 3496.1 mL / OUT: 1370 mL / NET: 2126.1 mL            PHYSICAL EXAM:    GENERAL: [x ]NAD, [x ]well-groomed, [x ]well-developed  HEAD:  [x ]Atraumatic, [x ]Normocephalic  EYES: [x ]EOMI, [x ]PERRLA, [x ]conjunctiva and sclera clear  ENMT: [ x]No tonsillar erythema, exudates, or enlargement; [ ]Moist mucous membranes, [ ]Good dentition, [ ]No lesions  NECK: [x ]Supple, normal appearance, [ ]No JVD; [ ]Normal thyroid; [ ]Trachea midline  NERVOUS SYSTEM:  [x ]Alert & Oriented X0, [ ]Good concentration; [x]unable to access strength, moving extremities spontaneously   CHEST/LUNG: [x ]No chest deformity; [x ]Normal percussion bilaterally; [ ]No rales, rhonchi, wheezing   HEART: [x]Regular rate and rhythm; [x ]No murmurs, rubs, or gallops  ABDOMEN: [x ]Soft, Nontender, Nondistended; [ ]Bowel sounds present  EXTREMITIES:  [x ]2+ Peripheral Pulses, [ ]No clubbing, cyanosis, or edema  LYMPH: [x ]No lymphadenopathy noted  SKIN: [x ]No rashes or lesions; [ ]Good capillary refill      LABS:  CBC Full  -  ( 15 Mar 2018 06:43 )  WBC Count : 19.9 K/uL  Hemoglobin : 9.4 g/dL  Hematocrit : 30.6 %  Platelet Count - Automated : 256 K/uL  Mean Cell Volume : 82.5 fl  Mean Cell Hemoglobin : 25.3 pg  Mean Cell Hemoglobin Concentration : 30.7 gm/dL  Auto Neutrophil # : x  Auto Lymphocyte # : x  Auto Monocyte # : x  Auto Eosinophil # : x  Auto Basophil # : x  Auto Neutrophil % : x  Auto Lymphocyte % : x  Auto Monocyte % : x  Auto Eosinophil % : x  Auto Basophil % : x    03-15    141  |  113<H>  |  23<H>  ----------------------------<  260<H>  4.5   |  19<L>  |  0.78    Ca    8.0<L>      15 Mar 2018 06:43  Phos  2.4     03-15  Mg     1.9     03-15              RADIOLOGY & ADDITIONAL STUDIES REVIEWED:  ***    [x ]GOALS OF CARE DISCUSSION WITH PATIENT/FAMILY/PROXY: DNR/DNi    CRITICAL CARE TIME SPENT: 35 minutes

## 2018-03-15 NOTE — PROGRESS NOTE ADULT - PROBLEM SELECTOR PLAN 1
Secondary to UTI and infected sacral ulcer   • continue with  meropenem [renally adjusted, allergic to pencillin] and started on vanco for clostridium ramosum  clindamycin added as per ID recommendation for unknown organism in   - follow up with surgery  - possible gluteal abcess noted on CT abdomen pelvis  blood culture gram negative rods in anaerobic bottle- clostridium ramosum and urine culture shows proteus - follow final results

## 2018-03-15 NOTE — PROGRESS NOTE ADULT - SUBJECTIVE AND OBJECTIVE BOX
Patient is seen and examined at the bed side, is afebrile. She is on two pressors now. The Leukocytosis continues trending down.          REVIEW OF SYSTEMS: Unable to obtain since patient is nonverbal         ICU Vital Signs Last 24 Hrs  T(C): 35.7 (15 Mar 2018 20:00), Max: 36.9 (15 Mar 2018 13:00)  T(F): 96.2 (15 Mar 2018 20:00), Max: 98.5 (15 Mar 2018 13:00)  HR: 68 (15 Mar 2018 21:30) (68 - 109)  BP: 88/56 (15 Mar 2018 21:30) (77/53 - 102/72)  BP(mean): 66 (15 Mar 2018 21:30) (62 - 83)  ABP: --  ABP(mean): --  RR: 9 (15 Mar 2018 21:30) (0 - 26)  SpO2: 97% (15 Mar 2018 21:30) (97% - 100%)          ALLERGIES: NKDA          PHYSICAL EXAM:  GENERAL: Not in  distress  CVS: s1 and s2 present  RESP: Air entry B/L  GI: Abdomen nondistended and nontender  BACK: Stage IV  decubital ulcer with malodorous drainage  EXT: No pedal edema   CNS: Non verbal          LABS:                        9.4    19.9  )-----------( 256      ( 15 Mar 2018 06:43 )             30.6                           10.0   17.7  )-----------( 296      ( 14 Mar 2018 06:25 )             32.9                                      11.0   22.7  )-----------( 393      ( 13 Mar 2018 07:03 )             36.3                  10.1   25.8  )-----------( 354      ( 12 Mar 2018 06:44 )             34.3                  03-15    141  |  113<H>  |  23<H>  ----------------------------<  260<H>  4.5   |  19<L>  |  0.78    Ca    8.0<L>      15 Mar 2018 06:43  Phos  2.4     03-15  Mg     1.9     -15      03-14    143  |  116<H>  |  25<H>  ----------------------------<  239<H>  4.6   |  18<L>  |  0.86    Ca    7.8<L>      14 Mar 2018 15:45  Phos  2.2       Mg     1.8                 TPro  6.5  /  Alb  1.6<L>  /  TBili  0.2  /  DBili  x   /  AST  28  /  ALT  29  /  AlkPhos  69  03-11             Urinalysis Basic - ( 11 Mar 2018 13:40 )    Color: Yellow / Appearance: Turbid / S.010 / pH: x  Gluc: x / Ketone: Negative  / Bili: Small / Urobili: 1   Blood: x / Protein: 100 / Nitrite: Positive   Leuk Esterase: Moderate / RBC: 5-10 /HPF / WBC >50 /HPF   Sq Epi: x / Non Sq Epi: Occasional /HPF / Bacteria: TNTC /HPF      CAPILLARY BLOOD GLUCOSE      POCT Blood Glucose.: 100 mg/dL (11 Mar 2018 19:52)  POCT Blood Glucose.: 125 mg/dL (11 Mar 2018 18:56)  POCT Blood Glucose.: 167 mg/dL (11 Mar 2018 18:02)  POCT Blood Glucose.: 222 mg/dL (11 Mar 2018 17:14)  POCT Blood Glucose.: 200 mg/dL (11 Mar 2018 17:12)  POCT Blood Glucose.: 422 mg/dL (11 Mar 2018 15:02)  POCT Blood Glucose.: 467 mg/dL (11 Mar 2018 13:41)  POCT Blood Glucose.: 458 mg/dL (11 Mar 2018 11:42)    ABG - ( 11 Mar 2018 12:52 )  pH: 7.28  /  pCO2: 32    /  pO2: 49    / HCO3: 14    / Base Excess: -11.2 /  SaO2: 78              MEDICATIONS  (STANDING):  albumin human 25% IVPB 50 milliLiter(s) IV Intermittent every 6 hours  chlorhexidine 2% Cloths 1 Application(s) Topical daily  clindamycin IVPB 900 milliGRAM(s) IV Intermittent every 8 hours  clindamycin IVPB      dextrose 50% Injectable 12.5 Gram(s) IV Push once  dextrose 50% Injectable 25 Gram(s) IV Push once  dextrose 50% Injectable 25 Gram(s) IV Push once  heparin  Injectable 5000 Unit(s) SubCutaneous every 12 hours  insulin glargine Injectable (LANTUS) 6 Unit(s) SubCutaneous at bedtime  insulin lispro (HumaLOG) corrective regimen sliding scale   SubCutaneous every 6 hours  meropenem  IVPB 500 milliGRAM(s) IV Intermittent every 12 hours  midodrine 10 milliGRAM(s) Oral every 8 hours  morphine  - Injectable 2 milliGRAM(s) IV Push every 4 hours  norepinephrine Infusion 0.5 MICROgram(s)/kG/Min (19.125 mL/Hr) IV Continuous <Continuous>  pantoprazole  Injectable 40 milliGRAM(s) IV Push daily  vasopressin Infusion 0.04 Unit(s)/Min (2.4 mL/Hr) IV Continuous <Continuous>    MEDICATIONS  (PRN):  dextrose Gel 1 Dose(s) Oral once PRN Blood Glucose LESS THAN 70 milliGRAM(s)/deciliter  glucagon  Injectable 1 milliGRAM(s) IntraMuscular once PRN Glucose LESS THAN 70 milligrams/deciliter          RADIOLOGY & ADDITIONAL TESTS:    3/11/18 CT Abdomen and Pelvis No Cont (18 @ 15:51) Sacral decubitus ulcer with suspected osteomyelitis and possible intramuscular left gluteal abscesses.      MICROBIOLOGY DATA:    Culture - Blood (18 @ 21:58)    Gram Stain:   Growth in anaerobic bottle: Gram Negative Rods    Specimen Source: .Blood Blood-Peripheral    Culture Results:   Growth in anaerobic bottle: Clostridium ramosum  "Susceptibilities not performed"        Culture - Urine (18 @ 21:38)    -  Amikacin: S <=8    -  Ampicillin: S <=2    -  Ampicillin/Sulbactam: S <=4/2    -  Aztreonam: S <=4    -  Cefazolin: S <=2    -  Cefepime: S <=2    -  Cefoxitin: S <=4    -  Ceftazidime: S <=1    -  Ceftriaxone: S <=1    -  Ciprofloxacin: R >2    -  Ertapenem: S <=0.5    -  Gentamicin: S 2    -  Levofloxacin: R >4    -  Meropenem: S <=1    -  Nitrofurantoin: R 64    -  Piperacillin/Tazobactam: S <=8    -  Tobramycin: S <=2    -  Trimethoprim/Sulfamethoxazole: R >    Specimen Source: .Urine Catheterized    Culture Results:   >100,000 CFU/ml Proteus mirabilis    Organism Identification: Proteus mirabilis    Organism: Proteus mirabilis    Method Type: RONNA          Culture - Blood (18 @ 21:58)    -  Multidrug (KPC pos) resistant organism: Nondet    -  Staphylococcus aureus: Nondet    -  Methicillin resistant Staphylococcus aureus (MRSA): Nondet    -  Coagulase negative Staphylococcus: Nondet    -  Enterococcus species: Nondet    -  Vancomycin resistant Enterococcus sp.: Nondet    -  Escherichia coli: Nondet    -  Klebsiella oxytoca: Nondet    -  Klebsiella pneumoniae: Nondet    -  Serratia marcescens: Nondet    -  Haemophilus influenzae: Nondet    -  Listeria monocytogenes: Nondet    -  Neisseria meningitidis: Nondet    -  Pseudomonas aeruginosa: Nondet    -  Acinetobacter baumanii: Nondet    -  Enterobacter cloacae complex: Nondet    -  Streptococcus sp. (Not Grp A, B or S pneumoniae): Nondet    -  Streptococcus agalactiae (Group B): Nondet    -  Streptococcus pyogenes (Group A): Nondet    -  Streptococcus pneumoniae: Nondet    -  Candida albicans: Nondet    -  Candida glabrata: Nondet    -  Candida krusei: Nondet  Culture - Blood (18 @ 21:58)    Gram Stain:   Growth in anaerobic bottle: Gram Negative Rods    Specimen Source: .Blood Blood-Peripheral    Culture Results:   Growth in anaerobic bottle: Gram Negative Rods      -  Candida parapsilosis: Nondet    -  Candida tropicalis: Nondet    Gram Stain:   Growth in anaerobic bottle: Gram Negative Rods    Specimen Source: .Blood Blood-Peripheral    Organism: Blood Culture PCR    Culture Results:   Growth in anaerobic bottle: Gram Negative Rods  "Due to technical problems, Proteus sp. will Not be reported as part of  the BCID panel until further notice"  ***Blood Panel PCR results on this specimen are available  approximately 3 hours after the Gram stain result.***  Gram stain, PCR, and/or culture results may not always  correspond due to difference in methodologies.  ************************************************************  This PCR assay was performed using [employer].  The following targets are tested for: Enterococcus,  vancomycin resistant enterococci, Listeria monocytogenes,  coagulase negative staphylococci, S. aureus,  methicillin resistant S. aureus, Streptococcus agalactiae  (Group B), S. pneumoniae, S. pyogenes (Group A),  Acinetobacter baumannii, Enterobacter cloacae, E. coli,  Klebsiella oxytoca, K. pneumoniae, Proteus sp.,  Serratia marcescens, Haemophilus influenzae,  Neisseria meningitidis, Pseudomonas aeruginosa, Candida  albicans, C. glabrata, C krusei, C parapsilosis,  C. tropicalis and the KPC resistance gene.    Organism Identification: Blood Culture PCR    Method Type: PCR    Rapid Respiratory Viral Panel (18 @ 14:03)    Rapid RVP Result: NotDetec: The [employer] RVP Rapid uses polymerase chain reaction (PCR) and melt  curve analysis to screen for adenovirus; coronavirus HKU1, NL63, 229E,  OC43; human metapneumovirus (hMPV); human enterovirus/rhinovirus  (Entero/RV); influenza A; influenza A/H1;influenza A/H3; influenza  A/H1-2009; influenza B; parainfluenza viruses 1, 2, 3, 4; respiratory  syncytial virus; Bordetella pertussis; Mycoplasma pneumoniae; and  Chlamydophila pneumoniae. Patient is seen and examined at the bed side, is afebrile. She is on two pressors now. The WBC  count stay elevated. The debridement tomorrow AS per ICU team.        REVIEW OF SYSTEMS: Unable to obtain since patient is nonverbal         ICU Vital Signs Last 24 Hrs  T(C): 35.7 (15 Mar 2018 20:00), Max: 36.9 (15 Mar 2018 13:00)  T(F): 96.2 (15 Mar 2018 20:00), Max: 98.5 (15 Mar 2018 13:00)  HR: 68 (15 Mar 2018 21:30) (68 - 109)  BP: 88/56 (15 Mar 2018 21:30) (77/53 - 102/72)  BP(mean): 66 (15 Mar 2018 21:30) (62 - 83)  ABP: --  ABP(mean): --  RR: 9 (15 Mar 2018 21:30) (0 - 26)  SpO2: 97% (15 Mar 2018 21:30) (97% - 100%)          ALLERGIES: NKDA          PHYSICAL EXAM:  GENERAL: Not in  distress  CVS: s1 and s2 present  RESP: Air entry B/L  GI: Abdomen nondistended and nontender  BACK: Stage IV  decubital ulcer with malodorous drainage  EXT: No pedal edema   CNS: Non verbal          LABS:                        9.4    19.9  )-----------( 256      ( 15 Mar 2018 06:43 )             30.6                           10.0   17.7  )-----------(               10.1   25.8  )-----------( 354      ( 12 Mar 2018 06:44 )             34.3                  -15    141  |  113<H>  |  23<H>  ----------------------------<  260<H>  4.5   |  19<L>  |  0.78    Ca    8.0<L>      15 Mar 2018 06:43  Phos  2.4     03-15  Mg     1.9     03-15      03-14    143  |  116<H>  |  25<H>  ----------------------------<  239<H>  4.6   |  18<L>  |  0.86    Ca    7.8<L>      14 Mar 2018 15:45  Phos  2.2     03-14  Mg     1.8     -14            TPro  6.5  /  Alb  1.6<L>  /  TBili  0.2  /  DBili  x   /  AST  28  /  ALT  29  /  AlkPhos  69  03-11             Urinalysis Basic - ( 11 Mar 2018 13:40 )    Color: Yellow / Appearance: Turbid / S.010 / pH: x  Gluc: x / Ketone: Negative  / Bili: Small / Urobili: 1   Blood: x / Protein: 100 / Nitrite: Positive   Leuk Esterase: Moderate / RBC: 5-10 /HPF / WBC >50 /HPF   Sq Epi: x / Non Sq Epi: Occasional /HPF / Bacteria: TNTC /HPF      CAPILLARY BLOOD GLUCOSE      POCT Blood Glucose.: 100 mg/dL (11 Mar 2018 19:52)  POCT Blood Glucose.: 125 mg/dL (11 Mar 2018 18:56)  POCT Blood Glucose.: 167 mg/dL (11 Mar 2018 18:02)  POCT Blood Glucose.: 222 mg/dL (11 Mar 2018 17:14)  POCT Blood Glucose.: 200 mg/dL (11 Mar 2018 17:12)  POCT Blood Glucose.: 422 mg/dL (11 Mar 2018 15:02)  POCT Blood Glucose.: 467 mg/dL (11 Mar 2018 13:41)  POCT Blood Glucose.: 458 mg/dL (11 Mar 2018 11:42)    ABG - ( 11 Mar 2018 12:52 )  pH: 7.28  /  pCO2: 32    /  pO2: 49    / HCO3: 14    / Base Excess: -11.2 /  SaO2: 78              MEDICATIONS  (STANDING):  albumin human 25% IVPB 50 milliLiter(s) IV Intermittent every 6 hours  chlorhexidine 2% Cloths 1 Application(s) Topical daily  clindamycin IVPB 900 milliGRAM(s) IV Intermittent every 8 hours  clindamycin IVPB      dextrose 50% Injectable 12.5 Gram(s) IV Push once  dextrose 50% Injectable 25 Gram(s) IV Push once  dextrose 50% Injectable 25 Gram(s) IV Push once  heparin  Injectable 5000 Unit(s) SubCutaneous every 12 hours  insulin glargine Injectable (LANTUS) 6 Unit(s) SubCutaneous at bedtime  insulin lispro (HumaLOG) corrective regimen sliding scale   SubCutaneous every 6 hours  meropenem  IVPB 500 milliGRAM(s) IV Intermittent every 12 hours  midodrine 10 milliGRAM(s) Oral every 8 hours  morphine  - Injectable 2 milliGRAM(s) IV Push every 4 hours  norepinephrine Infusion 0.5 MICROgram(s)/kG/Min (19.125 mL/Hr) IV Continuous <Continuous>  pantoprazole  Injectable 40 milliGRAM(s) IV Push daily  vasopressin Infusion 0.04 Unit(s)/Min (2.4 mL/Hr) IV Continuous <Continuous>    MEDICATIONS  (PRN):  dextrose Gel 1 Dose(s) Oral once PRN Blood Glucose LESS THAN 70 milliGRAM(s)/deciliter  glucagon  Injectable 1 milliGRAM(s) IntraMuscular once PRN Glucose LESS THAN 70 milligrams/deciliter          RADIOLOGY & ADDITIONAL TESTS:    3/11/18 CT Abdomen and Pelvis No Cont (18 @ 15:51) Sacral decubitus ulcer with suspected osteomyelitis and possible intramuscular left gluteal abscesses.          MICROBIOLOGY DATA:      Culture - Blood (18 @ 21:58)    Gram Stain:   Growth in anaerobic bottle: Gram Negative Rods    Specimen Source: .Blood Blood-Peripheral    Culture Results:   Growth in anaerobic bottle: Clostridium ramosum  "Susceptibilities not performed"        Culture - Urine (18 @ 21:38)    -  Amikacin: S <=8    -  Ampicillin: S <=2    -  Ampicillin/Sulbactam: S <=4/2    -  Aztreonam: S <=4    -  Cefazolin: S <=2    -  Cefepime: S <=2    -  Cefoxitin: S <=4    -  Ceftazidime: S <=1    -  Ceftriaxone: S <=1    -  Ciprofloxacin: R >2    -  Ertapenem: S <=0.5    -  Gentamicin: S 2    -  Levofloxacin: R >4    -  Meropenem: S <=1    -  Nitrofurantoin: R 64    -  Piperacillin/Tazobactam: S <=8    -  Tobramycin: S <=2    -  Trimethoprim/Sulfamethoxazole: R >    Specimen Source: .Urine Catheterized    Culture Results:   >100,000 CFU/ml Proteus mirabilis    Organism Identification: Proteus mirabilis    Organism: Proteus mirabilis    Method Type: RONNA          Culture - Blood (18 @ 21:58)    -  Multidrug (KPC pos) resistant organism: Nondet    -  Staphylococcus aureus: Nondet    -  Methicillin resistant Staphylococcus aureus (MRSA): Nondet    -  Coagulase negative Staphylococcus: Nondet    -  Enterococcus species: Nondet    -  Vancomycin resistant Enterococcus sp.: Nondet    -  Escherichia coli: Nondet    -  Klebsiella oxytoca: Nondet    -  Klebsiella pneumoniae: Nondet    -  Serratia marcescens: Nondet    -  Haemophilus influenzae: Nondet    -  Listeria monocytogenes: Nondet    -  Neisseria meningitidis: Nondet    -  Pseudomonas aeruginosa: Nondet    -  Acinetobacter baumanii: Nondet    -  Enterobacter cloacae complex: Nondet    -  Streptococcus sp. (Not Grp A, B or S pneumoniae): Nondet    -  Streptococcus agalactiae (Group B): Nondet    -  Streptococcus pyogenes (Group A): Nondet    -  Streptococcus pneumoniae: Nondet    -  Candida albicans: Nondet    -  Candida glabrata: Nondet    -  Candida krusei: Nondet  Culture - Blood (18 @ 21:58)    Gram Stain:   Growth in anaerobic bottle: Gram Negative Rods    Specimen Source: .Blood Blood-Peripheral    Culture Results:   Growth in anaerobic bottle: Gram Negative Rods      -  Candida parapsilosis: Nondet    -  Candida tropicalis: Nondet    Gram Stain:   Growth in anaerobic bottle: Gram Negative Rods    Specimen Source: .Blood Blood-Peripheral    Organism: Blood Culture PCR    Culture Results:   Growth in anaerobic bottle: Gram Negative Rods  "Due to technical problems, Proteus sp. will Not be reported as part of  the BCID panel until further notice"  ***Blood Panel PCR results on this specimen are available  approximately 3 hours after the Gram stain result.***  Gram stain, PCR, and/or culture results may not always  correspond due to difference in methodologies.  ************************************************************  This PCR assay was performed using [employer].  The following targets are tested for: Enterococcus,  vancomycin resistant enterococci, Listeria monocytogenes,  coagulase negative staphylococci, S. aureus,  methicillin resistant S. aureus, Streptococcus agalactiae  (Group B), S. pneumoniae, S. pyogenes (Group A),  Acinetobacter baumannii, Enterobacter cloacae, E. coli,  Klebsiella oxytoca, K. pneumoniae, Proteus sp.,  Serratia marcescens, Haemophilus influenzae,  Neisseria meningitidis, Pseudomonas aeruginosa, Candida  albicans, C. glabrata, C krusei, C parapsilosis,  C. tropicalis and the KPC resistance gene.    Organism Identification: Blood Culture PCR    Method Type: PCR    Rapid Respiratory Viral Panel (18 @ 14:03)    Rapid RVP Result: NotDetec: The Film[employer] RVP Rapid uses polymerase chain reaction (PCR) and melt  curve analysis to screen for adenovirus; coronavirus HKU1, NL63, 229E,  OC43; human metapneumovirus (hMPV); human enterovirus/rhinovirus  (Entero/RV); influenza A; influenza A/H1;influenza A/H3; influenza  A/H1-2009; influenza B; parainfluenza viruses 1, 2, 3, 4; respiratory  syncytial virus; Bordetella pertussis; Mycoplasma pneumoniae; and  Chlamydophila pneumoniae.

## 2018-03-16 LAB
ANION GAP SERPL CALC-SCNC: 8 MMOL/L — SIGNIFICANT CHANGE UP (ref 5–17)
BUN SERPL-MCNC: 27 MG/DL — HIGH (ref 7–18)
CALCIUM SERPL-MCNC: 8.2 MG/DL — LOW (ref 8.4–10.5)
CHLORIDE SERPL-SCNC: 108 MMOL/L — SIGNIFICANT CHANGE UP (ref 96–108)
CO2 SERPL-SCNC: 21 MMOL/L — LOW (ref 22–31)
CREAT SERPL-MCNC: 0.64 MG/DL — SIGNIFICANT CHANGE UP (ref 0.5–1.3)
GLUCOSE SERPL-MCNC: 218 MG/DL — HIGH (ref 70–99)
HCT VFR BLD CALC: 26.1 % — LOW (ref 34.5–45)
HGB BLD-MCNC: 8.3 G/DL — LOW (ref 11.5–15.5)
INR BLD: 1.18 RATIO — HIGH (ref 0.88–1.16)
MAGNESIUM SERPL-MCNC: 2 MG/DL — SIGNIFICANT CHANGE UP (ref 1.6–2.6)
MCHC RBC-ENTMCNC: 26.1 PG — LOW (ref 27–34)
MCHC RBC-ENTMCNC: 31.7 GM/DL — LOW (ref 32–36)
MCV RBC AUTO: 82.3 FL — SIGNIFICANT CHANGE UP (ref 80–100)
PHOSPHATE SERPL-MCNC: 3.4 MG/DL — SIGNIFICANT CHANGE UP (ref 2.5–4.5)
PLATELET # BLD AUTO: 223 K/UL — SIGNIFICANT CHANGE UP (ref 150–400)
POTASSIUM SERPL-MCNC: 4.8 MMOL/L — SIGNIFICANT CHANGE UP (ref 3.5–5.3)
POTASSIUM SERPL-SCNC: 4.8 MMOL/L — SIGNIFICANT CHANGE UP (ref 3.5–5.3)
PROTHROM AB SERPL-ACNC: 12.9 SEC — HIGH (ref 9.8–12.7)
RBC # BLD: 3.17 M/UL — LOW (ref 3.8–5.2)
RBC # FLD: 13.8 % — SIGNIFICANT CHANGE UP (ref 10.3–14.5)
SODIUM SERPL-SCNC: 137 MMOL/L — SIGNIFICANT CHANGE UP (ref 135–145)
WBC # BLD: 16.7 K/UL — HIGH (ref 3.8–10.5)
WBC # FLD AUTO: 16.7 K/UL — HIGH (ref 3.8–10.5)

## 2018-03-16 PROCEDURE — 11042 DBRDMT SUBQ TIS 1ST 20SQCM/<: CPT | Mod: GC

## 2018-03-16 PROCEDURE — 99233 SBSQ HOSP IP/OBS HIGH 50: CPT

## 2018-03-16 PROCEDURE — 11045 DBRDMT SUBQ TISS EACH ADDL: CPT | Mod: GC

## 2018-03-16 RX ORDER — SODIUM CHLORIDE 9 MG/ML
1000 INJECTION INTRAMUSCULAR; INTRAVENOUS; SUBCUTANEOUS
Qty: 0 | Refills: 0 | Status: DISCONTINUED | OUTPATIENT
Start: 2018-03-16 | End: 2018-03-18

## 2018-03-16 RX ORDER — MIDAZOLAM HYDROCHLORIDE 1 MG/ML
1 INJECTION, SOLUTION INTRAMUSCULAR; INTRAVENOUS ONCE
Qty: 0 | Refills: 0 | Status: DISCONTINUED | OUTPATIENT
Start: 2018-03-16 | End: 2018-03-16

## 2018-03-16 RX ORDER — FENTANYL CITRATE 50 UG/ML
25 INJECTION INTRAVENOUS ONCE
Qty: 0 | Refills: 0 | Status: DISCONTINUED | OUTPATIENT
Start: 2018-03-16 | End: 2018-03-16

## 2018-03-16 RX ORDER — SODIUM CHLORIDE 9 MG/ML
500 INJECTION INTRAMUSCULAR; INTRAVENOUS; SUBCUTANEOUS ONCE
Qty: 0 | Refills: 0 | Status: COMPLETED | OUTPATIENT
Start: 2018-03-16 | End: 2018-03-16

## 2018-03-16 RX ADMIN — MORPHINE SULFATE 2 MILLIGRAM(S): 50 CAPSULE, EXTENDED RELEASE ORAL at 11:00

## 2018-03-16 RX ADMIN — MEROPENEM 100 MILLIGRAM(S): 1 INJECTION INTRAVENOUS at 06:27

## 2018-03-16 RX ADMIN — Medication 100 MILLIGRAM(S): at 15:19

## 2018-03-16 RX ADMIN — INSULIN GLARGINE 6 UNIT(S): 100 INJECTION, SOLUTION SUBCUTANEOUS at 21:58

## 2018-03-16 RX ADMIN — Medication 2: at 23:02

## 2018-03-16 RX ADMIN — FENTANYL CITRATE 25 MICROGRAM(S): 50 INJECTION INTRAVENOUS at 15:52

## 2018-03-16 RX ADMIN — MIDODRINE HYDROCHLORIDE 10 MILLIGRAM(S): 2.5 TABLET ORAL at 21:58

## 2018-03-16 RX ADMIN — VASOPRESSIN 2.4 UNIT(S)/MIN: 20 INJECTION INTRAVENOUS at 06:28

## 2018-03-16 RX ADMIN — SODIUM CHLORIDE 50 MILLILITER(S): 9 INJECTION INTRAMUSCULAR; INTRAVENOUS; SUBCUTANEOUS at 10:21

## 2018-03-16 RX ADMIN — MIDODRINE HYDROCHLORIDE 10 MILLIGRAM(S): 2.5 TABLET ORAL at 15:19

## 2018-03-16 RX ADMIN — MORPHINE SULFATE 2 MILLIGRAM(S): 50 CAPSULE, EXTENDED RELEASE ORAL at 19:00

## 2018-03-16 RX ADMIN — Medication 3: at 06:27

## 2018-03-16 RX ADMIN — HEPARIN SODIUM 5000 UNIT(S): 5000 INJECTION INTRAVENOUS; SUBCUTANEOUS at 18:52

## 2018-03-16 RX ADMIN — Medication 100 MILLIGRAM(S): at 06:26

## 2018-03-16 RX ADMIN — HEPARIN SODIUM 5000 UNIT(S): 5000 INJECTION INTRAVENOUS; SUBCUTANEOUS at 06:26

## 2018-03-16 RX ADMIN — Medication 2: at 11:22

## 2018-03-16 RX ADMIN — Medication 100 MILLIGRAM(S): at 21:58

## 2018-03-16 RX ADMIN — MORPHINE SULFATE 2 MILLIGRAM(S): 50 CAPSULE, EXTENDED RELEASE ORAL at 18:51

## 2018-03-16 RX ADMIN — MIDODRINE HYDROCHLORIDE 10 MILLIGRAM(S): 2.5 TABLET ORAL at 06:27

## 2018-03-16 RX ADMIN — MIDAZOLAM HYDROCHLORIDE 1 MILLIGRAM(S): 1 INJECTION, SOLUTION INTRAMUSCULAR; INTRAVENOUS at 15:19

## 2018-03-16 RX ADMIN — MEROPENEM 100 MILLIGRAM(S): 1 INJECTION INTRAVENOUS at 18:49

## 2018-03-16 RX ADMIN — FENTANYL CITRATE 25 MICROGRAM(S): 50 INJECTION INTRAVENOUS at 15:22

## 2018-03-16 RX ADMIN — PANTOPRAZOLE SODIUM 40 MILLIGRAM(S): 20 TABLET, DELAYED RELEASE ORAL at 11:22

## 2018-03-16 RX ADMIN — SODIUM CHLORIDE 1000 MILLILITER(S): 9 INJECTION INTRAMUSCULAR; INTRAVENOUS; SUBCUTANEOUS at 09:17

## 2018-03-16 RX ADMIN — MORPHINE SULFATE 2 MILLIGRAM(S): 50 CAPSULE, EXTENDED RELEASE ORAL at 15:19

## 2018-03-16 RX ADMIN — MORPHINE SULFATE 2 MILLIGRAM(S): 50 CAPSULE, EXTENDED RELEASE ORAL at 21:30

## 2018-03-16 RX ADMIN — MORPHINE SULFATE 2 MILLIGRAM(S): 50 CAPSULE, EXTENDED RELEASE ORAL at 15:49

## 2018-03-16 RX ADMIN — MORPHINE SULFATE 2 MILLIGRAM(S): 50 CAPSULE, EXTENDED RELEASE ORAL at 06:28

## 2018-03-16 RX ADMIN — MORPHINE SULFATE 2 MILLIGRAM(S): 50 CAPSULE, EXTENDED RELEASE ORAL at 21:57

## 2018-03-16 RX ADMIN — MORPHINE SULFATE 2 MILLIGRAM(S): 50 CAPSULE, EXTENDED RELEASE ORAL at 06:29

## 2018-03-16 RX ADMIN — MORPHINE SULFATE 2 MILLIGRAM(S): 50 CAPSULE, EXTENDED RELEASE ORAL at 10:21

## 2018-03-16 NOTE — PROGRESS NOTE ADULT - ASSESSMENT
Stage 4 sacral decub ulcer    Bedside debridement today - necrotic tissues excised. Ulcer size approx 10 cm lat by 12 cm craniocaudal. Irrigated and sponged with betadine. Culture collected. Packed with betadine gauze.

## 2018-03-16 NOTE — PROGRESS NOTE ADULT - SUBJECTIVE AND OBJECTIVE BOX
INTERVAL HPI/OVERNIGHT EVENTS: ***    PRESSORS: [x ] YES [ ] NO  WHICH:levophed and vaso-pressin    ANTIBIOTICS:    clinda              DATE STARTED:3/14  ANTIBIOTICS:    myriam              DATE STARTED:3/11  ANTIBIOTICS:                  DATE STARTED:    Antimicrobial:  clindamycin IVPB 900 milliGRAM(s) IV Intermittent every 8 hours  clindamycin IVPB      meropenem  IVPB 500 milliGRAM(s) IV Intermittent every 12 hours    Cardiovascular:  midodrine 10 milliGRAM(s) Oral every 8 hours  norepinephrine Infusion 0.5 MICROgram(s)/kG/Min IV Continuous <Continuous>    Pulmonary:    Hematalogic:  heparin  Injectable 5000 Unit(s) SubCutaneous every 12 hours    Other:  chlorhexidine 2% Cloths 1 Application(s) Topical daily  dextrose 50% Injectable 12.5 Gram(s) IV Push once  dextrose 50% Injectable 25 Gram(s) IV Push once  dextrose 50% Injectable 25 Gram(s) IV Push once  dextrose Gel 1 Dose(s) Oral once PRN  glucagon  Injectable 1 milliGRAM(s) IntraMuscular once PRN  insulin glargine Injectable (LANTUS) 6 Unit(s) SubCutaneous at bedtime  insulin lispro (HumaLOG) corrective regimen sliding scale   SubCutaneous every 6 hours  morphine  - Injectable 2 milliGRAM(s) IV Push every 4 hours  pantoprazole  Injectable 40 milliGRAM(s) IV Push daily  vasopressin Infusion 0.04 Unit(s)/Min IV Continuous <Continuous>    chlorhexidine 2% Cloths 1 Application(s) Topical daily  clindamycin IVPB 900 milliGRAM(s) IV Intermittent every 8 hours  clindamycin IVPB      dextrose 50% Injectable 12.5 Gram(s) IV Push once  dextrose 50% Injectable 25 Gram(s) IV Push once  dextrose 50% Injectable 25 Gram(s) IV Push once  dextrose Gel 1 Dose(s) Oral once PRN  glucagon  Injectable 1 milliGRAM(s) IntraMuscular once PRN  heparin  Injectable 5000 Unit(s) SubCutaneous every 12 hours  insulin glargine Injectable (LANTUS) 6 Unit(s) SubCutaneous at bedtime  insulin lispro (HumaLOG) corrective regimen sliding scale   SubCutaneous every 6 hours  meropenem  IVPB 500 milliGRAM(s) IV Intermittent every 12 hours  midodrine 10 milliGRAM(s) Oral every 8 hours  morphine  - Injectable 2 milliGRAM(s) IV Push every 4 hours  norepinephrine Infusion 0.5 MICROgram(s)/kG/Min IV Continuous <Continuous>  pantoprazole  Injectable 40 milliGRAM(s) IV Push daily  vasopressin Infusion 0.04 Unit(s)/Min IV Continuous <Continuous>    Drug Dosing Weight  Height (cm): 124.46 (16 May 2017 20:03)  Weight (kg): 46.4 (15 Mar 2018 08:00)  BMI (kg/m2): 30 (15 Mar 2018 08:00)  BSA (m2): 1.21 (15 Mar 2018 08:00)    CENTRAL LINE: [x ] YES [ ] NO  LOCATION: [facility]  DATE INSERTED: 03/12/2018  REMOVE: [ ] YES [ ] NO  EXPLAIN:    BANKS: [ x] YES [ ] NO    DATE INSERTED:  REMOVE:  [ ] YES [ ] NO  EXPLAIN:    A-LINE:  [ ] YES [x ] NO  LOCATION:   DATE INSERTED:  REMOVE:  [ ] YES [ ] NO  EXPLAIN:    PMH -reviewed admission note, no change since admission  Heart faliure: acute [ ] chronic [ ] acute or chronic [ ] diastolic [ ] systolic [ ] combied systolic and diastolic[ ]  MALIKA: ATN[ ] renal medullary necrosis [ ] CKD I [ ]CKDII [ ]CKD III [ ]CKD IV [ ]CKD V [ ]Other pathological lesions [ ]  Abdominal Nutrition Status: malnutrition [ ] cachexia [ ] morbid obesity/BMI=40 [ ] Supplement ordered [___________]     ICU Vital Signs Last 24 Hrs  T(C): 36.2 (16 Mar 2018 07:00), Max: 36.9 (15 Mar 2018 13:00)  T(F): 97.2 (16 Mar 2018 07:00), Max: 98.5 (15 Mar 2018 13:00)  HR: 79 (16 Mar 2018 07:30) (65 - 97)  BP: 95/79 (16 Mar 2018 07:30) (81/71 - 112/74)  BP(mean): 83 (16 Mar 2018 07:30) (65 - 90)  ABP: --  ABP(mean): --  RR: 16 (16 Mar 2018 07:30) (0 - 26)  SpO2: 96% (16 Mar 2018 07:30) (90% - 100%)            03-15 @ 07:01  -  03-16 @ 07:00  --------------------------------------------------------  IN: 1391.6 mL / OUT: 690 mL / NET: 701.6 mL            PHYSICAL EXAM:    GENERAL: [x ]NAD, [x ]well-groomed, [x ]well-developed  HEAD:  [ x]Atraumatic, [x ]Normocephalic  EYES: [x ]EOMI, [x ]PERRLA, [x ]conjunctiva and sclera clear  ENMT: [x ]No tonsillar erythema, exudates, or enlargement; [x ]Moist mucous membranes, [ ]Good dentition, [ ]No lesions  NECK: [x ]Supple, normal appearance, [x ]No JVD; [x ]Normal thyroid; [ ]Trachea midline  NERVOUS SYSTEM:  [x ]Alert & Oriented X0, [x] unable to access strength  CHEST/LUNG: [x]No chest deformity; [x]Normal percussion bilaterally; [ ]No rales, rhonchi, wheezing   HEART: [x]Regular rate and rhythm; [x]No murmurs, rubs, or gallops  ABDOMEN: [x]Soft, Nontender, Nondistended; [ ]Bowel sounds present  EXTREMITIES:  [x]2+ Peripheral Pulses, [ ]No clubbing, cyanosis, or edema  LYMPH: [x]No lymphadenopathy noted  SKIN: [x]No rashes or lesions; [ ]Good capillary refill  BACK: 8x10 decubitus ulcer with draining pus noted       LABS:  CBC Full  -  ( 16 Mar 2018 07:17 )  WBC Count : 16.7 K/uL  Hemoglobin : 8.3 g/dL  Hematocrit : 26.1 %  Platelet Count - Automated : 223 K/uL  Mean Cell Volume : 82.3 fl  Mean Cell Hemoglobin : 26.1 pg  Mean Cell Hemoglobin Concentration : 31.7 gm/dL  Auto Neutrophil # : x  Auto Lymphocyte # : x  Auto Monocyte # : x  Auto Eosinophil # : x  Auto Basophil # : x  Auto Neutrophil % : x  Auto Lymphocyte % : x  Auto Monocyte % : x  Auto Eosinophil % : x  Auto Basophil % : x    03-16    137  |  108  |  27<H>  ----------------------------<  218<H>  4.8   |  21<L>  |  0.64    Ca    8.2<L>      16 Mar 2018 07:17  Phos  3.4     03-16  Mg     2.0     03-16      PT/INR - ( 16 Mar 2018 07:17 )   PT: 12.9 sec;   INR: 1.18 ratio                 RADIOLOGY & ADDITIONAL STUDIES REVIEWED:  ***    [ ]GOALS OF CARE DISCUSSION WITH PATIENT/FAMILY/PROXY:    CRITICAL CARE TIME SPENT: 35 minutes

## 2018-03-16 NOTE — PROGRESS NOTE ADULT - PROBLEM SELECTOR PLAN 1
2/2 UTI/sacral ulcer. Pt continues pressors and IV abx; on ATC morphine for pain management. Plan for debridement at bedside today per report. LM for dtr/Ruba - pt now eligible for IPU if family agreeable. 2/2 UTI/sacral ulcer. Pt continues pressors and IV abx; on ATC morphine for pain management. Dtr wants to continue with planned debridement at bedside today. Spoke with pt's dtr, Ruba, and other children - they are aware pt could benefit from IPU. Agreeable with no escalation of care and likely IPU Monday. DNR/DNI on file.

## 2018-03-16 NOTE — PROGRESS NOTE ADULT - PROBLEM SELECTOR PLAN 4
2/2 end stage dementia. Pt bedbound, dependent on all ADLs, nonverbal, unable to follow commands. Pt with significant skin failure. Pt now eligible for IPU.

## 2018-03-16 NOTE — PROGRESS NOTE ADULT - SUBJECTIVE AND OBJECTIVE BOX
Patient is seen and examined at the bed side, is afebrile. She remains  on pressors. The WBC count continues trending down. She is s/p debridement of sacral ulcer and tolerated the procedure well. The repeat blood cultures are negative.        REVIEW OF SYSTEMS: Unable to obtain since patient is nonverbal         ICU Vital Signs Last 24 Hrs  T(C): 36.2 (16 Mar 2018 15:34), Max: 36.2 (16 Mar 2018 06:00)  T(F): 97.2 (16 Mar 2018 15:34), Max: 97.2 (16 Mar 2018 06:00)  HR: 74 (16 Mar 2018 21:00) (59 - 83)  BP: 99/57 (16 Mar 2018 21:00) (71/49 - 115/80)  BP(mean): 71 (16 Mar 2018 21:00) (57 - 101)  ABP: --  ABP(mean): --  RR: 15 (16 Mar 2018 21:00) (0 - 21)  SpO2: 99% (16 Mar 2018 12:00) (89% - 100%)          ALLERGIES: NKDA          PHYSICAL EXAM:  GENERAL: Not in  distress  CVS: s1 and s2 present  RESP: Air entry B/L  GI: Abdomen nondistended and nontender  BACK: Stage IV  decubital ulcer bandage over it  EXT: No pedal edema   CNS: Non verbal          LABS:                        8.3    16.7  )-----------( 223      ( 16 Mar 2018 07:17 )             26.1                           9.4    19.9  )-----------( 256      ( 15 Mar 2018 06:43 )             30.6                10.1   25.8  )-----------( 354      ( 12 Mar 2018 06:44 )             34.3                03-16    137  |  108  |  27<H>  ----------------------------<  218<H>  4.8   |  21<L>  |  0.64    Ca    8.2<L>      16 Mar 2018 07:17  Phos  3.4     03-16  Mg     2.0     03-16      03-15    141  |  113<H>  |  23<H>  ----------------------------<  260<H>  4.5   |  19<L>  |  0.78    Ca    8.0<L>      15 Mar 2018 06:43  Phos  2.4     03-15  Mg     1.9     03-15      TPro  6.5  /  Alb  1.6<L>  /  TBili  0.2  /  DBili  x   /  AST  28  /  ALT  29  /  AlkPhos  69  03-11             Urinalysis Basic - ( 11 Mar 2018 13:40 )    Color: Yellow / Appearance: Turbid / S.010 / pH: x  Gluc: x / Ketone: Negative  / Bili: Small / Urobili: 1   Blood: x / Protein: 100 / Nitrite: Positive   Leuk Esterase: Moderate / RBC: 5-10 /HPF / WBC >50 /HPF   Sq Epi: x / Non Sq Epi: Occasional /HPF / Bacteria: TNTC /HPF      CAPILLARY BLOOD GLUCOSE      POCT Blood Glucose.: 100 mg/dL (11 Mar 2018 19:52)  POCT Blood Glucose.: 125 mg/dL (11 Mar 2018 18:56)  POCT Blood Glucose.: 167 mg/dL (11 Mar 2018 18:02)  POCT Blood Glucose.: 222 mg/dL (11 Mar 2018 17:14)  POCT Blood Glucose.: 200 mg/dL (11 Mar 2018 17:12)  POCT Blood Glucose.: 422 mg/dL (11 Mar 2018 15:02)  POCT Blood Glucose.: 467 mg/dL (11 Mar 2018 13:41)  POCT Blood Glucose.: 458 mg/dL (11 Mar 2018 11:42)    ABG - ( 11 Mar 2018 12:52 )  pH: 7.28  /  pCO2: 32    /  pO2: 49    / HCO3: 14    / Base Excess: -11.2 /  SaO2: 78            MEDICATIONS  (STANDING):  chlorhexidine 2% Cloths 1 Application(s) Topical daily  clindamycin IVPB 900 milliGRAM(s) IV Intermittent every 8 hours  clindamycin IVPB      dextrose 50% Injectable 12.5 Gram(s) IV Push once  dextrose 50% Injectable 25 Gram(s) IV Push once  dextrose 50% Injectable 25 Gram(s) IV Push once  heparin  Injectable 5000 Unit(s) SubCutaneous every 12 hours  insulin glargine Injectable (LANTUS) 6 Unit(s) SubCutaneous at bedtime  insulin lispro (HumaLOG) corrective regimen sliding scale   SubCutaneous every 6 hours  meropenem  IVPB 500 milliGRAM(s) IV Intermittent every 12 hours  midodrine 10 milliGRAM(s) Oral every 8 hours  morphine  - Injectable 2 milliGRAM(s) IV Push every 4 hours  norepinephrine Infusion 0.5 MICROgram(s)/kG/Min (19.125 mL/Hr) IV Continuous <Continuous>  pantoprazole  Injectable 40 milliGRAM(s) IV Push daily  sodium chloride 0.9%. 1000 milliLiter(s) (50 mL/Hr) IV Continuous <Continuous>    MEDICATIONS  (PRN):  dextrose Gel 1 Dose(s) Oral once PRN Blood Glucose LESS THAN 70 milliGRAM(s)/deciliter  glucagon  Injectable 1 milliGRAM(s) IntraMuscular once PRN Glucose LESS THAN 70 milligrams/deciliter            RADIOLOGY & ADDITIONAL TESTS:    3/11/18 CT Abdomen and Pelvis No Cont (18 @ 15:51) Sacral decubitus ulcer with suspected osteomyelitis and possible intramuscular left gluteal abscesses.          MICROBIOLOGY DATA:    Culture - Blood (03.15.18 @ 11:15)    Specimen Source: .Blood Blood-Peripheral    Culture Results:   No growth to date.    Culture - Blood (03.15.18 @ 11:14)    Specimen Source: .Blood Blood-Peripheral    Culture Results:   No growth to date.        Culture - Blood (18 @ 21:58)    Gram Stain:   Growth in anaerobic bottle: Gram Negative Rods    Specimen Source: .Blood Blood-Peripheral    Culture Results:   Growth in anaerobic bottle: Clostridium ramosum  "Susceptibilities not performed"        Culture - Urine (18 @ 21:38)    -  Amikacin: S <=8    -  Ampicillin: S <=2    -  Ampicillin/Sulbactam: S <=4/2    -  Aztreonam: S <=4    -  Cefazolin: S <=2    -  Cefepime: S <=2    -  Cefoxitin: S <=4    -  Ceftazidime: S <=1    -  Ceftriaxone: S <=1    -  Ciprofloxacin: R >2    -  Ertapenem: S <=0.5    -  Gentamicin: S 2    -  Levofloxacin: R >4    -  Meropenem: S <=1    -  Nitrofurantoin: R 64    -  Piperacillin/Tazobactam: S <=8    -  Tobramycin: S <=2    -  Trimethoprim/Sulfamethoxazole: R >    Specimen Source: .Urine Catheterized    Culture Results:   >100,000 CFU/ml Proteus mirabilis    Organism Identification: Proteus mirabilis    Organism: Proteus mirabilis    Method Type: RONNA          Culture - Blood (18 @ 21:58)    -  Multidrug (KPC pos) resistant organism: Nondet    -  Staphylococcus aureus: Nondet    -  Methicillin resistant Staphylococcus aureus (MRSA): Nondet    -  Coagulase negative Staphylococcus: Nondet    -  Enterococcus species: Nondet    -  Vancomycin resistant Enterococcus sp.: Nondet    -  Escherichia coli: Nondet    -  Klebsiella oxytoca: Nondet    -  Klebsiella pneumoniae: Nondet    -  Serratia marcescens: Nondet    -  Haemophilus influenzae: Nondet    -  Listeria monocytogenes: Nondet    -  Neisseria meningitidis: Nondet    -  Pseudomonas aeruginosa: Nondet    -  Acinetobacter baumanii: Nondet    -  Enterobacter cloacae complex: Nondet    -  Streptococcus sp. (Not Grp A, B or S pneumoniae): Nondet    -  Streptococcus agalactiae (Group B): Nondet    -  Streptococcus pyogenes (Group A): Nondet    -  Streptococcus pneumoniae: Nondet    -  Candida albicans: Nondet    -  Candida glabrata: Nondet    -  Candida krusei: Nondet  Culture - Blood (18 @ 21:58)    Gram Stain:   Growth in anaerobic bottle: Gram Negative Rods    Specimen Source: .Blood Blood-Peripheral    Culture Results:   Growth in anaerobic bottle: Gram Negative Rods      -  Candida parapsilosis: Nondet    -  Candida tropicalis: Nondet    Gram Stain:   Growth in anaerobic bottle: Gram Negative Rods    Specimen Source: .Blood Blood-Peripheral    Organism: Blood Culture PCR    Culture Results:   Growth in anaerobic bottle: Gram Negative Rods  "Due to technical problems, Proteus sp. will Not be reported as part of  the BCID panel until further notice"  ***Blood Panel PCR results on this specimen are available  approximately 3 hours after the Gram stain result.***  Gram stain, PCR, and/or culture results may not always  correspond due to difference in methodologies.  ************************************************************  This PCR assay was performed using [employer].  The following targets are tested for: Enterococcus,  vancomycin resistant enterococci, Listeria monocytogenes,  coagulase negative staphylococci, S. aureus,  methicillin resistant S. aureus, Streptococcus agalactiae  (Group B), S. pneumoniae, S. pyogenes (Group A),  Acinetobacter baumannii, Enterobacter cloacae, E. coli,  Klebsiella oxytoca, K. pneumoniae, Proteus sp.,  Serratia marcescens, Haemophilus influenzae,  Neisseria meningitidis, Pseudomonas aeruginosa, Candida  albicans, C. glabrata, C krusei, C parapsilosis,  C. tropicalis and the KPC resistance gene.    Organism Identification: Blood Culture PCR    Method Type: PCR    Rapid Respiratory Viral Panel (18 @ 14:03)    Rapid RVP Result: NotDetec: The [employer] RVP Rapid uses polymerase chain reaction (PCR) and melt  curve analysis to screen for adenovirus; coronavirus HKU1, NL63, 229E,  OC43; human metapneumovirus (hMPV); human enterovirus/rhinovirus  (Entero/RV); influenza A; influenza A/H1;influenza A/H3; influenza  A/H1-2009; influenza B; parainfluenza viruses 1, 2, 3, 4; respiratory  syncytial virus; Bordetella pertussis; Mycoplasma pneumoniae; and  Chlamydophila pneumoniae.

## 2018-03-16 NOTE — PROGRESS NOTE ADULT - PROBLEM SELECTOR PLAN 5
Left message for patient's daughter, Ruba - request return call to further discuss pt's status, and now eligible for IPU. Will continue to f/u. Met with pt's dtrRuba at bedside; spoke with pt's children: Juan Hanson Yuanita on the phone. LM for pt's son Reagan. Updated status. Family aware pt eligible for IPU. All agreeable with no escalation of care. Want time to bring pt's  to hospital tomorrow. Likely for IPU Monday.  Updated DNR form to include "no escalation of care" per family's wishes. Met with pt's dtrRuba at bedside; spoke with pt's children: Juan Hanson, Carmencita, Reagan on the phone. Updated status. Family aware pt eligible for IPU. All agreeable with no escalation of care, don't want "to prolong her suffering." Want time to bring pt's  to hospital tomorrow. Likely for IPU Monday.  Updated DNR form to include "no escalation of care" per family's wishes.

## 2018-03-16 NOTE — PROCEDURE NOTE - PROCEDURE
<<-----Click on this checkbox to enter Procedure Excisional debridement  03/16/2018    Active  [id number]

## 2018-03-16 NOTE — PROCEDURE NOTE - NSEQUIPUSED_SKIN_A_CORE
gloves/antiseptic cleaning solution/forceps/suture scissors/gauze pads/normal saline solution/povidone-iodine pads

## 2018-03-16 NOTE — PROCEDURE NOTE - NSINDICATIONS_GEN_A_CORE
venous access
devitalized or nonviable tissue at the level of:/prepare site for appropriate surgical interventions to optimize wound healing
venous access

## 2018-03-16 NOTE — PROGRESS NOTE ADULT - PROBLEM SELECTOR PLAN 1
Secondary to UTI and infected sacral ulcer   • continue with  meropenem [renally adjusted, allergic to pencillin] and started on vanco for clostridium ramosum  clindamycin added as per ID recommendation for unknown organism in   - possible gluteal abcess noted on CT abdomen pelvis  blood culture gram negative rods in anaerobic bottle- clostridium ramosum and urine culture shows proteus - follow final results  - bedside debridement this afternoon

## 2018-03-16 NOTE — PROGRESS NOTE ADULT - SUBJECTIVE AND OBJECTIVE BOX
OVERNIGHT EVENTS: continues IV morphine ATC; on pressors    Present Symptoms:   Review of Systems: Unable to obtain due to poor mentation    MEDICATIONS  (STANDING):  chlorhexidine 2% Cloths 1 Application(s) Topical daily  clindamycin IVPB 900 milliGRAM(s) IV Intermittent every 8 hours  clindamycin IVPB      dextrose 50% Injectable 12.5 Gram(s) IV Push once  dextrose 50% Injectable 25 Gram(s) IV Push once  dextrose 50% Injectable 25 Gram(s) IV Push once  heparin  Injectable 5000 Unit(s) SubCutaneous every 12 hours  insulin glargine Injectable (LANTUS) 6 Unit(s) SubCutaneous at bedtime  insulin lispro (HumaLOG) corrective regimen sliding scale   SubCutaneous every 6 hours  meropenem  IVPB 500 milliGRAM(s) IV Intermittent every 12 hours  midodrine 10 milliGRAM(s) Oral every 8 hours  morphine  - Injectable 2 milliGRAM(s) IV Push every 4 hours  norepinephrine Infusion 0.5 MICROgram(s)/kG/Min (19.125 mL/Hr) IV Continuous <Continuous>  pantoprazole  Injectable 40 milliGRAM(s) IV Push daily  sodium chloride 0.9%. 1000 milliLiter(s) (50 mL/Hr) IV Continuous <Continuous>  vasopressin Infusion 0.04 Unit(s)/Min (2.4 mL/Hr) IV Continuous <Continuous>    MEDICATIONS  (PRN):  dextrose Gel 1 Dose(s) Oral once PRN Blood Glucose LESS THAN 70 milliGRAM(s)/deciliter  glucagon  Injectable 1 milliGRAM(s) IntraMuscular once PRN Glucose LESS THAN 70 milligrams/deciliter      PHYSICAL EXAM:  Vital Signs Last 24 Hrs  T(C): 35.9 (16 Mar 2018 11:00), Max: 36.9 (15 Mar 2018 13:00)  T(F): 96.7 (16 Mar 2018 11:00), Max: 98.5 (15 Mar 2018 13:00)  HR: 59 (16 Mar 2018 11:00) (59 - 96)  BP: 90/76 (16 Mar 2018 11:00) (81/71 - 115/80)  BP(mean): 82 (16 Mar 2018 11:00) (64 - 101)  RR: 9 (16 Mar 2018 11:00) (0 - 26)  SpO2: 99% (16 Mar 2018 11:00) (89% - 100%)  General: awake, not oriented, nonverbal, unable to follow commands   Karnofsky Performance Score/Palliative Performance Status Version2:   20 %    HEENT: dry mouth, + temporal wasting, NGT  Lungs: decreased resp  CV:   normal  GI:   incontinent, NGT  : incontinent  Musculoskeletal: bedbound, dependent on ADLs at baseline, loss of muscle mass/subcutaneous fat  Skin: DTI to the R. 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) with bone, pink tissue, necrotic tissue, slough, purulent drainage, odor, and undermining 	  Neuro: awake, not oriented, nonverbal, unable to follow commands, + dysphagia, + mittens  Diet: NPO - NGT     LABS:                          8.3    16.7  )-----------( 223      ( 16 Mar 2018 07:17 )             26.1     03-16    137  |  108  |  27<H>  ----------------------------<  218<H>  4.8   |  21<L>  |  0.64    Ca    8.2<L>      16 Mar 2018 07:17  Phos  3.4     03-16  Mg     2.0     03-16          RADIOLOGY & ADDITIONAL STUDIES: reviewed    ADVANCE DIRECTIVES: DNR / MOLST forms previously completed as per family's wishes: DNR / DNI / trial feeding tube / trial IV fluids / use abx. No PEG  Advanced Care Planning discussion total time spent: OVERNIGHT EVENTS: continues IV morphine ATC; on pressors    Present Symptoms:   Review of Systems: Unable to obtain due to poor mentation    MEDICATIONS  (STANDING):  chlorhexidine 2% Cloths 1 Application(s) Topical daily  clindamycin IVPB 900 milliGRAM(s) IV Intermittent every 8 hours  clindamycin IVPB      dextrose 50% Injectable 12.5 Gram(s) IV Push once  dextrose 50% Injectable 25 Gram(s) IV Push once  dextrose 50% Injectable 25 Gram(s) IV Push once  heparin  Injectable 5000 Unit(s) SubCutaneous every 12 hours  insulin glargine Injectable (LANTUS) 6 Unit(s) SubCutaneous at bedtime  insulin lispro (HumaLOG) corrective regimen sliding scale   SubCutaneous every 6 hours  meropenem  IVPB 500 milliGRAM(s) IV Intermittent every 12 hours  midodrine 10 milliGRAM(s) Oral every 8 hours  morphine  - Injectable 2 milliGRAM(s) IV Push every 4 hours  norepinephrine Infusion 0.5 MICROgram(s)/kG/Min (19.125 mL/Hr) IV Continuous <Continuous>  pantoprazole  Injectable 40 milliGRAM(s) IV Push daily  sodium chloride 0.9%. 1000 milliLiter(s) (50 mL/Hr) IV Continuous <Continuous>  vasopressin Infusion 0.04 Unit(s)/Min (2.4 mL/Hr) IV Continuous <Continuous>    MEDICATIONS  (PRN):  dextrose Gel 1 Dose(s) Oral once PRN Blood Glucose LESS THAN 70 milliGRAM(s)/deciliter  glucagon  Injectable 1 milliGRAM(s) IntraMuscular once PRN Glucose LESS THAN 70 milligrams/deciliter      PHYSICAL EXAM:  Vital Signs Last 24 Hrs  T(C): 35.9 (16 Mar 2018 11:00), Max: 36.9 (15 Mar 2018 13:00)  T(F): 96.7 (16 Mar 2018 11:00), Max: 98.5 (15 Mar 2018 13:00)  HR: 59 (16 Mar 2018 11:00) (59 - 96)  BP: 90/76 (16 Mar 2018 11:00) (81/71 - 115/80)  BP(mean): 82 (16 Mar 2018 11:00) (64 - 101)  RR: 9 (16 Mar 2018 11:00) (0 - 26)  SpO2: 99% (16 Mar 2018 11:00) (89% - 100%)  General: awake, not oriented, nonverbal, unable to follow commands   Karnofsky Performance Score/Palliative Performance Status Version2:   20 %    HEENT: dry mouth, + temporal wasting, NGT  Lungs: decreased resp  CV:   normal  GI:   incontinent, NGT  : incontinent  Musculoskeletal: bedbound, dependent on ADLs at baseline, loss of muscle mass/subcutaneous fat  Skin: DTI to the R. 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) with bone, pink tissue, necrotic tissue, slough, purulent drainage, odor, and undermining 	  Neuro: awake, not oriented, nonverbal, unable to follow commands, + dysphagia, + mittens  Diet: NPO - NGT     LABS:                          8.3    16.7  )-----------( 223      ( 16 Mar 2018 07:17 )             26.1     03-16    137  |  108  |  27<H>  ----------------------------<  218<H>  4.8   |  21<L>  |  0.64    Ca    8.2<L>      16 Mar 2018 07:17  Phos  3.4     03-16  Mg     2.0     03-16          RADIOLOGY & ADDITIONAL STUDIES: reviewed    ADVANCE DIRECTIVES: DNR / MOLST forms previously completed as per family's wishes: DNR / DNI / trial feeding tube / trial IV fluids / use abx. No PEG

## 2018-03-16 NOTE — PROGRESS NOTE ADULT - ASSESSMENT
Patient is a 83y old  Female with multiple co-morbidities including  Dementia, bedbound  and DM2, has sent in to the ER from Nursing Home for evaluation of acute renal failure with hyperkalemia, hyperosmolar hyperglycemic state secondary to diabetes and sepsis (11 Mar 2018 14:55). As per daughters at bedside she has been getting progressively less interactive in the past month and has been eating very little.  In the ER, she found to be afebrile but tachycardic and hypotensive to 90/56. The labs significant for positive Urine analysis, Leukocytosis, lactate of 7.3 and  Blood glucose of 584, elevated creatinine  of 5.15. 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 She has admitted to ICU since requiring pressor and started on broad spectrum antibiotics, cultures pending. The ID consult requested to assist with further evaluation and antibiotic  management.    # Septic shock  # GRAM negative bacteremia- Clostridium ramosum  # Infected Decubitus ulcer with Left gluteal  Abscess and Osteomyelitis- No surgical debridement as per Surgery  # UTI - proteus    Would recommend:  1. Follow up sacral ulcer cultures   2. Continue  Clindamycin as a antitoxin in the setting of Clostridium bacteremia  3. Continue  Meropenem to cover Clostridium and Proteus  4. Monitor WBC count, is trending down  5. Titrate down pressors as tolerated  6. Continue Wound care     d/w ICU team    will follow the patient with you

## 2018-03-16 NOTE — PROCEDURE NOTE - NSSITEPREP_SKIN_A_CORE
chlorhexidine
chlorhexidine
alcohol
povidone iodine (if allergic to chlorhexidine)/Adherence to aseptic technique: hand hygiene prior to donning barriers (gown, gloves), don cap and mask, sterile drape over patient

## 2018-03-16 NOTE — PROGRESS NOTE ADULT - SUBJECTIVE AND OBJECTIVE BOX
GENERAL SURGERY - PROGRESS NOTE    INTERVAL HPI/OVERNIGHT EVENTS: Discussed again w ICU for debridement of sacral decub. Patient on pressors x 2 now, unclear source of sepsis, per ICU/ID. Patient is not communicative. All info from chart and report.    ====================================  MEDICATIONS  (STANDING):  chlorhexidine 2% Cloths 1 Application(s) Topical daily  clindamycin IVPB 900 milliGRAM(s) IV Intermittent every 8 hours  clindamycin IVPB      dextrose 50% Injectable 12.5 Gram(s) IV Push once  dextrose 50% Injectable 25 Gram(s) IV Push once  dextrose 50% Injectable 25 Gram(s) IV Push once  heparin  Injectable 5000 Unit(s) SubCutaneous every 12 hours  insulin glargine Injectable (LANTUS) 6 Unit(s) SubCutaneous at bedtime  insulin lispro (HumaLOG) corrective regimen sliding scale   SubCutaneous every 6 hours  meropenem  IVPB 500 milliGRAM(s) IV Intermittent every 12 hours  midodrine 10 milliGRAM(s) Oral every 8 hours  morphine  - Injectable 2 milliGRAM(s) IV Push every 4 hours  norepinephrine Infusion 0.5 MICROgram(s)/kG/Min (19.125 mL/Hr) IV Continuous <Continuous>  pantoprazole  Injectable 40 milliGRAM(s) IV Push daily  sodium chloride 0.9%. 1000 milliLiter(s) (50 mL/Hr) IV Continuous <Continuous>  vasopressin Infusion 0.04 Unit(s)/Min (2.4 mL/Hr) IV Continuous <Continuous>    MEDICATIONS  (PRN):  dextrose Gel 1 Dose(s) Oral once PRN Blood Glucose LESS THAN 70 milliGRAM(s)/deciliter  glucagon  Injectable 1 milliGRAM(s) IntraMuscular once PRN Glucose LESS THAN 70 milligrams/deciliter    ====================================  Vital Signs Last 24 Hrs  T(C): 35.9 (16 Mar 2018 11:00), Max: 36.6 (15 Mar 2018 16:30)  T(F): 96.7 (16 Mar 2018 11:00), Max: 97.9 (15 Mar 2018 16:30)  HR: 64 (16 Mar 2018 13:00) (59 - 96)  BP: 93/57 (16 Mar 2018 13:00) (71/49 - 115/80)  BP(mean): 70 (16 Mar 2018 13:00) (57 - 101)  RR: 15 (16 Mar 2018 13:00) (0 - 21)  SpO2: 99% (16 Mar 2018 12:00) (89% - 100%)  ====================================  PHYSICAL EXAM:  Gen: eyes open, does not respond to commands  Skin: large sacral decub ulcer with exposed sacrum and coccyx. Malodor but no purulence. NEcrotic debris noted.  ====================================  I&O's Detail    15 Mar 2018 07:01  -  16 Mar 2018 07:00  --------------------------------------------------------  IN:    Glucerna: 730 mL    norepinephrine Infusion: 64.2 mL    Solution: 498 mL    Solution: 100 mL    vasopressin Infusion: 31.2 mL  Total IN: 1423.4 mL    OUT:    Indwelling Catheter - Urethral: 720 mL  Total OUT: 720 mL    Total NET: 703.4 mL      16 Mar 2018 07:01  -  16 Mar 2018 14:57  --------------------------------------------------------  IN:    Glucerna: 60 mL    norepinephrine Infusion: 9.6 mL    Sodium Chloride 0.9% IV Bolus: 500 mL    sodium chloride 0.9%.: 200 mL    vasopressin Infusion: 12 mL  Total IN: 781.6 mL    OUT:    Indwelling Catheter - Urethral: 30 mL  Total OUT: 30 mL    Total NET: 751.6 mL        ====================================  LABS:                        8.3    16.7  )-----------( 223      ( 16 Mar 2018 07:17 )             26.1     03-16    137  |  108  |  27<H>  ----------------------------<  218<H>  4.8   |  21<L>  |  0.64    Ca    8.2<L>      16 Mar 2018 07:17  Phos  3.4     03-16  Mg     2.0     03-16      PT/INR - ( 16 Mar 2018 07:17 )   PT: 12.9 sec;   INR: 1.18 ratio             ====================================  RADIOLOGY & ADDITIONAL STUDIES:

## 2018-03-16 NOTE — PROCEDURE NOTE - NSPROCDETAILS_GEN_ALL_CORE
sterile dressing applied/sterile technique, catheter placed/ultrasound guidance/lumen(s) aspirated and flushed/guidewire recovered
dressing applied/flushes easily/secured in place/sterile technique, catheter placed/ultrasound utilization
dressing applied/flushes easily/blood seen on insertion/secured in place/sterile technique, catheter placed/ultrasound utilization
open wound, excised necrotic tissue and packed with betadine soaked krillex/incision left open, packing placed

## 2018-03-16 NOTE — PROCEDURE NOTE - ADDITIONAL PROCEDURE DETAILS
patient to remain in supine position to assist in hemostasis for the next 4 hours - conveyed to nurse and primary team Wound 10x12 cm. Excisional debridement of skin, subcutaneous tissues and necrotic debris using scalpel.    Patient to remain in supine position to assist in hemostasis for the next 4 hours - conveyed to nurse and primary team

## 2018-03-16 NOTE — PROGRESS NOTE ADULT - PROBLEM SELECTOR PLAN 2
FAST 7E.  Pt bedbound, nonverbal, dependent on all ADLs. + skin failure; comorbid severe PCM. Overall poor prognosis. Pt now eligible for IPU - left message for daughter to further discuss. FAST 7E.  Pt bedbound, nonverbal, dependent on all ADLs. + skin failure; comorbid severe PCM. Overall poor prognosis. Pt now eligible for IPU - family aware and likely for IPU Monday.

## 2018-03-16 NOTE — PROCEDURE NOTE - NSINFORMCONSENT_GEN_A_CORE
Benefits, risks, and possible complications of procedure explained to patient/caregiver who verbalized understanding and gave verbal consent./from daughter via phone - consent margaret
Benefits, risks, and possible complications of procedure explained to patient/caregiver who verbalized understanding and gave written consent.

## 2018-03-17 LAB
-  AMIKACIN: SIGNIFICANT CHANGE UP
-  AMPICILLIN/SULBACTAM: SIGNIFICANT CHANGE UP
-  AMPICILLIN: SIGNIFICANT CHANGE UP
-  AZTREONAM: SIGNIFICANT CHANGE UP
-  CEFAZOLIN: SIGNIFICANT CHANGE UP
-  CEFEPIME: SIGNIFICANT CHANGE UP
-  CEFOXITIN: SIGNIFICANT CHANGE UP
-  CEFTAZIDIME: SIGNIFICANT CHANGE UP
-  CEFTRIAXONE: SIGNIFICANT CHANGE UP
-  CIPROFLOXACIN: SIGNIFICANT CHANGE UP
-  ERTAPENEM: SIGNIFICANT CHANGE UP
-  GENTAMICIN: SIGNIFICANT CHANGE UP
-  LEVOFLOXACIN: SIGNIFICANT CHANGE UP
-  MEROPENEM: SIGNIFICANT CHANGE UP
-  PIPERACILLIN/TAZOBACTAM: SIGNIFICANT CHANGE UP
-  TOBRAMYCIN: SIGNIFICANT CHANGE UP
-  TRIMETHOPRIM/SULFAMETHOXAZOLE: SIGNIFICANT CHANGE UP
ANION GAP SERPL CALC-SCNC: 10 MMOL/L — SIGNIFICANT CHANGE UP (ref 5–17)
BASOPHILS # BLD AUTO: 0 K/UL — SIGNIFICANT CHANGE UP (ref 0–0.2)
BASOPHILS NFR BLD AUTO: 0.2 % — SIGNIFICANT CHANGE UP (ref 0–2)
BUN SERPL-MCNC: 26 MG/DL — HIGH (ref 7–18)
CALCIUM SERPL-MCNC: 8 MG/DL — LOW (ref 8.4–10.5)
CHLORIDE SERPL-SCNC: 110 MMOL/L — HIGH (ref 96–108)
CO2 SERPL-SCNC: 21 MMOL/L — LOW (ref 22–31)
CREAT SERPL-MCNC: 0.64 MG/DL — SIGNIFICANT CHANGE UP (ref 0.5–1.3)
CULTURE RESULTS: SIGNIFICANT CHANGE UP
EOSINOPHIL # BLD AUTO: 0.1 K/UL — SIGNIFICANT CHANGE UP (ref 0–0.5)
EOSINOPHIL NFR BLD AUTO: 0.6 % — SIGNIFICANT CHANGE UP (ref 0–6)
GLUCOSE SERPL-MCNC: 150 MG/DL — HIGH (ref 70–99)
HCT VFR BLD CALC: 27.6 % — LOW (ref 34.5–45)
HGB BLD-MCNC: 8.4 G/DL — LOW (ref 11.5–15.5)
LYMPHOCYTES # BLD AUTO: 1.5 K/UL — SIGNIFICANT CHANGE UP (ref 1–3.3)
LYMPHOCYTES # BLD AUTO: 11 % — LOW (ref 13–44)
MAGNESIUM SERPL-MCNC: 2 MG/DL — SIGNIFICANT CHANGE UP (ref 1.6–2.6)
MCHC RBC-ENTMCNC: 24.9 PG — LOW (ref 27–34)
MCHC RBC-ENTMCNC: 30.4 GM/DL — LOW (ref 32–36)
MCV RBC AUTO: 81.9 FL — SIGNIFICANT CHANGE UP (ref 80–100)
METHOD TYPE: SIGNIFICANT CHANGE UP
MONOCYTES # BLD AUTO: 0.6 K/UL — SIGNIFICANT CHANGE UP (ref 0–0.9)
MONOCYTES NFR BLD AUTO: 4.2 % — SIGNIFICANT CHANGE UP (ref 2–14)
NEUTROPHILS # BLD AUTO: 11.3 K/UL — HIGH (ref 1.8–7.4)
NEUTROPHILS NFR BLD AUTO: 84 % — HIGH (ref 43–77)
ORGANISM # SPEC MICROSCOPIC CNT: SIGNIFICANT CHANGE UP
ORGANISM # SPEC MICROSCOPIC CNT: SIGNIFICANT CHANGE UP
PHOSPHATE SERPL-MCNC: 3.2 MG/DL — SIGNIFICANT CHANGE UP (ref 2.5–4.5)
PLATELET # BLD AUTO: 272 K/UL — SIGNIFICANT CHANGE UP (ref 150–400)
POTASSIUM SERPL-MCNC: 4.5 MMOL/L — SIGNIFICANT CHANGE UP (ref 3.5–5.3)
POTASSIUM SERPL-SCNC: 4.5 MMOL/L — SIGNIFICANT CHANGE UP (ref 3.5–5.3)
RBC # BLD: 3.37 M/UL — LOW (ref 3.8–5.2)
RBC # FLD: 13.5 % — SIGNIFICANT CHANGE UP (ref 10.3–14.5)
SODIUM SERPL-SCNC: 141 MMOL/L — SIGNIFICANT CHANGE UP (ref 135–145)
SPECIMEN SOURCE: SIGNIFICANT CHANGE UP
WBC # BLD: 13.4 K/UL — HIGH (ref 3.8–10.5)
WBC # FLD AUTO: 13.4 K/UL — HIGH (ref 3.8–10.5)

## 2018-03-17 RX ORDER — FLUCONAZOLE 150 MG/1
TABLET ORAL
Qty: 0 | Refills: 0 | Status: DISCONTINUED | OUTPATIENT
Start: 2018-03-17 | End: 2018-03-20

## 2018-03-17 RX ORDER — FLUCONAZOLE 150 MG/1
200 TABLET ORAL EVERY 24 HOURS
Qty: 0 | Refills: 0 | Status: DISCONTINUED | OUTPATIENT
Start: 2018-03-18 | End: 2018-03-20

## 2018-03-17 RX ORDER — FLUCONAZOLE 150 MG/1
200 TABLET ORAL ONCE
Qty: 0 | Refills: 0 | Status: COMPLETED | OUTPATIENT
Start: 2018-03-17 | End: 2018-03-17

## 2018-03-17 RX ADMIN — MORPHINE SULFATE 2 MILLIGRAM(S): 50 CAPSULE, EXTENDED RELEASE ORAL at 22:12

## 2018-03-17 RX ADMIN — PANTOPRAZOLE SODIUM 40 MILLIGRAM(S): 20 TABLET, DELAYED RELEASE ORAL at 11:17

## 2018-03-17 RX ADMIN — MORPHINE SULFATE 2 MILLIGRAM(S): 50 CAPSULE, EXTENDED RELEASE ORAL at 13:30

## 2018-03-17 RX ADMIN — Medication 100 MILLIGRAM(S): at 05:36

## 2018-03-17 RX ADMIN — MORPHINE SULFATE 2 MILLIGRAM(S): 50 CAPSULE, EXTENDED RELEASE ORAL at 11:00

## 2018-03-17 RX ADMIN — MIDODRINE HYDROCHLORIDE 10 MILLIGRAM(S): 2.5 TABLET ORAL at 22:13

## 2018-03-17 RX ADMIN — INSULIN GLARGINE 6 UNIT(S): 100 INJECTION, SOLUTION SUBCUTANEOUS at 22:13

## 2018-03-17 RX ADMIN — MEROPENEM 100 MILLIGRAM(S): 1 INJECTION INTRAVENOUS at 18:10

## 2018-03-17 RX ADMIN — Medication 100 MILLIGRAM(S): at 22:13

## 2018-03-17 RX ADMIN — MIDODRINE HYDROCHLORIDE 10 MILLIGRAM(S): 2.5 TABLET ORAL at 05:34

## 2018-03-17 RX ADMIN — Medication 100 MILLIGRAM(S): at 13:15

## 2018-03-17 RX ADMIN — MEROPENEM 100 MILLIGRAM(S): 1 INJECTION INTRAVENOUS at 05:36

## 2018-03-17 RX ADMIN — HEPARIN SODIUM 5000 UNIT(S): 5000 INJECTION INTRAVENOUS; SUBCUTANEOUS at 05:34

## 2018-03-17 RX ADMIN — MORPHINE SULFATE 2 MILLIGRAM(S): 50 CAPSULE, EXTENDED RELEASE ORAL at 18:26

## 2018-03-17 RX ADMIN — MORPHINE SULFATE 2 MILLIGRAM(S): 50 CAPSULE, EXTENDED RELEASE ORAL at 02:00

## 2018-03-17 RX ADMIN — Medication 0: at 23:03

## 2018-03-17 RX ADMIN — MORPHINE SULFATE 2 MILLIGRAM(S): 50 CAPSULE, EXTENDED RELEASE ORAL at 05:36

## 2018-03-17 RX ADMIN — MORPHINE SULFATE 2 MILLIGRAM(S): 50 CAPSULE, EXTENDED RELEASE ORAL at 13:15

## 2018-03-17 RX ADMIN — MORPHINE SULFATE 2 MILLIGRAM(S): 50 CAPSULE, EXTENDED RELEASE ORAL at 22:15

## 2018-03-17 RX ADMIN — MORPHINE SULFATE 2 MILLIGRAM(S): 50 CAPSULE, EXTENDED RELEASE ORAL at 01:50

## 2018-03-17 RX ADMIN — FLUCONAZOLE 100 MILLIGRAM(S): 150 TABLET ORAL at 22:27

## 2018-03-17 RX ADMIN — MORPHINE SULFATE 2 MILLIGRAM(S): 50 CAPSULE, EXTENDED RELEASE ORAL at 10:45

## 2018-03-17 RX ADMIN — MORPHINE SULFATE 2 MILLIGRAM(S): 50 CAPSULE, EXTENDED RELEASE ORAL at 18:10

## 2018-03-17 RX ADMIN — MORPHINE SULFATE 2 MILLIGRAM(S): 50 CAPSULE, EXTENDED RELEASE ORAL at 05:34

## 2018-03-17 RX ADMIN — HEPARIN SODIUM 5000 UNIT(S): 5000 INJECTION INTRAVENOUS; SUBCUTANEOUS at 18:09

## 2018-03-17 RX ADMIN — MIDODRINE HYDROCHLORIDE 10 MILLIGRAM(S): 2.5 TABLET ORAL at 13:15

## 2018-03-17 NOTE — PROGRESS NOTE ADULT - ASSESSMENT
Patient is a 83y old  Female with multiple co-morbidities including  Dementia, bedbound  and DM2, has sent in to the ER from [facility] for evaluation of acute renal failure with hyperkalemia, hyperosmolar hyperglycemic state secondary to diabetes and sepsis (11 Mar 2018 14:55). As per daughters at bedside she has been getting progressively less interactive in the past month and has been eating very little.  In the ER, she found to be afebrile but tachycardic and hypotensive to 90/56. The labs significant for positive Urine analysis, Leukocytosis, lactate of 7.3 and  Blood glucose of 584, elevated creatinine  of 5.15. 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 She has admitted to ICU since requiring pressor and started on broad spectrum antibiotics, cultures pending. The ID consult requested to assist with further evaluation and antibiotic  management.    # Septic shock  # GRAM negative bacteremia- Clostridium ramosum  # Infected Decubitus ulcer with Left gluteal  Abscess and Osteomyelitis- s/p surgical debridement 3/16- culture grew Proteus and Candida  # UTI - proteus    Would recommend:  1. Add Fluconazole  2. Continue  Clindamycin as a antitoxin in the setting of Clostridium bacteremia, until off pressor  3. Continue  Meropenem to cover Clostridium and Proteus  4. Monitor WBC count, continues trending down  5. Titrate down pressors as tolerated  6. Continue Wound care     d/w ICU team    will follow the patient with you

## 2018-03-17 NOTE — PROGRESS NOTE ADULT - SUBJECTIVE AND OBJECTIVE BOX
Patient is seen and examined at the bed side, remains afebrile and on pressor. The Leukocytosis continues trending down. The sacral wound/ulcer culture grew Proteus and Candida.        REVIEW OF SYSTEMS: Unable to obtain since patient is nonverbal           ICU Vital Signs Last 24 Hrs  T(C): 36.8 (17 Mar 2018 17:56), Max: 36.8 (17 Mar 2018 17:56)  T(F): 98.3 (17 Mar 2018 17:56), Max: 98.3 (17 Mar 2018 17:56)  HR: 100 (17 Mar 2018 20:30) (66 - 100)  BP: 88/66 (17 Mar 2018 20:30) (77/35 - 114/75)  BP(mean): 74 (17 Mar 2018 20:30) (49 - 87)  ABP: --  ABP(mean): --  RR: 14 (17 Mar 2018 20:30) (7 - 19)  SpO2: 100% (17 Mar 2018 20:30) (88% - 100%)          ALLERGIES: NKDA          PHYSICAL EXAM:  GENERAL: Not in  distress  CVS: s1 and s2 present  RESP: Air entry B/L  GI: Abdomen nondistended and nontender  BACK: Stage IV  decubital ulcer bandage over it  EXT: No pedal edema   CNS: Non verbal and awake          LABS:                        8.4    13.4  )-----------( 272      ( 17 Mar 2018 06:13 )             27.6                           8.3    16.7  )-----------( 223      ( 16 Mar 2018 07:17 )             26.1                            10.1   25.8  )-----------( 354      ( 12 Mar 2018 06:44 )             34.3           03-    141  |  110<H>  |  26<H>  ----------------------------<  150<H>  4.5   |  21<L>  |  0.64    Ca    8.0<L>      17 Mar 2018 06:13  Phos  3.2     -17  Mg     2.0                  03-16    137  |  108  |  27<H>  ----------------------------<  218<H>  4.8   |  21<L>  |  0.64    Ca    8.2<L>      16 Mar 2018 07:17  Phos  3.4     03-16  Mg     2.0     03-16    TPro  6.5  /  Alb  1.6<L>  /  TBili  0.2  /  DBili  x   /  AST  28  /  ALT  29  /  AlkPhos  69  03-11             Urinalysis Basic - ( 11 Mar 2018 13:40 )    Color: Yellow / Appearance: Turbid / S.010 / pH: x  Gluc: x / Ketone: Negative  / Bili: Small / Urobili: 1   Blood: x / Protein: 100 / Nitrite: Positive   Leuk Esterase: Moderate / RBC: 5-10 /HPF / WBC >50 /HPF   Sq Epi: x / Non Sq Epi: Occasional /HPF / Bacteria: TNTC /HPF      CAPILLARY BLOOD GLUCOSE      POCT Blood Glucose.: 100 mg/dL (11 Mar 2018 19:52)  POCT Blood Glucose.: 125 mg/dL (11 Mar 2018 18:56)  POCT Blood Glucose.: 167 mg/dL (11 Mar 2018 18:02)  POCT Blood Glucose.: 222 mg/dL (11 Mar 2018 17:14)  POCT Blood Glucose.: 200 mg/dL (11 Mar 2018 17:12)  POCT Blood Glucose.: 422 mg/dL (11 Mar 2018 15:02)  POCT Blood Glucose.: 467 mg/dL (11 Mar 2018 13:41)  POCT Blood Glucose.: 458 mg/dL (11 Mar 2018 11:42)    ABG - ( 11 Mar 2018 12:52 )  pH: 7.28  /  pCO2: 32    /  pO2: 49    / HCO3: 14    / Base Excess: -11.2 /  SaO2: 78              MEDICATIONS  (STANDING):  chlorhexidine 2% Cloths 1 Application(s) Topical daily  clindamycin IVPB 900 milliGRAM(s) IV Intermittent every 8 hours  clindamycin IVPB      dextrose 50% Injectable 12.5 Gram(s) IV Push once  dextrose 50% Injectable 25 Gram(s) IV Push once  dextrose 50% Injectable 25 Gram(s) IV Push once  heparin  Injectable 5000 Unit(s) SubCutaneous every 12 hours  insulin glargine Injectable (LANTUS) 6 Unit(s) SubCutaneous at bedtime  insulin lispro (HumaLOG) corrective regimen sliding scale   SubCutaneous every 6 hours  meropenem  IVPB 500 milliGRAM(s) IV Intermittent every 12 hours  midodrine 10 milliGRAM(s) Oral every 8 hours  morphine  - Injectable 2 milliGRAM(s) IV Push every 4 hours  norepinephrine Infusion 0.5 MICROgram(s)/kG/Min (19.125 mL/Hr) IV Continuous <Continuous>  pantoprazole  Injectable 40 milliGRAM(s) IV Push daily  sodium chloride 0.9%. 1000 milliLiter(s) (50 mL/Hr) IV Continuous <Continuous>    MEDICATIONS  (PRN):  dextrose Gel 1 Dose(s) Oral once PRN Blood Glucose LESS THAN 70 milliGRAM(s)/deciliter  glucagon  Injectable 1 milliGRAM(s) IntraMuscular once PRN Glucose LESS THAN 70 milligrams/deciliter          RADIOLOGY & ADDITIONAL TESTS:    3/13/18 : Xray Chest 1 View- PORTABLE-Urgent (18 @ 13:17) Film rotated to the right. Lung visualization is difficult. Right jugular line remains. Heart size cannot be assessed.  There is no significant infiltrate at the left base new since . Right base difficult to assess. An NG tube has also been inserted.        3/11/18 CT Abdomen and Pelvis No Cont (18 @ 15:51) Sacral decubitus ulcer with suspected osteomyelitis and possible intramuscular left gluteal abscesses.          MICROBIOLOGY DATA:    Culture - Other (18 @ 00:19)    -  Amikacin: S <=8    -  Ampicillin: S <=2    -  Ampicillin/Sulbactam: S <=4/2    -  Aztreonam: S <=4    -  Cefazolin: S <=2    -  Cefepime: S <=2    -  Cefoxitin: S <=4    -  Ceftazidime: S <=1    -  Ceftriaxone: S <=1    -  Ciprofloxacin: R >2    -  Ertapenem: S <=0.5    -  Gentamicin: S <=1    -  Levofloxacin: R >4    -  Meropenem: S <=1    -  Piperacillin/Tazobactam: S <=8    -  Tobramycin: S <=2    -  Trimethoprim/Sulfamethoxazole: S <=0.5/9.5    Specimen Source: Skin decubitus wound    Culture Results:   Few Proteus mirabilis  Numerous Candida albicans    Organism Identification: Proteus mirabilis    Organism: Proteus mirabilis    Method Type: RONNA        Culture - Blood (03.15.18 @ 11:15)    Specimen Source: .Blood Blood-Peripheral    Culture Results:   No growth to date.    Culture - Blood (03.15.18 @ 11:14)    Specimen Source: .Blood Blood-Peripheral    Culture Results:   No growth to date.        Culture - Blood (18 @ 21:58)    Gram Stain:   Growth in anaerobic bottle: Gram Negative Rods    Specimen Source: .Blood Blood-Peripheral    Culture Results:   Growth in anaerobic bottle: Clostridium ramosum  "Susceptibilities not performed"        Culture - Urine (18 @ 21:38)    -  Amikacin: S <=8    -  Ampicillin: S <=2    -  Ampicillin/Sulbactam: S <=4/2    -  Aztreonam: S <=4    -  Cefazolin: S <=2    -  Cefepime: S <=2    -  Cefoxitin: S <=4    -  Ceftazidime: S <=1    -  Ceftriaxone: S <=1    -  Ciprofloxacin: R >2    -  Ertapenem: S <=0.5    -  Gentamicin: S 2    -  Levofloxacin: R >4    -  Meropenem: S <=1    -  Nitrofurantoin: R 64    -  Piperacillin/Tazobactam: S <=8    -  Tobramycin: S <=2    -  Trimethoprim/Sulfamethoxazole: R >2/38    Specimen Source: .Urine Catheterized    Culture Results:   >100,000 CFU/ml Proteus mirabilis    Organism Identification: Proteus mirabilis    Organism: Proteus mirabilis    Method Type: RONNA          Culture - Blood (18 @ 21:58)    -  Multidrug (KPC pos) resistant organism: Nondet    -  Staphylococcus aureus: Nondet    -  Methicillin resistant Staphylococcus aureus (MRSA): Nondet    -  Coagulase negative Staphylococcus: Nondet    -  Enterococcus species: Nondet    -  Vancomycin resistant Enterococcus sp.: Nondet    -  Escherichia coli: Nondet    -  Klebsiella oxytoca: Nondet    -  Klebsiella pneumoniae: Nondet    -  Serratia marcescens: Nondet    -  Haemophilus influenzae: Nondet    -  Listeria monocytogenes: Nondet    -  Neisseria meningitidis: Nondet    -  Pseudomonas aeruginosa: Nondet    -  Acinetobacter baumanii: Nondet    -  Enterobacter cloacae complex: Nondet    -  Streptococcus sp. (Not Grp A, B or S pneumoniae): Nondet    -  Streptococcus agalactiae (Group B): Nondet    -  Streptococcus pyogenes (Group A): Nondet    -  Streptococcus pneumoniae: Nondet    -  Candida albicans: Nondet    -  Candida glabrata: Nondet    -  Candida krusei: Nondet  Culture - Blood (18 @ 21:58)    Gram Stain:   Growth in anaerobic bottle: Gram Negative Rods    Specimen Source: .Blood Blood-Peripheral    Culture Results:   Growth in anaerobic bottle: Gram Negative Rods      -  Candida parapsilosis: Nondet    -  Candida tropicalis: Nondet    Gram Stain:   Growth in anaerobic bottle: Gram Negative Rods    Specimen Source: .Blood Blood-Peripheral    Organism: Blood Culture PCR    Culture Results:   Growth in anaerobic bottle: Gram Negative Rods  "Due to technical problems, Proteus sp. will Not be reported as part of  the BCID panel until further notice"  ***Blood Panel PCR results on this specimen are available  approximately 3 hours after the Gram stain result.***  Gram stain, PCR, and/or culture results may not always  correspond due to difference in methodologies.  ************************************************************  This PCR assay was performed using [employer].  The following targets are tested for: Enterococcus,  vancomycin resistant enterococci, Listeria monocytogenes,  coagulase negative staphylococci, S. aureus,  methicillin resistant S. aureus, Streptococcus agalactiae  (Group B), S. pneumoniae, S. pyogenes (Group A),  Acinetobacter baumannii, Enterobacter cloacae, E. coli,  Klebsiella oxytoca, K. pneumoniae, Proteus sp.,  Serratia marcescens, Haemophilus influenzae,  Neisseria meningitidis, Pseudomonas aeruginosa, Candida  albicans, C. glabrata, C krusei, C parapsilosis,  C. tropicalis and the KPC resistance gene.    Organism Identification: Blood Culture PCR    Method Type: PCR    Rapid Respiratory Viral Panel (18 @ 14:03)    Rapid RVP Result: NotDetec: The [employer] RVP Rapid uses polymerase chain reaction (PCR) and melt  curve analysis to screen for adenovirus; coronavirus HKU1, NL63, 229E,  OC43; human metapneumovirus (hMPV); human enterovirus/rhinovirus  (Entero/RV); influenza A; influenza A/H1;influenza A/H3; influenza  A/H1-2009; influenza B; parainfluenza viruses 1, 2, 3, 4; respiratory  syncytial virus; Bordetella pertussis; Mycoplasma pneumoniae; and  Chlamydophila pneumoniae.

## 2018-03-17 NOTE — PROGRESS NOTE ADULT - PROBLEM SELECTOR PLAN 1
Secondary to UTI and infected sacral ulcer   • continue with  meropenem [renally adjusted, allergic to pencillin]   - on vanco for clostridium ramosum  -clindamycin added as per ID recommendation for unknown organism in   - possible gluteal abcess noted on CT abdomen pelvis  blood culture gram negative rods in anaerobic bottle- clostridium ramosum and urine culture shows proteus - follow final results  -s/p bedside debridement   follow up wound cultures

## 2018-03-17 NOTE — PROGRESS NOTE ADULT - SUBJECTIVE AND OBJECTIVE BOX
INTERVAL HPI/OVERNIGHT EVENTS: *** no events over night.       PRESSORS: [x ] YES [ ] NO  WHICH: levophed     ANTIBIOTICS:    clinda              DATE STARTED:3/14  ANTIBIOTICS:    myriam              DATE STARTED:3/11  ANTIBIOTICS:                  DATE STARTED:      Antimicrobial:  clindamycin IVPB 900 milliGRAM(s) IV Intermittent every 8 hours  clindamycin IVPB      meropenem  IVPB 500 milliGRAM(s) IV Intermittent every 12 hours    Cardiovascular:  midodrine 10 milliGRAM(s) Oral every 8 hours  norepinephrine Infusion 0.5 MICROgram(s)/kG/Min IV Continuous <Continuous>    Pulmonary:    Hematalogic:  heparin  Injectable 5000 Unit(s) SubCutaneous every 12 hours    Other:  chlorhexidine 2% Cloths 1 Application(s) Topical daily  dextrose 50% Injectable 12.5 Gram(s) IV Push once  dextrose 50% Injectable 25 Gram(s) IV Push once  dextrose 50% Injectable 25 Gram(s) IV Push once  dextrose Gel 1 Dose(s) Oral once PRN  glucagon  Injectable 1 milliGRAM(s) IntraMuscular once PRN  insulin glargine Injectable (LANTUS) 6 Unit(s) SubCutaneous at bedtime  insulin lispro (HumaLOG) corrective regimen sliding scale   SubCutaneous every 6 hours  morphine  - Injectable 2 milliGRAM(s) IV Push every 4 hours  pantoprazole  Injectable 40 milliGRAM(s) IV Push daily  sodium chloride 0.9%. 1000 milliLiter(s) IV Continuous <Continuous>    chlorhexidine 2% Cloths 1 Application(s) Topical daily  clindamycin IVPB 900 milliGRAM(s) IV Intermittent every 8 hours  clindamycin IVPB      dextrose 50% Injectable 12.5 Gram(s) IV Push once  dextrose 50% Injectable 25 Gram(s) IV Push once  dextrose 50% Injectable 25 Gram(s) IV Push once  dextrose Gel 1 Dose(s) Oral once PRN  glucagon  Injectable 1 milliGRAM(s) IntraMuscular once PRN  heparin  Injectable 5000 Unit(s) SubCutaneous every 12 hours  insulin glargine Injectable (LANTUS) 6 Unit(s) SubCutaneous at bedtime  insulin lispro (HumaLOG) corrective regimen sliding scale   SubCutaneous every 6 hours  meropenem  IVPB 500 milliGRAM(s) IV Intermittent every 12 hours  midodrine 10 milliGRAM(s) Oral every 8 hours  morphine  - Injectable 2 milliGRAM(s) IV Push every 4 hours  norepinephrine Infusion 0.5 MICROgram(s)/kG/Min IV Continuous <Continuous>  pantoprazole  Injectable 40 milliGRAM(s) IV Push daily  sodium chloride 0.9%. 1000 milliLiter(s) IV Continuous <Continuous>    Drug Dosing Weight  Height (cm): 124.46 (16 May 2017 20:03)  Weight (kg): 46.4 (15 Mar 2018 08:00)  BMI (kg/m2): 30 (15 Mar 2018 08:00)  BSA (m2): 1.21 (15 Mar 2018 08:00)    CENTRAL LINE: [x ] YES [ ] NO  LOCATION: [facility]  DATE INSERTED: 03/12/2018  REMOVE: [ ] YES [ ] NO  EXPLAIN:    BANKS: [ x] YES [ ] NO    DATE INSERTED:  REMOVE:  [ ] YES [ ] NO  EXPLAIN:    A-LINE:  [ ] YES [x ] NO  LOCATION:   DATE INSERTED:  REMOVE:  [ ] YES [ ] NO  EXPLAIN:      ICU Vital Signs Last 24 Hrs  T(C): 36.2 (17 Mar 2018 00:23), Max: 36.2 (16 Mar 2018 06:00)  T(F): 97.2 (17 Mar 2018 00:23), Max: 97.2 (16 Mar 2018 06:00)  HR: 73 (17 Mar 2018 01:30) (59 - 88)  BP: 97/62 (17 Mar 2018 01:30) (71/49 - 115/80)  BP(mean): 72 (17 Mar 2018 01:30) (57 - 101)  ABP: --  ABP(mean): --  RR: 14 (17 Mar 2018 01:30) (5 - 21)  SpO2: 88% (17 Mar 2018 01:30) (88% - 100%)            03-15 @ 07:01  -  03-16 @ 07:00  --------------------------------------------------------  IN: 1423.4 mL / OUT: 720 mL / NET: 703.4 mL      PHYSICAL EXAM:    GENERAL: [x ]NAD, [x ]well-groomed, [x ]well-developed  HEAD:  [ x]Atraumatic, [x ]Normocephalic  EYES: [x ]EOMI, [x ]PERRLA, [x ]conjunctiva and sclera clear  ENMT: [x ]No tonsillar erythema, exudates, or enlargement; [x ]Moist mucous membranes, [ ]Good dentition, [ ]No lesions  NECK: [x ]Supple, normal appearance, [x ]No JVD; [x ]Normal thyroid; [ ]Trachea midline  NERVOUS SYSTEM:  [x ]Alert & Oriented X0, [x] unable to access strength  CHEST/LUNG: [x]No chest deformity; [x]Normal percussion bilaterally; [ ]No rales, rhonchi, wheezing   HEART: [x]Regular rate and rhythm; [x]No murmurs, rubs, or gallops  ABDOMEN: [x]Soft, Nontender, Nondistended; [ ]Bowel sounds present  EXTREMITIES:  [x]2+ Peripheral Pulses, [ ]No clubbing, cyanosis, or edema  LYMPH: [x]No lymphadenopathy noted  SKIN: [x]No rashes or lesions; [ ]Good capillary refill  BACK: 8x10 decubitus ulcer with draining pus noted           LABS:  CBC Full  -  ( 16 Mar 2018 07:17 )  WBC Count : 16.7 K/uL  Hemoglobin : 8.3 g/dL  Hematocrit : 26.1 %  Platelet Count - Automated : 223 K/uL  Mean Cell Volume : 82.3 fl  Mean Cell Hemoglobin : 26.1 pg  Mean Cell Hemoglobin Concentration : 31.7 gm/dL  Auto Neutrophil # : x  Auto Lymphocyte # : x  Auto Monocyte # : x  Auto Eosinophil # : x  Auto Basophil # : x  Auto Neutrophil % : x  Auto Lymphocyte % : x  Auto Monocyte % : x  Auto Eosinophil % : x  Auto Basophil % : x    03-16    137  |  108  |  27<H>  ----------------------------<  218<H>  4.8   |  21<L>  |  0.64    Ca    8.2<L>      16 Mar 2018 07:17  Phos  3.4     03-16  Mg     2.0     03-16      PT/INR - ( 16 Mar 2018 07:17 )   PT: 12.9 sec;   INR: 1.18 ratio             Culture Results:   Few Proteus mirabilis  Numerous Candida albicans (03-16 @ 00:19)  Culture Results:   No growth to date. (03-15 @ 11:15)  Culture Results:   No growth to date. (03-15 @ 11:14)      RADIOLOGY & ADDITIONAL STUDIES REVIEWED:  ***    [ ]GOALS OF CARE DISCUSSION WITH PATIENT/FAMILY/PROXY:    CRITICAL CARE TIME SPENT: 35 minutes INTERVAL HPI/OVERNIGHT EVENTS: *** no events over night, s/p debridement yesterday    PRESSORS: [x ] YES [ ] NO  WHICH: levophed     ANTIBIOTICS:    clinda              DATE STARTED:3/14  ANTIBIOTICS:    myriam              DATE STARTED:3/11  ANTIBIOTICS:                  DATE STARTED:      Antimicrobial:  clindamycin IVPB 900 milliGRAM(s) IV Intermittent every 8 hours  clindamycin IVPB      meropenem  IVPB 500 milliGRAM(s) IV Intermittent every 12 hours    Cardiovascular:  midodrine 10 milliGRAM(s) Oral every 8 hours  norepinephrine Infusion 0.5 MICROgram(s)/kG/Min IV Continuous <Continuous>    Pulmonary:    Hematalogic:  heparin  Injectable 5000 Unit(s) SubCutaneous every 12 hours    Other:  chlorhexidine 2% Cloths 1 Application(s) Topical daily  dextrose 50% Injectable 12.5 Gram(s) IV Push once  dextrose 50% Injectable 25 Gram(s) IV Push once  dextrose 50% Injectable 25 Gram(s) IV Push once  dextrose Gel 1 Dose(s) Oral once PRN  glucagon  Injectable 1 milliGRAM(s) IntraMuscular once PRN  insulin glargine Injectable (LANTUS) 6 Unit(s) SubCutaneous at bedtime  insulin lispro (HumaLOG) corrective regimen sliding scale   SubCutaneous every 6 hours  morphine  - Injectable 2 milliGRAM(s) IV Push every 4 hours  pantoprazole  Injectable 40 milliGRAM(s) IV Push daily  sodium chloride 0.9%. 1000 milliLiter(s) IV Continuous <Continuous>    chlorhexidine 2% Cloths 1 Application(s) Topical daily  clindamycin IVPB 900 milliGRAM(s) IV Intermittent every 8 hours  clindamycin IVPB      dextrose 50% Injectable 12.5 Gram(s) IV Push once  dextrose 50% Injectable 25 Gram(s) IV Push once  dextrose 50% Injectable 25 Gram(s) IV Push once  dextrose Gel 1 Dose(s) Oral once PRN  glucagon  Injectable 1 milliGRAM(s) IntraMuscular once PRN  heparin  Injectable 5000 Unit(s) SubCutaneous every 12 hours  insulin glargine Injectable (LANTUS) 6 Unit(s) SubCutaneous at bedtime  insulin lispro (HumaLOG) corrective regimen sliding scale   SubCutaneous every 6 hours  meropenem  IVPB 500 milliGRAM(s) IV Intermittent every 12 hours  midodrine 10 milliGRAM(s) Oral every 8 hours  morphine  - Injectable 2 milliGRAM(s) IV Push every 4 hours  norepinephrine Infusion 0.5 MICROgram(s)/kG/Min IV Continuous <Continuous>  pantoprazole  Injectable 40 milliGRAM(s) IV Push daily  sodium chloride 0.9%. 1000 milliLiter(s) IV Continuous <Continuous>    Drug Dosing Weight  Height (cm): 124.46 (16 May 2017 20:03)  Weight (kg): 46.4 (15 Mar 2018 08:00)  BMI (kg/m2): 30 (15 Mar 2018 08:00)  BSA (m2): 1.21 (15 Mar 2018 08:00)    CENTRAL LINE: [x ] YES [ ] NO  LOCATION: [facility]  DATE INSERTED: 03/12/2018  REMOVE: [ ] YES [ ] NO  EXPLAIN:    BANKS: [ x] YES [ ] NO    DATE INSERTED:  REMOVE:  [ ] YES [ ] NO  EXPLAIN:    A-LINE:  [ ] YES [x ] NO  LOCATION:   DATE INSERTED:  REMOVE:  [ ] YES [ ] NO  EXPLAIN:      ICU Vital Signs Last 24 Hrs  T(C): 36.2 (17 Mar 2018 00:23), Max: 36.2 (16 Mar 2018 06:00)  T(F): 97.2 (17 Mar 2018 00:23), Max: 97.2 (16 Mar 2018 06:00)  HR: 73 (17 Mar 2018 01:30) (59 - 88)  BP: 97/62 (17 Mar 2018 01:30) (71/49 - 115/80)  BP(mean): 72 (17 Mar 2018 01:30) (57 - 101)  ABP: --  ABP(mean): --  RR: 14 (17 Mar 2018 01:30) (5 - 21)  SpO2: 88% (17 Mar 2018 01:30) (88% - 100%)            03-15 @ 07:01  -  03-16 @ 07:00  --------------------------------------------------------  IN: 1423.4 mL / OUT: 720 mL / NET: 703.4 mL      PHYSICAL EXAM:    GENERAL: [x ]NAD, [x ]well-groomed, [x ]well-developed  HEAD:  [ x]Atraumatic, [x ]Normocephalic  EYES: [x ]EOMI, [x ]PERRLA, [x ]conjunctiva and sclera clear  ENMT: [x ]No tonsillar erythema, exudates, or enlargement; [x ]Moist mucous membranes, [ ]Good dentition, [ ]No lesions  NECK: [x ]Supple, normal appearance, [x ]No JVD; [x ]Normal thyroid; [ ]Trachea midline  NERVOUS SYSTEM:  [x ]Alert & Oriented X0, [x] unable to access strength  CHEST/LUNG: [x]No chest deformity; [x]Normal percussion bilaterally; [ ]No rales, rhonchi, wheezing   HEART: [x]Regular rate and rhythm; [x]No murmurs, rubs, or gallops  ABDOMEN: [x]Soft, Nontender, Nondistended; [ ]Bowel sounds present  EXTREMITIES:  [x]2+ Peripheral Pulses, [ ]No clubbing, cyanosis, or edema  LYMPH: [x]No lymphadenopathy noted  SKIN: [x]No rashes or lesions; [ ]Good capillary refill  BACK: 8x10 decubitus ulcer with draining pus noted           LABS:  CBC Full  -  ( 16 Mar 2018 07:17 )  WBC Count : 16.7 K/uL  Hemoglobin : 8.3 g/dL  Hematocrit : 26.1 %  Platelet Count - Automated : 223 K/uL  Mean Cell Volume : 82.3 fl  Mean Cell Hemoglobin : 26.1 pg  Mean Cell Hemoglobin Concentration : 31.7 gm/dL  Auto Neutrophil # : x  Auto Lymphocyte # : x  Auto Monocyte # : x  Auto Eosinophil # : x  Auto Basophil # : x  Auto Neutrophil % : x  Auto Lymphocyte % : x  Auto Monocyte % : x  Auto Eosinophil % : x  Auto Basophil % : x    03-16    137  |  108  |  27<H>  ----------------------------<  218<H>  4.8   |  21<L>  |  0.64    Ca    8.2<L>      16 Mar 2018 07:17  Phos  3.4     03-16  Mg     2.0     03-16      PT/INR - ( 16 Mar 2018 07:17 )   PT: 12.9 sec;   INR: 1.18 ratio             Culture Results:   Few Proteus mirabilis  Numerous Candida albicans (03-16 @ 00:19)  Culture Results:   No growth to date. (03-15 @ 11:15)  Culture Results:   No growth to date. (03-15 @ 11:14)      RADIOLOGY & ADDITIONAL STUDIES REVIEWED:  ***    [ ]GOALS OF CARE DISCUSSION WITH PATIENT/FAMILY/PROXY:    CRITICAL CARE TIME SPENT: 35 minutes

## 2018-03-18 LAB
-  AMIKACIN: SIGNIFICANT CHANGE UP
-  AMPICILLIN/SULBACTAM: SIGNIFICANT CHANGE UP
-  AMPICILLIN: SIGNIFICANT CHANGE UP
-  AZTREONAM: SIGNIFICANT CHANGE UP
-  CEFAZOLIN: SIGNIFICANT CHANGE UP
-  CEFEPIME: SIGNIFICANT CHANGE UP
-  CEFOXITIN: SIGNIFICANT CHANGE UP
-  CEFTAZIDIME: SIGNIFICANT CHANGE UP
-  CEFTRIAXONE: SIGNIFICANT CHANGE UP
-  CIPROFLOXACIN: SIGNIFICANT CHANGE UP
-  ERTAPENEM: SIGNIFICANT CHANGE UP
-  GENTAMICIN: SIGNIFICANT CHANGE UP
-  LEVOFLOXACIN: SIGNIFICANT CHANGE UP
-  MEROPENEM: SIGNIFICANT CHANGE UP
-  PIPERACILLIN/TAZOBACTAM: SIGNIFICANT CHANGE UP
-  TOBRAMYCIN: SIGNIFICANT CHANGE UP
-  TRIMETHOPRIM/SULFAMETHOXAZOLE: SIGNIFICANT CHANGE UP
ALBUMIN SERPL ELPH-MCNC: 2.2 G/DL — LOW (ref 3.5–5)
ALP SERPL-CCNC: 67 U/L — SIGNIFICANT CHANGE UP (ref 40–120)
ALT FLD-CCNC: 23 U/L DA — SIGNIFICANT CHANGE UP (ref 10–60)
ANION GAP SERPL CALC-SCNC: 6 MMOL/L — SIGNIFICANT CHANGE UP (ref 5–17)
AST SERPL-CCNC: 17 U/L — SIGNIFICANT CHANGE UP (ref 10–40)
BASOPHILS # BLD AUTO: 0.1 K/UL — SIGNIFICANT CHANGE UP (ref 0–0.2)
BASOPHILS NFR BLD AUTO: 0.7 % — SIGNIFICANT CHANGE UP (ref 0–2)
BILIRUB SERPL-MCNC: 0.3 MG/DL — SIGNIFICANT CHANGE UP (ref 0.2–1.2)
BUN SERPL-MCNC: 24 MG/DL — HIGH (ref 7–18)
CALCIUM SERPL-MCNC: 8.5 MG/DL — SIGNIFICANT CHANGE UP (ref 8.4–10.5)
CHLORIDE SERPL-SCNC: 115 MMOL/L — HIGH (ref 96–108)
CO2 SERPL-SCNC: 26 MMOL/L — SIGNIFICANT CHANGE UP (ref 22–31)
CREAT SERPL-MCNC: 0.64 MG/DL — SIGNIFICANT CHANGE UP (ref 0.5–1.3)
CULTURE RESULTS: SIGNIFICANT CHANGE UP
EOSINOPHIL # BLD AUTO: 0.2 K/UL — SIGNIFICANT CHANGE UP (ref 0–0.5)
EOSINOPHIL NFR BLD AUTO: 1.4 % — SIGNIFICANT CHANGE UP (ref 0–6)
GLUCOSE SERPL-MCNC: 102 MG/DL — HIGH (ref 70–99)
HCT VFR BLD CALC: 28.4 % — LOW (ref 34.5–45)
HGB BLD-MCNC: 9 G/DL — LOW (ref 11.5–15.5)
LYMPHOCYTES # BLD AUTO: 1.1 K/UL — SIGNIFICANT CHANGE UP (ref 1–3.3)
LYMPHOCYTES # BLD AUTO: 8.9 % — LOW (ref 13–44)
MAGNESIUM SERPL-MCNC: 2.2 MG/DL — SIGNIFICANT CHANGE UP (ref 1.6–2.6)
MCHC RBC-ENTMCNC: 25.9 PG — LOW (ref 27–34)
MCHC RBC-ENTMCNC: 31.6 GM/DL — LOW (ref 32–36)
MCV RBC AUTO: 82.1 FL — SIGNIFICANT CHANGE UP (ref 80–100)
METHOD TYPE: SIGNIFICANT CHANGE UP
MONOCYTES # BLD AUTO: 0.5 K/UL — SIGNIFICANT CHANGE UP (ref 0–0.9)
MONOCYTES NFR BLD AUTO: 4.4 % — SIGNIFICANT CHANGE UP (ref 2–14)
NEUTROPHILS # BLD AUTO: 10.1 K/UL — HIGH (ref 1.8–7.4)
NEUTROPHILS NFR BLD AUTO: 84.6 % — HIGH (ref 43–77)
ORGANISM # SPEC MICROSCOPIC CNT: SIGNIFICANT CHANGE UP
ORGANISM # SPEC MICROSCOPIC CNT: SIGNIFICANT CHANGE UP
PHOSPHATE SERPL-MCNC: 2.7 MG/DL — SIGNIFICANT CHANGE UP (ref 2.5–4.5)
PLATELET # BLD AUTO: 294 K/UL — SIGNIFICANT CHANGE UP (ref 150–400)
POTASSIUM SERPL-MCNC: 4.8 MMOL/L — SIGNIFICANT CHANGE UP (ref 3.5–5.3)
POTASSIUM SERPL-SCNC: 4.8 MMOL/L — SIGNIFICANT CHANGE UP (ref 3.5–5.3)
PROT SERPL-MCNC: 5.9 G/DL — LOW (ref 6–8.3)
RBC # BLD: 3.46 M/UL — LOW (ref 3.8–5.2)
RBC # FLD: 14.5 % — SIGNIFICANT CHANGE UP (ref 10.3–14.5)
SODIUM SERPL-SCNC: 147 MMOL/L — HIGH (ref 135–145)
SPECIMEN SOURCE: SIGNIFICANT CHANGE UP
WBC # BLD: 12 K/UL — HIGH (ref 3.8–10.5)
WBC # FLD AUTO: 12 K/UL — HIGH (ref 3.8–10.5)

## 2018-03-18 RX ORDER — INSULIN GLARGINE 100 [IU]/ML
5 INJECTION, SOLUTION SUBCUTANEOUS AT BEDTIME
Qty: 0 | Refills: 0 | Status: DISCONTINUED | OUTPATIENT
Start: 2018-03-18 | End: 2018-03-20

## 2018-03-18 RX ADMIN — Medication 0: at 05:17

## 2018-03-18 RX ADMIN — MORPHINE SULFATE 2 MILLIGRAM(S): 50 CAPSULE, EXTENDED RELEASE ORAL at 21:57

## 2018-03-18 RX ADMIN — MORPHINE SULFATE 2 MILLIGRAM(S): 50 CAPSULE, EXTENDED RELEASE ORAL at 05:19

## 2018-03-18 RX ADMIN — MORPHINE SULFATE 2 MILLIGRAM(S): 50 CAPSULE, EXTENDED RELEASE ORAL at 13:17

## 2018-03-18 RX ADMIN — MIDODRINE HYDROCHLORIDE 10 MILLIGRAM(S): 2.5 TABLET ORAL at 14:28

## 2018-03-18 RX ADMIN — MORPHINE SULFATE 2 MILLIGRAM(S): 50 CAPSULE, EXTENDED RELEASE ORAL at 05:16

## 2018-03-18 RX ADMIN — MORPHINE SULFATE 2 MILLIGRAM(S): 50 CAPSULE, EXTENDED RELEASE ORAL at 17:38

## 2018-03-18 RX ADMIN — PANTOPRAZOLE SODIUM 40 MILLIGRAM(S): 20 TABLET, DELAYED RELEASE ORAL at 11:05

## 2018-03-18 RX ADMIN — MORPHINE SULFATE 2 MILLIGRAM(S): 50 CAPSULE, EXTENDED RELEASE ORAL at 19:04

## 2018-03-18 RX ADMIN — MORPHINE SULFATE 2 MILLIGRAM(S): 50 CAPSULE, EXTENDED RELEASE ORAL at 10:53

## 2018-03-18 RX ADMIN — INSULIN GLARGINE 5 UNIT(S): 100 INJECTION, SOLUTION SUBCUTANEOUS at 21:57

## 2018-03-18 RX ADMIN — Medication 100 MILLIGRAM(S): at 13:17

## 2018-03-18 RX ADMIN — MEROPENEM 100 MILLIGRAM(S): 1 INJECTION INTRAVENOUS at 05:16

## 2018-03-18 RX ADMIN — MORPHINE SULFATE 2 MILLIGRAM(S): 50 CAPSULE, EXTENDED RELEASE ORAL at 16:18

## 2018-03-18 RX ADMIN — Medication 2: at 11:05

## 2018-03-18 RX ADMIN — HEPARIN SODIUM 5000 UNIT(S): 5000 INJECTION INTRAVENOUS; SUBCUTANEOUS at 17:39

## 2018-03-18 RX ADMIN — MORPHINE SULFATE 2 MILLIGRAM(S): 50 CAPSULE, EXTENDED RELEASE ORAL at 12:00

## 2018-03-18 RX ADMIN — MIDODRINE HYDROCHLORIDE 10 MILLIGRAM(S): 2.5 TABLET ORAL at 05:17

## 2018-03-18 RX ADMIN — FLUCONAZOLE 100 MILLIGRAM(S): 150 TABLET ORAL at 21:56

## 2018-03-18 RX ADMIN — MORPHINE SULFATE 2 MILLIGRAM(S): 50 CAPSULE, EXTENDED RELEASE ORAL at 22:11

## 2018-03-18 RX ADMIN — Medication 2: at 23:31

## 2018-03-18 RX ADMIN — MEROPENEM 100 MILLIGRAM(S): 1 INJECTION INTRAVENOUS at 17:39

## 2018-03-18 RX ADMIN — Medication 19.12 MICROGRAM(S)/KG/MIN: at 05:17

## 2018-03-18 RX ADMIN — MIDODRINE HYDROCHLORIDE 10 MILLIGRAM(S): 2.5 TABLET ORAL at 21:56

## 2018-03-18 RX ADMIN — HEPARIN SODIUM 5000 UNIT(S): 5000 INJECTION INTRAVENOUS; SUBCUTANEOUS at 05:16

## 2018-03-18 RX ADMIN — Medication 100 MILLIGRAM(S): at 05:17

## 2018-03-18 RX ADMIN — Medication 100 MILLIGRAM(S): at 21:56

## 2018-03-18 NOTE — PROGRESS NOTE ADULT - SUBJECTIVE AND OBJECTIVE BOX
Patient is seen and examined at the bed side, remains afebrile and on pressor. The Leukocytosis continues trending down, slowly.         REVIEW OF SYSTEMS: Unable to obtain since patient is nonverbal           ICU Vital Signs Last 24 Hrs  T(C): 37.3 (18 Mar 2018 20:38), Max: 37.3 (18 Mar 2018 20:38)  T(F): 99.2 (18 Mar 2018 20:38), Max: 99.2 (18 Mar 2018 20:38)  HR: 85 (18 Mar 2018 20:30) (74 - 95)  BP: 116/81 (18 Mar 2018 20:30) (67/50 - 133/98)  BP(mean): 92 (18 Mar 2018 20:30) (57 - 109)  ABP: --  ABP(mean): --  RR: 29 (18 Mar 2018 20:30) (6 - 33)  SpO2: 100% (18 Mar 2018 20:30) (90% - 100%)          ALLERGIES: NKDA          PHYSICAL EXAM:  GENERAL: Not in  distress  CVS: s1 and s2 present  RESP: Air entry B/L  GI: Abdomen nondistended and nontender  BACK: Stage IV  decubital ulcer bandage over it  EXT: No pedal edema   CNS: Non verbal and awake          LABS:                        9.0    12.0  )-----------( 294      ( 18 Mar 2018 06:14 )             28.4                           8.4    13.4  )-----------( 272      ( 17 Mar 2018 06:13 )             27.6                           8.3    16.7  )-----------( 223      ( 16 Mar 2018 07:17 )             26.1                            10.1   25.8  )-----------( 354      ( 12 Mar 2018 06:44 )             34.3           03-18    147<H>  |  115<H>  |  24<H>  ----------------------------<  102<H>  4.8   |  26  |  0.64    Ca    8.5      18 Mar 2018 06:14  Phos  2.7     03-18  Mg     2.2     03-18    TPro  5.9<L>  /  Alb  2.2<L>  /  TBili  0.3  /  DBili  x   /  AST  17  /  ALT  23  /  AlkPhos  67  03-18    03-17    141  |  110<H>  |  26<H>  ----------------------------<  150<H>  4.5   |  21<L>  |  0.64    Ca    8.0<L>      17 Mar 2018 06:13  Phos  3.2       Mg     2.0                 TPro  6.5  /  Alb  1.6<L>  /  TBili  0.2  /  DBili  x   /  AST  28  /  ALT  29  /  AlkPhos  69  03-11             Urinalysis Basic - ( 11 Mar 2018 13:40 )    Color: Yellow / Appearance: Turbid / S.010 / pH: x  Gluc: x / Ketone: Negative  / Bili: Small / Urobili: 1   Blood: x / Protein: 100 / Nitrite: Positive   Leuk Esterase: Moderate / RBC: 5-10 /HPF / WBC >50 /HPF   Sq Epi: x / Non Sq Epi: Occasional /HPF / Bacteria: TNTC /HPF      CAPILLARY BLOOD GLUCOSE      POCT Blood Glucose.: 100 mg/dL (11 Mar 2018 19:52)  POCT Blood Glucose.: 125 mg/dL (11 Mar 2018 18:56)  POCT Blood Glucose.: 167 mg/dL (11 Mar 2018 18:02)  POCT Blood Glucose.: 222 mg/dL (11 Mar 2018 17:14)  POCT Blood Glucose.: 200 mg/dL (11 Mar 2018 17:12)  POCT Blood Glucose.: 422 mg/dL (11 Mar 2018 15:02)  POCT Blood Glucose.: 467 mg/dL (11 Mar 2018 13:41)  POCT Blood Glucose.: 458 mg/dL (11 Mar 2018 11:42)    ABG - ( 11 Mar 2018 12:52 )  pH: 7.28  /  pCO2: 32    /  pO2: 49    / HCO3: 14    / Base Excess: -11.2 /  SaO2: 78              MEDICATIONS  (STANDING):  chlorhexidine 2% Cloths 1 Application(s) Topical daily  clindamycin IVPB 900 milliGRAM(s) IV Intermittent every 8 hours  clindamycin IVPB      dextrose 50% Injectable 12.5 Gram(s) IV Push once  dextrose 50% Injectable 25 Gram(s) IV Push once  dextrose 50% Injectable 25 Gram(s) IV Push once  fluconAZOLE IVPB      fluconAZOLE IVPB 200 milliGRAM(s) IV Intermittent every 24 hours  heparin  Injectable 5000 Unit(s) SubCutaneous every 12 hours  insulin glargine Injectable (LANTUS) 5 Unit(s) SubCutaneous at bedtime  insulin lispro (HumaLOG) corrective regimen sliding scale   SubCutaneous every 6 hours  meropenem  IVPB 500 milliGRAM(s) IV Intermittent every 12 hours  midodrine 10 milliGRAM(s) Oral every 8 hours  morphine  - Injectable 2 milliGRAM(s) IV Push every 4 hours  norepinephrine Infusion 0.5 MICROgram(s)/kG/Min (19.125 mL/Hr) IV Continuous <Continuous>  pantoprazole  Injectable 40 milliGRAM(s) IV Push daily    MEDICATIONS  (PRN):  dextrose Gel 1 Dose(s) Oral once PRN Blood Glucose LESS THAN 70 milliGRAM(s)/deciliter  glucagon  Injectable 1 milliGRAM(s) IntraMuscular once PRN Glucose LESS THAN 70 milligrams/deciliter          RADIOLOGY & ADDITIONAL TESTS:    3/13/18 : Xray Chest 1 View- PORTABLE-Urgent (18 @ 13:17) Film rotated to the right. Lung visualization is difficult. Right jugular line remains. Heart size cannot be assessed.  There is no significant infiltrate at the left base new since . Right base difficult to assess. An NG tube has also been inserted.        3/11/18 CT Abdomen and Pelvis No Cont (18 @ 15:51) Sacral decubitus ulcer with suspected osteomyelitis and possible intramuscular left gluteal abscesses.          MICROBIOLOGY DATA:    Culture - Other (18 @ 00:19)    -  Amikacin: S <=8    -  Ampicillin: S <=2    -  Ampicillin/Sulbactam: S <=4/2    -  Aztreonam: S <=4    -  Cefazolin: S <=2    -  Cefepime: S <=2    -  Cefoxitin: S <=4    -  Ceftazidime: S <=1    -  Ceftriaxone: S <=1    -  Ciprofloxacin: R >2    -  Ertapenem: S <=0.5    -  Gentamicin: S <=1    -  Levofloxacin: R >4    -  Meropenem: S <=1    -  Piperacillin/Tazobactam: S <=8    -  Tobramycin: S <=2    -  Trimethoprim/Sulfamethoxazole: S <=0.5/9.5    Specimen Source: Skin decubitus wound    Culture Results:   Few Proteus mirabilis  Numerous Candida albicans    Organism Identification: Proteus mirabilis    Organism: Proteus mirabilis    Method Type: RONNA        Culture - Blood (03.15.18 @ 11:15)    Specimen Source: .Blood Blood-Peripheral    Culture Results:   No growth to date.    Culture - Blood (03.15.18 @ 11:14)    Specimen Source: .Blood Blood-Peripheral    Culture Results:   No growth to date.        Culture - Blood (18 @ 21:58)    Gram Stain:   Growth in anaerobic bottle: Gram Negative Rods    Specimen Source: .Blood Blood-Peripheral    Culture Results:   Growth in anaerobic bottle: Clostridium ramosum  "Susceptibilities not performed"        Culture - Urine (18 @ 21:38)    -  Amikacin: S <=8    -  Ampicillin: S <=2    -  Ampicillin/Sulbactam: S <=4/2    -  Aztreonam: S <=4    -  Cefazolin: S <=2    -  Cefepime: S <=2    -  Cefoxitin: S <=4    -  Ceftazidime: S <=1    -  Ceftriaxone: S <=1    -  Ciprofloxacin: R >2    -  Ertapenem: S <=0.5    -  Gentamicin: S 2    -  Levofloxacin: R >4    -  Meropenem: S <=1    -  Nitrofurantoin: R 64    -  Piperacillin/Tazobactam: S <=8    -  Tobramycin: S <=2    -  Trimethoprim/Sulfamethoxazole: R >2/38    Specimen Source: .Urine Catheterized    Culture Results:   >100,000 CFU/ml Proteus mirabilis    Organism Identification: Proteus mirabilis    Organism: Proteus mirabilis    Method Type: RONNA          Culture - Blood (18 @ 21:58)    -  Multidrug (KPC pos) resistant organism: Nondet    -  Staphylococcus aureus: Nondet    -  Methicillin resistant Staphylococcus aureus (MRSA): Nondet    -  Coagulase negative Staphylococcus: Nondet    -  Enterococcus species: Nondet    -  Vancomycin resistant Enterococcus sp.: Nondet    -  Escherichia coli: Nondet    -  Klebsiella oxytoca: Nondet    -  Klebsiella pneumoniae: Nondet    -  Serratia marcescens: Nondet    -  Haemophilus influenzae: Nondet    -  Listeria monocytogenes: Nondet    -  Neisseria meningitidis: Nondet    -  Pseudomonas aeruginosa: Nondet    -  Acinetobacter baumanii: Nondet    -  Enterobacter cloacae complex: Nondet    -  Streptococcus sp. (Not Grp A, B or S pneumoniae): Nondet    -  Streptococcus agalactiae (Group B): Nondet    -  Streptococcus pyogenes (Group A): Nondet    -  Streptococcus pneumoniae: Nondet    -  Candida albicans: Nondet    -  Candida glabrata: Nondet    -  Candida krusei: Nondet  Culture - Blood (18 @ 21:58)    Gram Stain:   Growth in anaerobic bottle: Gram Negative Rods    Specimen Source: .Blood Blood-Peripheral    Culture Results:   Growth in anaerobic bottle: Gram Negative Rods      -  Candida parapsilosis: Nondet    -  Candida tropicalis: Nondet    Gram Stain:   Growth in anaerobic bottle: Gram Negative Rods    Specimen Source: .Blood Blood-Peripheral    Organism: Blood Culture PCR    Culture Results:   Growth in anaerobic bottle: Gram Negative Rods  "Due to technical problems, Proteus sp. will Not be reported as part of  the BCID panel until further notice"  ***Blood Panel PCR results on this specimen are available  approximately 3 hours after the Gram stain result.***  Gram stain, PCR, and/or culture results may not always  correspond due to difference in methodologies.  ************************************************************  This PCR assay was performed using [employer].  The following targets are tested for: Enterococcus,  vancomycin resistant enterococci, Listeria monocytogenes,  coagulase negative staphylococci, S. aureus,  methicillin resistant S. aureus, Streptococcus agalactiae  (Group B), S. pneumoniae, S. pyogenes (Group A),  Acinetobacter baumannii, Enterobacter cloacae, E. coli,  Klebsiella oxytoca, K. pneumoniae, Proteus sp.,  Serratia marcescens, Haemophilus influenzae,  Neisseria meningitidis, Pseudomonas aeruginosa, Candida  albicans, C. glabrata, C krusei, C parapsilosis,  C. tropicalis and the KPC resistance gene.    Organism Identification: Blood Culture PCR    Method Type: PCR    Rapid Respiratory Viral Panel (18 @ 14:03)    Rapid RVP Result: NotDetec: The [employer] RVP Rapid uses polymerase chain reaction (PCR) and melt  curve analysis to screen for adenovirus; coronavirus HKU1, NL63, 229E,  OC43; human metapneumovirus (hMPV); human enterovirus/rhinovirus  (Entero/RV); influenza A; influenza A/H1;influenza A/H3; influenza  A/H1-2009; influenza B; parainfluenza viruses 1, 2, 3, 4; respiratory  syncytial virus; Bordetella pertussis; Mycoplasma pneumoniae; and  Chlamydophila pneumoniae.

## 2018-03-18 NOTE — PROGRESS NOTE ADULT - ASSESSMENT
Patient is a 83y old  Female with multiple co-morbidities including  Dementia, bedbound  and DM2, has sent in to the ER from Nursing Home for evaluation of acute renal failure with hyperkalemia, hyperosmolar hyperglycemic state secondary to diabetes and sepsis (11 Mar 2018 14:55). As per daughters at bedside she has been getting progressively less interactive in the past month and has been eating very little.  In the ER, she found to be afebrile but tachycardic and hypotensive to 90/56. The labs significant for positive Urine analysis, Leukocytosis, lactate of 7.3 and  Blood glucose of 584, elevated creatinine  of 5.15. 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 She has admitted to ICU since requiring pressor and started on broad spectrum antibiotics, cultures pending. The ID consult requested to assist with further evaluation and antibiotic  management.    # Septic shock - on pressor  # GRAM negative bacteremia- Clostridium ramosum  # Infected Decubitus ulcer with Left gluteal  Abscess and Osteomyelitis- s/p surgical debridement 3/16- culture grew Proteus and Candida  # UTI - proteus    Would recommend:  1. Continue Fluconazole and current antimicrobials   2. Monitor WBC count, continues trending down  3. Titrate down pressors as tolerated  4. Continue Wound care   5. Frequent repositioning  6. Aspiration precaution    d/w ICU team    will follow the patient with you

## 2018-03-18 NOTE — PROGRESS NOTE ADULT - SUBJECTIVE AND OBJECTIVE BOX
INTERVAL HPI/OVERNIGHT EVENTS: hasn't required levo anymore, tapered after debridement    PRESSORS: [x ] YES [ ] NO  WHICH: levophed     ANTIBIOTICS:    clinda              DATE STARTED:3/14  ANTIBIOTICS:    myriam              DATE STARTED:3/11  ANTIBIOTICS:                  DATE STARTED:    Antimicrobial:  clindamycin IVPB 900 milliGRAM(s) IV Intermittent every 8 hours  clindamycin IVPB      fluconAZOLE IVPB      fluconAZOLE IVPB 200 milliGRAM(s) IV Intermittent every 24 hours  meropenem  IVPB 500 milliGRAM(s) IV Intermittent every 12 hours    Cardiovascular:  midodrine 10 milliGRAM(s) Oral every 8 hours  norepinephrine Infusion 0.5 MICROgram(s)/kG/Min IV Continuous <Continuous>    Pulmonary:    Hematalogic:  heparin  Injectable 5000 Unit(s) SubCutaneous every 12 hours    Other:  chlorhexidine 2% Cloths 1 Application(s) Topical daily  dextrose 50% Injectable 12.5 Gram(s) IV Push once  dextrose 50% Injectable 25 Gram(s) IV Push once  dextrose 50% Injectable 25 Gram(s) IV Push once  dextrose Gel 1 Dose(s) Oral once PRN  glucagon  Injectable 1 milliGRAM(s) IntraMuscular once PRN  insulin glargine Injectable (LANTUS) 6 Unit(s) SubCutaneous at bedtime  insulin lispro (HumaLOG) corrective regimen sliding scale   SubCutaneous every 6 hours  morphine  - Injectable 2 milliGRAM(s) IV Push every 4 hours  pantoprazole  Injectable 40 milliGRAM(s) IV Push daily    chlorhexidine 2% Cloths 1 Application(s) Topical daily  clindamycin IVPB 900 milliGRAM(s) IV Intermittent every 8 hours  clindamycin IVPB      dextrose 50% Injectable 12.5 Gram(s) IV Push once  dextrose 50% Injectable 25 Gram(s) IV Push once  dextrose 50% Injectable 25 Gram(s) IV Push once  dextrose Gel 1 Dose(s) Oral once PRN  fluconAZOLE IVPB      fluconAZOLE IVPB 200 milliGRAM(s) IV Intermittent every 24 hours  glucagon  Injectable 1 milliGRAM(s) IntraMuscular once PRN  heparin  Injectable 5000 Unit(s) SubCutaneous every 12 hours  insulin glargine Injectable (LANTUS) 6 Unit(s) SubCutaneous at bedtime  insulin lispro (HumaLOG) corrective regimen sliding scale   SubCutaneous every 6 hours  meropenem  IVPB 500 milliGRAM(s) IV Intermittent every 12 hours  midodrine 10 milliGRAM(s) Oral every 8 hours  morphine  - Injectable 2 milliGRAM(s) IV Push every 4 hours  norepinephrine Infusion 0.5 MICROgram(s)/kG/Min IV Continuous <Continuous>  pantoprazole  Injectable 40 milliGRAM(s) IV Push daily    Drug Dosing Weight  Height (cm): 124.46 (16 May 2017 20:03)  Weight (kg): 46.4 (15 Mar 2018 08:00)  BMI (kg/m2): 30 (15 Mar 2018 08:00)  BSA (m2): 1.21 (15 Mar 2018 08:00)    CENTRAL LINE: [x ] YES [ ] NO  LOCATION: [facility]  DATE INSERTED: 03/12/2018  REMOVE: [ ] YES [ ] NO  EXPLAIN:    BANKS: [ x] YES [ ] NO    DATE INSERTED:  REMOVE:  [ ] YES [ ] NO  EXPLAIN:    A-LINE:  [ ] YES [x ] NO  LOCATION:   DATE INSERTED:  REMOVE:  [ ] YES [ ] NO  EXPLAIN:    PMH -reviewed admission note, no change since admission  PAST MEDICAL & SURGICAL HISTORY:  PAD (peripheral artery disease)  Overactive bladder  GERD (gastroesophageal reflux disease)  Dyslipidemia  HTN (hypertension)  Pancreatitis  Dementia  Diabetes mellitus, type 2  Lung nodule: Right Lung Surgery  - 1/2014  History of tonsillectomy  PAD (peripheral artery disease): s/p left lower extremity stent  History of knee replacement, total, bilateral      ICU Vital Signs Last 24 Hrs  T(C): 37.1 (18 Mar 2018 00:15), Max: 37.1 (17 Mar 2018 21:00)  T(F): 98.8 (18 Mar 2018 00:15), Max: 98.8 (18 Mar 2018 00:15)  HR: 87 (18 Mar 2018 05:00) (68 - 100)  BP: 67/48 (18 Mar 2018 05:00) (67/48 - 133/98)  BP(mean): 55 (18 Mar 2018 05:00) (49 - 109)  ABP: --  ABP(mean): --  RR: 8 (18 Mar 2018 05:00) (6 - 19)  SpO2: 99% (18 Mar 2018 05:00) (92% - 100%)            03-16 @ 07:01  -  03-17 @ 07:00  --------------------------------------------------------  IN: 3225.7 mL / OUT: 825 mL / NET: 2400.7 mL            PHYSICAL EXAM:    GENERAL: [x ]NAD, [x ]well-groomed, [x ]well-developed  HEAD:  [ x]Atraumatic, [x ]Normocephalic  EYES: [x ]EOMI, [x ]PERRLA, [x ]conjunctiva and sclera clear  ENMT: [x ]No tonsillar erythema, exudates, or enlargement; [x ]Moist mucous membranes, [ ]Good dentition, [ ]No lesions  NECK: [x ]Supple, normal appearance, [x ]No JVD; [x ]Normal thyroid; [ ]Trachea midline  NERVOUS SYSTEM:  [x ]Alert & Oriented X0, [x] unable to access strength  CHEST/LUNG: [x]No chest deformity; [x]Normal percussion bilaterally; [ ]No rales, rhonchi, wheezing   HEART: [x]Regular rate and rhythm; [x]No murmurs, rubs, or gallops  ABDOMEN: [x]Soft, Nontender, Nondistended; [ ]Bowel sounds present  EXTREMITIES:  [x]2+ Peripheral Pulses, [ ]No clubbing, cyanosis, or edema  LYMPH: [x]No lymphadenopathy noted  SKIN: [x]No rashes or lesions; [ ]Good capillary refill  BACK: 8x10 decubitus ulcer s/p debridement      LABS:  CBC Full  -  ( 17 Mar 2018 06:13 )  WBC Count : 13.4 K/uL  Hemoglobin : 8.4 g/dL  Hematocrit : 27.6 %  Platelet Count - Automated : 272 K/uL  Mean Cell Volume : 81.9 fl  Mean Cell Hemoglobin : 24.9 pg  Mean Cell Hemoglobin Concentration : 30.4 gm/dL  Auto Neutrophil # : 11.3 K/uL  Auto Lymphocyte # : 1.5 K/uL  Auto Monocyte # : 0.6 K/uL  Auto Eosinophil # : 0.1 K/uL  Auto Basophil # : 0.0 K/uL  Auto Neutrophil % : 84.0 %  Auto Lymphocyte % : 11.0 %  Auto Monocyte % : 4.2 %  Auto Eosinophil % : 0.6 %  Auto Basophil % : 0.2 %    03-17    141  |  110<H>  |  26<H>  ----------------------------<  150<H>  4.5   |  21<L>  |  0.64    Ca    8.0<L>      17 Mar 2018 06:13  Phos  3.2     03-17  Mg     2.0     03-17      PT/INR - ( 16 Mar 2018 07:17 )   PT: 12.9 sec;   INR: 1.18 ratio             Culture Results:   Rare Proteus mirabilis  Few Candida albicans (03-17 @ 00:39)  Culture Results:   Few Proteus mirabilis  Numerous Candida albicans (03-16 @ 00:19)  Culture Results:   No growth to date. (03-15 @ 11:15)  Culture Results:   No growth to date. (03-15 @ 11:14)      RADIOLOGY & ADDITIONAL STUDIES REVIEWED:  ***    []GOALS OF CARE DISCUSSION WITH PATIENT/FAMILY/PROXY:    CRITICAL CARE TIME SPENT: 35 minutes

## 2018-03-19 ENCOUNTER — TRANSCRIPTION ENCOUNTER (OUTPATIENT)
Age: 83
End: 2018-03-19

## 2018-03-19 LAB
ALBUMIN SERPL ELPH-MCNC: 1.9 G/DL — LOW (ref 3.5–5)
ALP SERPL-CCNC: 68 U/L — SIGNIFICANT CHANGE UP (ref 40–120)
ALT FLD-CCNC: 20 U/L DA — SIGNIFICANT CHANGE UP (ref 10–60)
ANION GAP SERPL CALC-SCNC: 9 MMOL/L — SIGNIFICANT CHANGE UP (ref 5–17)
AST SERPL-CCNC: 16 U/L — SIGNIFICANT CHANGE UP (ref 10–40)
BASOPHILS # BLD AUTO: 0.1 K/UL — SIGNIFICANT CHANGE UP (ref 0–0.2)
BASOPHILS NFR BLD AUTO: 0.7 % — SIGNIFICANT CHANGE UP (ref 0–2)
BILIRUB SERPL-MCNC: 0.3 MG/DL — SIGNIFICANT CHANGE UP (ref 0.2–1.2)
BUN SERPL-MCNC: 21 MG/DL — HIGH (ref 7–18)
CALCIUM SERPL-MCNC: 8.7 MG/DL — SIGNIFICANT CHANGE UP (ref 8.4–10.5)
CHLORIDE SERPL-SCNC: 111 MMOL/L — HIGH (ref 96–108)
CO2 SERPL-SCNC: 26 MMOL/L — SIGNIFICANT CHANGE UP (ref 22–31)
CREAT SERPL-MCNC: 0.68 MG/DL — SIGNIFICANT CHANGE UP (ref 0.5–1.3)
EOSINOPHIL # BLD AUTO: 0.2 K/UL — SIGNIFICANT CHANGE UP (ref 0–0.5)
EOSINOPHIL NFR BLD AUTO: 2 % — SIGNIFICANT CHANGE UP (ref 0–6)
GLUCOSE SERPL-MCNC: 119 MG/DL — HIGH (ref 70–99)
HCT VFR BLD CALC: 28.1 % — LOW (ref 34.5–45)
HGB BLD-MCNC: 8.6 G/DL — LOW (ref 11.5–15.5)
LYMPHOCYTES # BLD AUTO: 1.4 K/UL — SIGNIFICANT CHANGE UP (ref 1–3.3)
LYMPHOCYTES # BLD AUTO: 12.1 % — LOW (ref 13–44)
MAGNESIUM SERPL-MCNC: 2.1 MG/DL — SIGNIFICANT CHANGE UP (ref 1.6–2.6)
MCHC RBC-ENTMCNC: 25 PG — LOW (ref 27–34)
MCHC RBC-ENTMCNC: 30.5 GM/DL — LOW (ref 32–36)
MCV RBC AUTO: 82.2 FL — SIGNIFICANT CHANGE UP (ref 80–100)
MONOCYTES # BLD AUTO: 0.5 K/UL — SIGNIFICANT CHANGE UP (ref 0–0.9)
MONOCYTES NFR BLD AUTO: 4.4 % — SIGNIFICANT CHANGE UP (ref 2–14)
NEUTROPHILS # BLD AUTO: 9.6 K/UL — HIGH (ref 1.8–7.4)
NEUTROPHILS NFR BLD AUTO: 80.8 % — HIGH (ref 43–77)
PHOSPHATE SERPL-MCNC: 2.6 MG/DL — SIGNIFICANT CHANGE UP (ref 2.5–4.5)
PLATELET # BLD AUTO: 307 K/UL — SIGNIFICANT CHANGE UP (ref 150–400)
POTASSIUM SERPL-MCNC: 4.9 MMOL/L — SIGNIFICANT CHANGE UP (ref 3.5–5.3)
POTASSIUM SERPL-SCNC: 4.9 MMOL/L — SIGNIFICANT CHANGE UP (ref 3.5–5.3)
PROT SERPL-MCNC: 5.9 G/DL — LOW (ref 6–8.3)
RBC # BLD: 3.41 M/UL — LOW (ref 3.8–5.2)
RBC # FLD: 14.4 % — SIGNIFICANT CHANGE UP (ref 10.3–14.5)
SODIUM SERPL-SCNC: 146 MMOL/L — HIGH (ref 135–145)
WBC # BLD: 11.8 K/UL — HIGH (ref 3.8–10.5)
WBC # FLD AUTO: 11.8 K/UL — HIGH (ref 3.8–10.5)

## 2018-03-19 PROCEDURE — 99233 SBSQ HOSP IP/OBS HIGH 50: CPT

## 2018-03-19 RX ORDER — FLUCONAZOLE 150 MG/1
0 TABLET ORAL
Qty: 0 | Refills: 0 | COMMUNITY
Start: 2018-03-19

## 2018-03-19 RX ORDER — MEROPENEM 1 G/30ML
500 INJECTION INTRAVENOUS
Qty: 0 | Refills: 0 | COMMUNITY
Start: 2018-03-19

## 2018-03-19 RX ORDER — METFORMIN HYDROCHLORIDE 850 MG/1
0.5 TABLET ORAL
Qty: 0 | Refills: 0 | COMMUNITY

## 2018-03-19 RX ORDER — MORPHINE SULFATE 50 MG/1
0 CAPSULE, EXTENDED RELEASE ORAL
Qty: 0 | Refills: 0 | COMMUNITY
Start: 2018-03-19

## 2018-03-19 RX ADMIN — Medication 100 MILLIGRAM(S): at 05:17

## 2018-03-19 RX ADMIN — MEROPENEM 100 MILLIGRAM(S): 1 INJECTION INTRAVENOUS at 18:00

## 2018-03-19 RX ADMIN — MIDODRINE HYDROCHLORIDE 10 MILLIGRAM(S): 2.5 TABLET ORAL at 05:18

## 2018-03-19 RX ADMIN — MIDODRINE HYDROCHLORIDE 10 MILLIGRAM(S): 2.5 TABLET ORAL at 22:58

## 2018-03-19 RX ADMIN — MIDODRINE HYDROCHLORIDE 10 MILLIGRAM(S): 2.5 TABLET ORAL at 14:02

## 2018-03-19 RX ADMIN — PANTOPRAZOLE SODIUM 40 MILLIGRAM(S): 20 TABLET, DELAYED RELEASE ORAL at 12:35

## 2018-03-19 RX ADMIN — HEPARIN SODIUM 5000 UNIT(S): 5000 INJECTION INTRAVENOUS; SUBCUTANEOUS at 18:00

## 2018-03-19 RX ADMIN — CHLORHEXIDINE GLUCONATE 1 APPLICATION(S): 213 SOLUTION TOPICAL at 12:00

## 2018-03-19 RX ADMIN — MORPHINE SULFATE 2 MILLIGRAM(S): 50 CAPSULE, EXTENDED RELEASE ORAL at 18:16

## 2018-03-19 RX ADMIN — MORPHINE SULFATE 2 MILLIGRAM(S): 50 CAPSULE, EXTENDED RELEASE ORAL at 05:21

## 2018-03-19 RX ADMIN — MORPHINE SULFATE 2 MILLIGRAM(S): 50 CAPSULE, EXTENDED RELEASE ORAL at 22:58

## 2018-03-19 RX ADMIN — MORPHINE SULFATE 2 MILLIGRAM(S): 50 CAPSULE, EXTENDED RELEASE ORAL at 23:15

## 2018-03-19 RX ADMIN — MORPHINE SULFATE 2 MILLIGRAM(S): 50 CAPSULE, EXTENDED RELEASE ORAL at 10:30

## 2018-03-19 RX ADMIN — MORPHINE SULFATE 2 MILLIGRAM(S): 50 CAPSULE, EXTENDED RELEASE ORAL at 18:19

## 2018-03-19 RX ADMIN — MORPHINE SULFATE 2 MILLIGRAM(S): 50 CAPSULE, EXTENDED RELEASE ORAL at 06:15

## 2018-03-19 RX ADMIN — INSULIN GLARGINE 5 UNIT(S): 100 INJECTION, SOLUTION SUBCUTANEOUS at 22:57

## 2018-03-19 RX ADMIN — Medication 100 MILLIGRAM(S): at 14:00

## 2018-03-19 RX ADMIN — Medication 2: at 05:17

## 2018-03-19 RX ADMIN — MORPHINE SULFATE 2 MILLIGRAM(S): 50 CAPSULE, EXTENDED RELEASE ORAL at 10:00

## 2018-03-19 RX ADMIN — FLUCONAZOLE 100 MILLIGRAM(S): 150 TABLET ORAL at 22:57

## 2018-03-19 RX ADMIN — MEROPENEM 100 MILLIGRAM(S): 1 INJECTION INTRAVENOUS at 05:16

## 2018-03-19 RX ADMIN — HEPARIN SODIUM 5000 UNIT(S): 5000 INJECTION INTRAVENOUS; SUBCUTANEOUS at 05:18

## 2018-03-19 RX ADMIN — Medication 100 MILLIGRAM(S): at 22:57

## 2018-03-19 NOTE — PROGRESS NOTE ADULT - PROBLEM SELECTOR PROBLEM 4
Functional quadriplegia
Functional quadriplegia
Hypernatremia
Functional quadriplegia

## 2018-03-19 NOTE — PROGRESS NOTE ADULT - ATTENDING COMMENTS
83 f from NH was sent in for hyperglycemia and failure to thrive . As per daughters at bedside she has been getting progressively less interactive in the past month and has been eating very little. Patient is non verbal and arousable . She is not following any commands. In the ed patient is afebrile but tachycardic and hypotensive to 90/56. Labs are significant for elevated white count of 27 with left shift, lactate of 7.3 . Blood sugars on admission are 584 with low serum bicarbonate and increased anion gap . Creatinine is 5.15 and pottasium is 5.5 . Abg shows acidosis and hypoxia ; 7.28 ph and po2 of 49 on RA .Icu consulted for acute renal failure with hyperkalemia, hyperosmolar hyperglycemic state secondary to diabetes and sepsis        Problem/Plan - 1:  ·  Problem: Sepsis.  Plan: Secondary to UTI and infected sacral ulcer   • continue with  meropenem [renally adjusted, allergic to pencillin] and started on vanco for clostridium ramosum  clindamycin added as per ID recommendation for unknown organism in   - follow up with surgery  - possible gluteal abcess noted on CT abdomen pelvis  blood culture gram negative rods in anaerobic bottle- clostridium ramosum and urine culture shows proteus - follow final results.      Problem/Plan - 2:  ·  Problem: Hyperosmolar non-ketotic state in patient with type 2 diabetes mellitus.  Plan: secondary to sepsis   discontinued insulin drip and started on lantus 6 units with sliding scale   follow sugars closely.      Problem/Plan - 3:  ·  Problem: Acute renal failure.  Plan: improved with hydration.      Problem/Plan - 4:  ·  Problem: Hypernatremia.  Plan: improved to 161->151->156->154->153->150->147  continue with 250 free fluids q6.      Problem/Plan - 5:  ·  Problem: Failure to thrive syndrome, adult.  Plan: Patient looks cachectic with temporal wasting , decreased po intake, decreased appetite, scaphoid abdomen and unstageable sacral ulcers  •Palliative consult for possible hospice.      Problem/Plan - 6:  Problem: Need for prophylactic measure. Plan: Dvt & gi prophylaxis- improve score 2 for age and immobilization. Continue heparin sc and protonix.     Problem/Plan - 7:  ·  Problem: Goals of care, counseling/discussion.  Plan: patient is DNR/DNI.     septic shock on two pressors to maintain BP  will add mitodrine and albumin , taper pressors as tolerated  -spoke with hospice for possible impatient care
83 f from NH was sent in for hyperglycemia and failure to thrive . As per daughters at bedside she has been getting progressively less interactive in the past month and has been eating very little. Patient is non verbal and arousable . She is not following any commands. In the ed patient is afebrile but tachycardic and hypotensive to 90/56. Labs are significant for elevated white count of 27 with left shift, lactate of 7.3 . Blood sugars on admission are 584 with low serum bicarbonate and increased anion gap . Creatinine is 5.15 and pottasium is 5.5 . Abg shows acidosis and hypoxia ; 7.28 ph and po2 of 49 on RA .Icu consulted for acute renal failure with hyperkalemia, hyperosmolar hyperglycemic state secondary to diabetes and sepsis        Problem/Plan - 1:  ·  Problem: Septic shock .  Plan: Secondary to UTI and infected sacral ulcer   patient recevied 6 L bolus now on pressors  • started on meropenem [renally adjusted, allergic to pencillin ] and levoflox to cover for uti and sacral wound . Dr Garcia consulted   - surgery consulted for decubitus ulcer- will need surgery when stable  - lactate normalized   - possible gluteal abcess noted on CT abdomen pelvis  blood culture gram negative rods in anaerobic bottle and urine culture shows proteus - follow final results.      Problem/Plan - 2:  ·  Problem: Hyperosmolar non-ketotic state in patient with type 2 diabetes mellitus.  Plan: secondary to sepsis   discontinued insulin drip and started on lantus 6 units with sliding scale   follow sugars closely.      Problem/Plan - 3:  ·  Problem: Acute renal failure.  Plan: cr on admission is 5.15 /153  likely form dehydration   improving with hydration.      Problem/Plan - 4:  ·  Problem: Hypernatremia.  Plan: improved to 161->151->156->154  on D5 @ 60.      Problem/Plan - 5:  ·  Problem: Failure to thrive syndrome, adult.  Plan: Patient looks cachectic with temporal wasting , decreased po intake, decreased appetite, scaphoid abdomen and unstageable sacral ulcers  •Palliative consult for possible hospice.      Problem/Plan - 6:  Problem: Need for prophylactic measure. Plan: Dvt & gi prophylaxis- improve score 2 for age and immobilization. Continue heparin sc and protonix.     Problem/Plan - 7:  ·  Problem: Goals of care, counseling/discussion.  Plan: Palliative consult for possible hospice   •Discussed goals of care includes prognosis & cpr in detail.   •Family not ready to make any desicion at this time . Remains full code.
83 male with dementia, bedbound, presented with hyperglycemia, large infected decub ulcer, bacteremia (clostridium), septic shock on pressor possible UTI, planned for bedside debridedment today.   Family may want to transition to inpatient hospice.
83 male with dementia, bedbound, presented with hyperglycemia, large infected decub ulcer, bacteremia (clostridium), septic shock on pressor possible UTI, underwent bedside debridement 3/16.  Still on pressors, difficult to wean off.  Family may want to transition to inpatient hospice.
83 male with dementia, bedbound, presented with hyperglycemia, large infected decub ulcer, bacteremia (clostridium), septic shock on pressor possible UTI, underwent bedside debridement 3/16.  Still on pressors.   Family may want to transition to inpatient hospice.
83 f from NH was sent in for hyperglycemia and failure to thrive . As per daughters at bedside she has been getting progressively less interactive in the past month and has been eating very little. Patient is non verbal and arousable . She is not following any commands. In the ed patient is afebrile but tachycardic and hypotensive to 90/56. Labs are significant for elevated white count of 27 with left shift, lactate of 7.3 . Blood sugars on admission are 584 with low serum bicarbonate and increased anion gap . Creatinine is 5.15 and pottasium is 5.5 . Abg shows acidosis and hypoxia ; 7.28 ph and po2 of 49 on RA .Icu consulted for acute renal failure with hyperkalemia, hyperosmolar hyperglycemic state secondary to diabetes and sepsis        Problem/Plan - 1:  ·  Problem: Sepsis.  Plan: Secondary to UTI and infected sacral ulcer   •Bp still borderline low after she received 4 L iv fluids   •Continue maintenance fluids   •Will start on meropenem [renally adjusted, allergic to pencillin ] to cover for uti and sacral wound . Dr Garcia consulted   •Needs surgery consult for possible debridement   •F/u repeat lactate and labs   •Will get ct abdomen to evaluate for intra-abdominal pathology   •follow blood & urine cultures.      Problem/Plan - 2:  ·  Problem: Hyperosmolar non-ketotic state in patient with type 2 diabetes mellitus.  Plan: secondary to sepsis   •Continue insulin drip per protocol  •Npo for now   •Bmp q4 and fs q1.      Problem/Plan - 3:  ·  Problem: Acute renal failure.  Plan: cr on admission is 5.15 /153  • Likely pre-renal secondary to dehydration from hyperglycemia , UTI and sepsis   •Very low urine output from pompa placed in ed  •Normal baseline last year   •F/U urine electrolytes & bmp.      Problem/Plan - 4:  ·  Problem: Hypernatremia.  Plan: - Corrected sodium of 164 on admission , Hypovolemic hypernatremia  - free water deficient of 2.8 L   - S/p 4 l NS. + 2 l over night.      Problem/Plan - 5:  ·  Problem: Hyperkalemia.  Plan: Secondary to acute renal failure   •Will not attempt to correct as patient on insulin drip  •Will repeat bmp  •Will give calcium gluconate   •Ekg does not show any changes attributable to hyperkalemia.      Problem/Plan - 6:  Problem: Failure to thrive syndrome, adult. Plan: Patient looks cachectic with temporal wasting , decreased po intake, decreased appetite, scaphoid abdomen and unstageable sacral ulcers  •Palliative consult for possible hospice.     Problem/Plan - 7:  ·  Problem: Need for prophylactic measure.  Plan: Dvt & gi prophylaxis- improve score 2 for age and immobilization. Continue heparin sc and protonix.      Problem/Plan - 8:  ·  Problem: Goals of care, counseling/discussion.  Plan: Palliative consult for possible hospice   •Discussed goals of care includes prognosis & cpr in detail.   •Family not ready to make any desicion at this time . Remains full code.
-continue antibx for clinical osteo, and abscess  -morphine for comfort and pain  -surgical f/u  -c
-family agreed for impatient hosp  -no escalation of care

## 2018-03-19 NOTE — PROGRESS NOTE ADULT - ASSESSMENT
Continue care as per the ICU team. Overall prognosis is very poor despite any medical intervention. Spoke with the palliative care team who has spoken to the family in detail

## 2018-03-19 NOTE — PROGRESS NOTE ADULT - SUBJECTIVE AND OBJECTIVE BOX
INTERVAL HPI/OVERNIGHT EVENTS: ***    PRESSORS: [ ] YES [ ] NO  WHICH:    ANTIBIOTICS:                  DATE STARTED:  ANTIBIOTICS:                  DATE STARTED:  ANTIBIOTICS:                  DATE STARTED:    Antimicrobial:  clindamycin IVPB 900 milliGRAM(s) IV Intermittent every 8 hours  clindamycin IVPB      fluconAZOLE IVPB      fluconAZOLE IVPB 200 milliGRAM(s) IV Intermittent every 24 hours  meropenem  IVPB 500 milliGRAM(s) IV Intermittent every 12 hours    Cardiovascular:  midodrine 10 milliGRAM(s) Oral every 8 hours  norepinephrine Infusion 0.5 MICROgram(s)/kG/Min IV Continuous <Continuous>    Pulmonary:    Hematalogic:  heparin  Injectable 5000 Unit(s) SubCutaneous every 12 hours    Other:  chlorhexidine 2% Cloths 1 Application(s) Topical daily  dextrose 50% Injectable 12.5 Gram(s) IV Push once  dextrose 50% Injectable 25 Gram(s) IV Push once  dextrose 50% Injectable 25 Gram(s) IV Push once  dextrose Gel 1 Dose(s) Oral once PRN  glucagon  Injectable 1 milliGRAM(s) IntraMuscular once PRN  insulin glargine Injectable (LANTUS) 5 Unit(s) SubCutaneous at bedtime  insulin lispro (HumaLOG) corrective regimen sliding scale   SubCutaneous every 6 hours  morphine  - Injectable 2 milliGRAM(s) IV Push every 4 hours  pantoprazole  Injectable 40 milliGRAM(s) IV Push daily    chlorhexidine 2% Cloths 1 Application(s) Topical daily  clindamycin IVPB 900 milliGRAM(s) IV Intermittent every 8 hours  clindamycin IVPB      dextrose 50% Injectable 12.5 Gram(s) IV Push once  dextrose 50% Injectable 25 Gram(s) IV Push once  dextrose 50% Injectable 25 Gram(s) IV Push once  dextrose Gel 1 Dose(s) Oral once PRN  fluconAZOLE IVPB      fluconAZOLE IVPB 200 milliGRAM(s) IV Intermittent every 24 hours  glucagon  Injectable 1 milliGRAM(s) IntraMuscular once PRN  heparin  Injectable 5000 Unit(s) SubCutaneous every 12 hours  insulin glargine Injectable (LANTUS) 5 Unit(s) SubCutaneous at bedtime  insulin lispro (HumaLOG) corrective regimen sliding scale   SubCutaneous every 6 hours  meropenem  IVPB 500 milliGRAM(s) IV Intermittent every 12 hours  midodrine 10 milliGRAM(s) Oral every 8 hours  morphine  - Injectable 2 milliGRAM(s) IV Push every 4 hours  norepinephrine Infusion 0.5 MICROgram(s)/kG/Min IV Continuous <Continuous>  pantoprazole  Injectable 40 milliGRAM(s) IV Push daily    Drug Dosing Weight  Height (cm): 124.46 (16 May 2017 20:03)  Weight (kg): 46.4 (15 Mar 2018 08:00)  BMI (kg/m2): 30 (15 Mar 2018 08:00)  BSA (m2): 1.21 (15 Mar 2018 08:00)    CENTRAL LINE: [ ] YES [ ] NO  LOCATION:   DATE INSERTED:  REMOVE: [ ] YES [ ] NO  EXPLAIN:    BANKS: [ ] YES [ ] NO    DATE INSERTED:  REMOVE:  [ ] YES [ ] NO  EXPLAIN:    A-LINE:  [ ] YES [ ] NO  LOCATION:   DATE INSERTED:  REMOVE:  [ ] YES [ ] NO  EXPLAIN:    PMH -reviewed admission note, no change since admission  Heart faliure: acute [ ] chronic [ ] acute or chronic [ ] diastolic [ ] systolic [ ] combied systolic and diastolic[ ]  MALIKA: ATN[ ] renal medullary necrosis [ ] CKD I [ ]CKDII [ ]CKD III [ ]CKD IV [ ]CKD V [ ]Other pathological lesions [ ]  Abdominal Nutrition Status: malnutrition [ ] cachexia [ ] morbid obesity/BMI=40 [ ] Supplement ordered [___________]     ICU Vital Signs Last 24 Hrs  T(C): 37 (19 Mar 2018 08:00), Max: 37.3 (18 Mar 2018 20:38)  T(F): 98.6 (19 Mar 2018 08:00), Max: 99.2 (18 Mar 2018 20:38)  HR: 78 (19 Mar 2018 07:30) (76 - 95)  BP: 118/70 (19 Mar 2018 08:00) (79/58 - 124/97)  BP(mean): 84 (19 Mar 2018 08:00) (64 - 106)  ABP: --  ABP(mean): --  RR: 13 (19 Mar 2018 08:00) (8 - 33)  SpO2: 100% (19 Mar 2018 08:00) (82% - 100%)            03-18 @ 07:01  -  03-19 @ 07:00  --------------------------------------------------------  IN: 2006.8 mL / OUT: 1980 mL / NET: 26.8 mL            PHYSICAL EXAM:    GENERAL: [ ]NAD, [ ]well-groomed, [ ]well-developed  HEAD:  [ ]Atraumatic, [ ]Normocephalic  EYES: [ ]EOMI, [ ]PERRLA, [ ]conjunctiva and sclera clear  ENMT: [ ]No tonsillar erythema, exudates, or enlargement; [ ]Moist mucous membranes, [ ]Good dentition, [ ]No lesions  NECK: [ ]Supple, normal appearance, [ ]No JVD; [ ]Normal thyroid; [ ]Trachea midline  NERVOUS SYSTEM:  [ ]Alert & Oriented X3, [ ]Good concentration; [ ]Motor Strength 5/5 B/L upper and lower extremities; [ ]DTRs 2+ intact and symmetric  CHEST/LUNG: [ ]No chest deformity; [ ]Normal percussion bilaterally; [ ]No rales, rhonchi, wheezing   HEART: [ ]Regular rate and rhythm; [ ]No murmurs, rubs, or gallops  ABDOMEN: [ ]Soft, Nontender, Nondistended; [ ]Bowel sounds present  EXTREMITIES:  [ ]2+ Peripheral Pulses, [ ]No clubbing, cyanosis, or edema  LYMPH: [ ]No lymphadenopathy noted  SKIN: [ ]No rashes or lesions; [ ]Good capillary refill      LABS:  CBC Full  -  ( 19 Mar 2018 06:50 )  WBC Count : 11.8 K/uL  Hemoglobin : 8.6 g/dL  Hematocrit : 28.1 %  Platelet Count - Automated : 307 K/uL  Mean Cell Volume : 82.2 fl  Mean Cell Hemoglobin : 25.0 pg  Mean Cell Hemoglobin Concentration : 30.5 gm/dL  Auto Neutrophil # : 9.6 K/uL  Auto Lymphocyte # : 1.4 K/uL  Auto Monocyte # : 0.5 K/uL  Auto Eosinophil # : 0.2 K/uL  Auto Basophil # : 0.1 K/uL  Auto Neutrophil % : 80.8 %  Auto Lymphocyte % : 12.1 %  Auto Monocyte % : 4.4 %  Auto Eosinophil % : 2.0 %  Auto Basophil % : 0.7 %    03-19    146<H>  |  111<H>  |  21<H>  ----------------------------<  119<H>  4.9   |  26  |  0.68    Ca    8.7      19 Mar 2018 06:50  Phos  2.6     03-19  Mg     2.1     03-19    TPro  5.9<L>  /  Alb  1.9<L>  /  TBili  0.3  /  DBili  x   /  AST  16  /  ALT  20  /  AlkPhos  68  03-19        Culture Results:   Rare Proteus mirabilis  Few Candida albicans (03-17 @ 00:39)      RADIOLOGY & ADDITIONAL STUDIES REVIEWED:  ***    [ ]GOALS OF CARE DISCUSSION WITH PATIENT/FAMILY/PROXY:    CRITICAL CARE TIME SPENT: 35 minutes INTERVAL HPI/OVERNIGHT EVENTS: ***    PRESSORS: [x ] YES [ ] NO  WHICH: levophed    ANTIBIOTICS: clinda                 DATE STARTED:  ANTIBIOTICS: meropenum                  DATE STARTED:  ANTIBIOTICS:  fluconazole                DATE STARTED:    Antimicrobial:  clindamycin IVPB 900 milliGRAM(s) IV Intermittent every 8 hours  clindamycin IVPB      fluconAZOLE IVPB      fluconAZOLE IVPB 200 milliGRAM(s) IV Intermittent every 24 hours  meropenem  IVPB 500 milliGRAM(s) IV Intermittent every 12 hours    Cardiovascular:  midodrine 10 milliGRAM(s) Oral every 8 hours  norepinephrine Infusion 0.5 MICROgram(s)/kG/Min IV Continuous <Continuous>    Pulmonary:    Hematalogic:  heparin  Injectable 5000 Unit(s) SubCutaneous every 12 hours    Other:  chlorhexidine 2% Cloths 1 Application(s) Topical daily  dextrose 50% Injectable 12.5 Gram(s) IV Push once  dextrose 50% Injectable 25 Gram(s) IV Push once  dextrose 50% Injectable 25 Gram(s) IV Push once  dextrose Gel 1 Dose(s) Oral once PRN  glucagon  Injectable 1 milliGRAM(s) IntraMuscular once PRN  insulin glargine Injectable (LANTUS) 5 Unit(s) SubCutaneous at bedtime  insulin lispro (HumaLOG) corrective regimen sliding scale   SubCutaneous every 6 hours  morphine  - Injectable 2 milliGRAM(s) IV Push every 4 hours  pantoprazole  Injectable 40 milliGRAM(s) IV Push daily    chlorhexidine 2% Cloths 1 Application(s) Topical daily  clindamycin IVPB 900 milliGRAM(s) IV Intermittent every 8 hours  clindamycin IVPB      dextrose 50% Injectable 12.5 Gram(s) IV Push once  dextrose 50% Injectable 25 Gram(s) IV Push once  dextrose 50% Injectable 25 Gram(s) IV Push once  dextrose Gel 1 Dose(s) Oral once PRN  fluconAZOLE IVPB      fluconAZOLE IVPB 200 milliGRAM(s) IV Intermittent every 24 hours  glucagon  Injectable 1 milliGRAM(s) IntraMuscular once PRN  heparin  Injectable 5000 Unit(s) SubCutaneous every 12 hours  insulin glargine Injectable (LANTUS) 5 Unit(s) SubCutaneous at bedtime  insulin lispro (HumaLOG) corrective regimen sliding scale   SubCutaneous every 6 hours  meropenem  IVPB 500 milliGRAM(s) IV Intermittent every 12 hours  midodrine 10 milliGRAM(s) Oral every 8 hours  morphine  - Injectable 2 milliGRAM(s) IV Push every 4 hours  norepinephrine Infusion 0.5 MICROgram(s)/kG/Min IV Continuous <Continuous>  pantoprazole  Injectable 40 milliGRAM(s) IV Push daily    Drug Dosing Weight  Height (cm): 124.46 (16 May 2017 20:03)  Weight (kg): 46.4 (15 Mar 2018 08:00)  BMI (kg/m2): 30 (15 Mar 2018 08:00)  BSA (m2): 1.21 (15 Mar 2018 08:00)    CENTRAL LINE: [x ] YES [ ] NO  LOCATION: [facility]  DATE INSERTED: 03/12  REMOVE: [ ] YES [ ] NO  EXPLAIN:    BANKS: [x ] YES [ ] NO    DATE INSERTED:  REMOVE:  [ ] YES [x ] NO  EXPLAIN: decubitus ulcer - comfort care    A-LINE:  [ ] YES [x ] NO  LOCATION:   DATE INSERTED:  REMOVE:  [ ] YES [ ] NO  EXPLAIN:    PMH -reviewed admission note, no change since admission  Heart faliure: acute [ ] chronic [ ] acute or chronic [ ] diastolic [ ] systolic [ ] combied systolic and diastolic[ ]  MALIKA: ATN[ ] renal medullary necrosis [ ] CKD I [ ]CKDII [ ]CKD III [ ]CKD IV [ ]CKD V [ ]Other pathological lesions [ ]  Abdominal Nutrition Status: malnutrition [ ] cachexia [ ] morbid obesity/BMI=40 [ ] Supplement ordered [___________]     ICU Vital Signs Last 24 Hrs  T(C): 37 (19 Mar 2018 08:00), Max: 37.3 (18 Mar 2018 20:38)  T(F): 98.6 (19 Mar 2018 08:00), Max: 99.2 (18 Mar 2018 20:38)  HR: 78 (19 Mar 2018 07:30) (76 - 95)  BP: 118/70 (19 Mar 2018 08:00) (79/58 - 124/97)  BP(mean): 84 (19 Mar 2018 08:00) (64 - 106)  ABP: --  ABP(mean): --  RR: 13 (19 Mar 2018 08:00) (8 - 33)  SpO2: 100% (19 Mar 2018 08:00) (82% - 100%)            03-18 @ 07:01  -  03-19 @ 07:00  --------------------------------------------------------  IN: 2006.8 mL / OUT: 1980 mL / NET: 26.8 mL            PHYSICAL EXAM:    GENERAL: [x ]NAD, [x ]well-groomed, [x]well-developed  HEAD:  [x ]Atraumatic, [ x]Normocephalic  EYES: [ xx]EOMI, [ ]PERRLA, [ x]conjunctiva and sclera clear  ENMT: [ x]No tonsillar erythema, exudates, or enlargement; [x ]Moist mucous membranes, [ ]Good dentition, [x ]No lesions  NECK: [x ]Supple, normal appearance, [x]No JVD; [ ]Normal thyroid; [ ]Trachea midline  NERVOUS SYSTEM:  [x ]Alert & Oriented X1, [ ]Good concentration; unable to access   CHEST/LUNG: [x ]No chest deformity; [x ]Normal percussion bilaterally; [x ]No rales, rhonchi, wheezing   HEART: [x ]Regular rate and rhythm; [ x]No murmurs, rubs, or gallops  ABDOMEN: [x ]Soft, Nontender, Nondistended; [ ]Bowel sounds present  EXTREMITIES:  [x ]2+ Peripheral Pulses, [x ]No clubbing, cyanosis, or edema  LYMPH: [x ]No lymphadenopathy noted  SKIN: [x ]No rashes or lesions; [ x]Good capillary refill      LABS:  CBC Full  -  ( 19 Mar 2018 06:50 )  WBC Count : 11.8 K/uL  Hemoglobin : 8.6 g/dL  Hematocrit : 28.1 %  Platelet Count - Automated : 307 K/uL  Mean Cell Volume : 82.2 fl  Mean Cell Hemoglobin : 25.0 pg  Mean Cell Hemoglobin Concentration : 30.5 gm/dL  Auto Neutrophil # : 9.6 K/uL  Auto Lymphocyte # : 1.4 K/uL  Auto Monocyte # : 0.5 K/uL  Auto Eosinophil # : 0.2 K/uL  Auto Basophil # : 0.1 K/uL  Auto Neutrophil % : 80.8 %  Auto Lymphocyte % : 12.1 %  Auto Monocyte % : 4.4 %  Auto Eosinophil % : 2.0 %  Auto Basophil % : 0.7 %    03-19    146<H>  |  111<H>  |  21<H>  ----------------------------<  119<H>  4.9   |  26  |  0.68    Ca    8.7      19 Mar 2018 06:50  Phos  2.6     03-19  Mg     2.1     03-19    TPro  5.9<L>  /  Alb  1.9<L>  /  TBili  0.3  /  DBili  x   /  AST  16  /  ALT  20  /  AlkPhos  68  03-19        Culture Results:   Rare Proteus mirabilis  Few Candida albicans (03-17 @ 00:39)      RADIOLOGY & ADDITIONAL STUDIES REVIEWED:  ***    [ x]GOALS OF CARE DISCUSSION WITH PATIENT/FAMILY/PROXY: DNR/ DNI    CRITICAL CARE TIME SPENT: 35 minutes

## 2018-03-19 NOTE — DISCHARGE NOTE ADULT - MEDICATION SUMMARY - MEDICATIONS TO TAKE
I will START or STAY ON the medications listed below when I get home from the hospital:    morphine  -- Indication: For Pain     fluconazole  -- Indication: For infection     meropenem 500 mg intravenous injection  -- 500 milligram(s) intravenous every 12 hours  -- Indication: For infection     clindamycin  -- Indication: For infection

## 2018-03-19 NOTE — DISCHARGE NOTE ADULT - PLAN OF CARE
prevent recurrence patient was sent form nursing home for uncontrollable sugar levels, patient started on insulin drip and switched to lantus and humalog when controlled grade IV decubitus ulcer noted on back around 10x12 cm in size.   wound care consulted and recommended for surgical debridement, which was done at bedside and culture was done which showed proteus and candida albicans - meropenum, clindamycin and fluconazole was started.  CT abdomen pelvis done which showed possible osteomyelitis. secondary to decubitus wound. treated with meropenum, clindamycin and fluconazole  was on levophed to maintain BP.

## 2018-03-19 NOTE — PROGRESS NOTE ADULT - PROBLEM SELECTOR PROBLEM 6
Need for prophylactic measure
Failure to thrive syndrome, adult
Need for prophylactic measure

## 2018-03-19 NOTE — PROGRESS NOTE ADULT - SUBJECTIVE AND OBJECTIVE BOX
OVERNIGHT EVENTS: ongoing titration of pressor, continues ATC morphine    Present Symptoms:   Review of Systems:  Unable to obtain due to poor mentation    MEDICATIONS  (STANDING):  chlorhexidine 2% Cloths 1 Application(s) Topical daily  clindamycin IVPB 900 milliGRAM(s) IV Intermittent every 8 hours  clindamycin IVPB      dextrose 50% Injectable 12.5 Gram(s) IV Push once  dextrose 50% Injectable 25 Gram(s) IV Push once  dextrose 50% Injectable 25 Gram(s) IV Push once  fluconAZOLE IVPB      fluconAZOLE IVPB 200 milliGRAM(s) IV Intermittent every 24 hours  heparin  Injectable 5000 Unit(s) SubCutaneous every 12 hours  insulin glargine Injectable (LANTUS) 5 Unit(s) SubCutaneous at bedtime  insulin lispro (HumaLOG) corrective regimen sliding scale   SubCutaneous every 6 hours  meropenem  IVPB 500 milliGRAM(s) IV Intermittent every 12 hours  midodrine 10 milliGRAM(s) Oral every 8 hours  morphine  - Injectable 2 milliGRAM(s) IV Push every 4 hours  norepinephrine Infusion 0.5 MICROgram(s)/kG/Min (19.125 mL/Hr) IV Continuous <Continuous>  pantoprazole  Injectable 40 milliGRAM(s) IV Push daily    MEDICATIONS  (PRN):  dextrose Gel 1 Dose(s) Oral once PRN Blood Glucose LESS THAN 70 milliGRAM(s)/deciliter  glucagon  Injectable 1 milliGRAM(s) IntraMuscular once PRN Glucose LESS THAN 70 milligrams/deciliter      PHYSICAL EXAM:  Vital Signs Last 24 Hrs  T(C): 37 (19 Mar 2018 08:00), Max: 37.3 (18 Mar 2018 20:38)  T(F): 98.6 (19 Mar 2018 08:00), Max: 99.2 (18 Mar 2018 20:38)  HR: 85 (19 Mar 2018 12:00) (77 - 98)  BP: 94/58 (19 Mar 2018 12:00) (79/58 - 124/97)  BP(mean): 69 (19 Mar 2018 12:00) (64 - 106)  RR: 24 (19 Mar 2018 12:00) (8 - 33)  SpO2: 98% (19 Mar 2018 12:00) (82% - 100%)  General: awake, not oriented, nonverbal, unable to follow commands   Karnofsky Performance Score/Palliative Performance Status Version2:   20 %    HEENT: dry mouth, + temporal wasting, NGT  Lungs: tachycapnia  CV:   normal  GI:   incontinent, NGT  : incontinent  Musculoskeletal: bedbound, dependent on ADLs at baseline, loss of muscle mass/subcutaneous fat  Skin: DTI to the R. 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) with bone, pink tissue, necrotic tissue, slough, purulent drainage, odor, and undermining; anasarca BUE  Neuro: awake, not oriented, nonverbal, unable to follow commands, + dysphagia, + mittens  Diet: NPO - NGT     LABS:                          8.6    11.8  )-----------( 307      ( 19 Mar 2018 06:50 )             28.1     03-19    146<H>  |  111<H>  |  21<H>  ----------------------------<  119<H>  4.9   |  26  |  0.68    Ca    8.7      19 Mar 2018 06:50  Phos  2.6     03-19  Mg     2.1     03-19    TPro  5.9<L>  /  Alb  1.9<L>  /  TBili  0.3  /  DBili  x   /  AST  16  /  ALT  20  /  AlkPhos  68  03-19        RADIOLOGY & ADDITIONAL STUDIES: reviewed    ADVANCE DIRECTIVES: DNR / MOLST forms previously completed as per family's wishes: DNR / DNI / trial feeding tube / trial IV fluids / use abx. No PEG

## 2018-03-19 NOTE — DISCHARGE NOTE ADULT - HOSPITAL COURSE
HPI:  83 f from NH was sent in for hyperglycemia and failure to thrive . As per daughters at bedside she has been getting progressively less interactive in the past month and has been eating very little. Patient is non verbal and arousable . She is not following any commands. Unable to obtain any further information at this time. In the ed patient is afebrile but tachycardic and hypotensive to 90/56. Labs are significant for elevated white count of 27 with left shift, lactate of 7.3 . Blood sugars on admission are 584 with low serum bicarbonate and increased anion gap . Creatinine is 5.15 and pottasium is 5.5 . She has been making very little urine after pompa was placed in the ED (11 Mar 2018 14:55)    Hospital course:    patient was sent form nursing home for uncontrollable sugar levels, patient started on insulin drip and switched to lantus and humalog when controlled. grade IV decubitus ulcer noted on back around 10x12 cm in size.   wound care consulted and recommended for surgical debridement, which was done at bedside and culture was done which showed proteus and candida albicans - meropenum, clindamycin and fluconazole was started.  CT abdomen pelvis done which showed possible osteomyelitis. patient was treated on pressors to maintain BP.   patient is being discharge to hospice care.

## 2018-03-19 NOTE — PROGRESS NOTE ADULT - PROVIDER SPECIALTY LIST ADULT
Critical Care
Infectious Disease
Internal Medicine
Nephrology
Palliative Care
Surgery
Surgery
Internal Medicine
Critical Care

## 2018-03-19 NOTE — PROGRESS NOTE ADULT - PROBLEM SELECTOR PROBLEM 2
Hyperosmolar non-ketotic state in patient with type 2 diabetes mellitus
Late onset Alzheimer's disease without behavioral disturbance
Late onset Alzheimer's disease without behavioral disturbance
Hyperosmolar non-ketotic state in patient with type 2 diabetes mellitus
Late onset Alzheimer's disease without behavioral disturbance

## 2018-03-19 NOTE — PROGRESS NOTE ADULT - SUBJECTIVE AND OBJECTIVE BOX
Patient is seen and examined at the bed side, remains afebrile and on pressor. The Leukocytosis continues trending down, slowly.         REVIEW OF SYSTEMS: Unable to obtain since patient is nonverbal           Vital Signs Last 24 Hrs  T(C): 37.4 (19 Mar 2018 16:00), Max: 37.4 (19 Mar 2018 16:00)  T(F): 99.3 (19 Mar 2018 16:00), Max: 99.3 (19 Mar 2018 16:00)  HR: 86 (19 Mar 2018 21:30) (71 - 98)  BP: 102/74 (19 Mar 2018 21:30) (79/48 - 123/72)  BP(mean): 82 (19 Mar 2018 21:30) (56 - 93)  RR: 11 (19 Mar 2018 21:30) (8 - 32)  SpO2: 99% (19 Mar 2018 21:30) (82% - 100%)          ALLERGIES: NKDA          PHYSICAL EXAM:  GENERAL: Not in  distress  CVS: s1 and s2 present  RESP: Air entry B/L  GI: Abdomen nondistended and nontender  BACK: Stage IV  decubital ulcer bandage over it  EXT: No pedal edema   CNS: Non verbal and awake          LABS:                        8.6    11.8  )-----------( 307      ( 19 Mar 2018 06:50 )             28.1                          8.3    16.7  )-----------( 223      ( 16 Mar 2018 07:17 )             26.1                            10.1   25.8  )-----------( 354      ( 12 Mar 2018 06:44 )             34.3           03-19    146<H>  |  111<H>  |  21<H>  ----------------------------<  119<H>  4.9   |  26  |  0.68    Ca    8.7      19 Mar 2018 06:50  Phos  2.6     03-19  Mg     2.1     -19    TPro  5.9<L>  /  Alb  1.9<L>  /  TBili  0.3  /  DBili  x   /  AST  16  /  ALT  20  /  AlkPhos  68  -19      03-18    147<H>  |  115<H>  |  24<H>  ----------------------------<  102<H>  4.8   |  26  |  0.64    Ca    8.5      18 Mar 2018 06:14  Phos  2.7     -  Mg     2.2     18    TPro  5.9<L>  /  Alb  2.2<L>  /  TBili  0.3  /  DBili  x   /  AST  17  /  ALT  23  /  AlkPhos  67  03-18               Urinalysis Basic - ( 11 Mar 2018 13:40 )    Color: Yellow / Appearance: Turbid / S.010 / pH: x  Gluc: x / Ketone: Negative  / Bili: Small / Urobili: 1   Blood: x / Protein: 100 / Nitrite: Positive   Leuk Esterase: Moderate / RBC: 5-10 /HPF / WBC >50 /HPF   Sq Epi: x / Non Sq Epi: Occasional /HPF / Bacteria: TNTC /HPF      CAPILLARY BLOOD GLUCOSE      POCT Blood Glucose.: 100 mg/dL (11 Mar 2018 19:52)  POCT Blood Glucose.: 125 mg/dL (11 Mar 2018 18:56)  POCT Blood Glucose.: 167 mg/dL (11 Mar 2018 18:02)  POCT Blood Glucose.: 222 mg/dL (11 Mar 2018 17:14)  POCT Blood Glucose.: 200 mg/dL (11 Mar 2018 17:12)  POCT Blood Glucose.: 422 mg/dL (11 Mar 2018 15:02)  POCT Blood Glucose.: 467 mg/dL (11 Mar 2018 13:41)  POCT Blood Glucose.: 458 mg/dL (11 Mar 2018 11:42)    ABG - ( 11 Mar 2018 12:52 )  pH: 7.28  /  pCO2: 32    /  pO2: 49    / HCO3: 14    / Base Excess: -11.2 /  SaO2: 78              MEDICATIONS  (STANDING):  chlorhexidine 2% Cloths 1 Application(s) Topical daily  clindamycin IVPB 900 milliGRAM(s) IV Intermittent every 8 hours  clindamycin IVPB      dextrose 50% Injectable 12.5 Gram(s) IV Push once  dextrose 50% Injectable 25 Gram(s) IV Push once  dextrose 50% Injectable 25 Gram(s) IV Push once  fluconAZOLE IVPB      fluconAZOLE IVPB 200 milliGRAM(s) IV Intermittent every 24 hours  heparin  Injectable 5000 Unit(s) SubCutaneous every 12 hours  insulin glargine Injectable (LANTUS) 5 Unit(s) SubCutaneous at bedtime  insulin lispro (HumaLOG) corrective regimen sliding scale   SubCutaneous every 6 hours  meropenem  IVPB 500 milliGRAM(s) IV Intermittent every 12 hours  midodrine 10 milliGRAM(s) Oral every 8 hours  morphine  - Injectable 2 milliGRAM(s) IV Push every 4 hours  norepinephrine Infusion 0.5 MICROgram(s)/kG/Min (19.125 mL/Hr) IV Continuous <Continuous>  pantoprazole  Injectable 40 milliGRAM(s) IV Push daily    MEDICATIONS  (PRN):  dextrose Gel 1 Dose(s) Oral once PRN Blood Glucose LESS THAN 70 milliGRAM(s)/deciliter  glucagon  Injectable 1 milliGRAM(s) IntraMuscular once PRN Glucose LESS THAN 70 milligrams/deciliter          RADIOLOGY & ADDITIONAL TESTS:    3/13/18 : Xray Chest 1 View- PORTABLE-Urgent (18 @ 13:17) Film rotated to the right. Lung visualization is difficult. Right jugular line remains. Heart size cannot be assessed.  There is no significant infiltrate at the left base new since . Right base difficult to assess. An NG tube has also been inserted.        3/11/18 CT Abdomen and Pelvis No Cont (18 @ 15:51) Sacral decubitus ulcer with suspected osteomyelitis and possible intramuscular left gluteal abscesses.          MICROBIOLOGY DATA:    Culture - Other (18 @ 00:19)    -  Amikacin: S <=8    -  Ampicillin: S <=2    -  Ampicillin/Sulbactam: S <=4/2    -  Aztreonam: S <=4    -  Cefazolin: S <=2    -  Cefepime: S <=2    -  Cefoxitin: S <=4    -  Ceftazidime: S <=1    -  Ceftriaxone: S <=1    -  Ciprofloxacin: R >2    -  Ertapenem: S <=0.5    -  Gentamicin: S <=1    -  Levofloxacin: R >4    -  Meropenem: S <=1    -  Piperacillin/Tazobactam: S <=8    -  Tobramycin: S <=2    -  Trimethoprim/Sulfamethoxazole: S <=0.5/9.5    Specimen Source: Skin decubitus wound    Culture Results:   Few Proteus mirabilis  Numerous Candida albicans    Organism Identification: Proteus mirabilis    Organism: Proteus mirabilis    Method Type: RONNA        Culture - Blood (03.15.18 @ 11:15)    Specimen Source: .Blood Blood-Peripheral    Culture Results:   No growth to date.    Culture - Blood (03.15.18 @ 11:14)    Specimen Source: .Blood Blood-Peripheral    Culture Results:   No growth to date.        Culture - Blood (18 @ 21:58)    Gram Stain:   Growth in anaerobic bottle: Gram Negative Rods    Specimen Source: .Blood Blood-Peripheral    Culture Results:   Growth in anaerobic bottle: Clostridium ramosum  "Susceptibilities not performed"        Culture - Urine (18 @ 21:38)    -  Amikacin: S <=8    -  Ampicillin: S <=2    -  Ampicillin/Sulbactam: S <=4/2    -  Aztreonam: S <=4    -  Cefazolin: S <=2    -  Cefepime: S <=2    -  Cefoxitin: S <=4    -  Ceftazidime: S <=1    -  Ceftriaxone: S <=1    -  Ciprofloxacin: R >2    -  Ertapenem: S <=0.5    -  Gentamicin: S 2    -  Levofloxacin: R >4    -  Meropenem: S <=1    -  Nitrofurantoin: R 64    -  Piperacillin/Tazobactam: S <=8    -  Tobramycin: S <=2    -  Trimethoprim/Sulfamethoxazole: R >2/38    Specimen Source: .Urine Catheterized    Culture Results:   >100,000 CFU/ml Proteus mirabilis    Organism Identification: Proteus mirabilis    Organism: Proteus mirabilis    Method Type: RONNA          Culture - Blood (18 @ 21:58)    -  Multidrug (KPC pos) resistant organism: Nondet    -  Staphylococcus aureus: Nondet    -  Methicillin resistant Staphylococcus aureus (MRSA): Nondet    -  Coagulase negative Staphylococcus: Nondet    -  Enterococcus species: Nondet    -  Vancomycin resistant Enterococcus sp.: Nondet    -  Escherichia coli: Nondet    -  Klebsiella oxytoca: Nondet    -  Klebsiella pneumoniae: Nondet    -  Serratia marcescens: Nondet    -  Haemophilus influenzae: Nondet    -  Listeria monocytogenes: Nondet    -  Neisseria meningitidis: Nondet    -  Pseudomonas aeruginosa: Nondet    -  Acinetobacter baumanii: Nondet    -  Enterobacter cloacae complex: Nondet    -  Streptococcus sp. (Not Grp A, B or S pneumoniae): Nondet    -  Streptococcus agalactiae (Group B): Nondet    -  Streptococcus pyogenes (Group A): Nondet    -  Streptococcus pneumoniae: Nondet    -  Candida albicans: Nondet    -  Candida glabrata: Nondet    -  Candida krusei: Nondet  Culture - Blood (18 @ 21:58)    Gram Stain:   Growth in anaerobic bottle: Gram Negative Rods    Specimen Source: .Blood Blood-Peripheral    Culture Results:   Growth in anaerobic bottle: Gram Negative Rods      -  Candida parapsilosis: Nondet    -  Candida tropicalis: Nondet    Gram Stain:   Growth in anaerobic bottle: Gram Negative Rods    Specimen Source: .Blood Blood-Peripheral    Organism: Blood Culture PCR    Culture Results:   Growth in anaerobic bottle: Gram Negative Rods  "Due to technical problems, Proteus sp. will Not be reported as part of  the BCID panel until further notice"  ***Blood Panel PCR results on this specimen are available  approximately 3 hours after the Gram stain result.***  Gram stain, PCR, and/or culture results may not always  correspond due to difference in methodologies.  ************************************************************  This PCR assay was performed using [employer].  The following targets are tested for: Enterococcus,  vancomycin resistant enterococci, Listeria monocytogenes,  coagulase negative staphylococci, S. aureus,  methicillin resistant S. aureus, Streptococcus agalactiae  (Group B), S. pneumoniae, S. pyogenes (Group A),  Acinetobacter baumannii, Enterobacter cloacae, E. coli,  Klebsiella oxytoca, K. pneumoniae, Proteus sp.,  Serratia marcescens, Haemophilus influenzae,  Neisseria meningitidis, Pseudomonas aeruginosa, Candida  albicans, C. glabrata, C krusei, C parapsilosis,  C. tropicalis and the KPC resistance gene.    Organism Identification: Blood Culture PCR    Method Type: PCR    Rapid Respiratory Viral Panel (18 @ 14:03)    Rapid RVP Result: NotDetec: The FilmArray RVP Rapid uses polymerase chain reaction (PCR) and melt  curve analysis to screen for adenovirus; coronavirus HKU1, NL63, 229E,  OC43; human metapneumovirus (hMPV); human enterovirus/rhinovirus  (Entero/RV); influenza A; influenza A/H1;influenza A/H3; influenza  A/H1-2009; influenza B; parainfluenza viruses 1, 2, 3, 4; respiratory  syncytial virus; Bordetella pertussis; Mycoplasma pneumoniae; and  Chlamydophila pneumoniae. Patient is seen and examined at the bed side, remains on pressor. The Leukocytosis almost trended down to normal.        REVIEW OF SYSTEMS: Unable to obtain since patient is nonverbal           Vital Signs Last 24 Hrs  T(C): 37.4 (19 Mar 2018 16:00), Max: 37.4 (19 Mar 2018 16:00)  T(F): 99.3 (19 Mar 2018 16:00), Max: 99.3 (19 Mar 2018 16:00)  HR: 86 (19 Mar 2018 21:30) (71 - 98)  BP: 102/74 (19 Mar 2018 21:30) (79/48 - 123/72)  BP(mean): 82 (19 Mar 2018 21:30) (56 - 93)  RR: 11 (19 Mar 2018 21:30) (8 - 32)  SpO2: 99% (19 Mar 2018 21:30) (82% - 100%)          ALLERGIES: NKDA          PHYSICAL EXAM:  GENERAL: Not in  distress  CVS: s1 and s2 present  RESP: Air entry B/L  GI: Abdomen nondistended and nontender  BACK: Stage IV  decubital ulcer bandage over it  EXT: No pedal edema   CNS: Non verbal and awake          LABS:                        8.6    11.8  )-----------( 307      ( 19 Mar 2018 06:50 )             28.1                          8.3    16.7  )-----------( 223      ( 16 Mar 2018 07:17 )             26.1                            10.1   25.8  )-----------( 354      ( 12 Mar 2018 06:44 )             34.3           03-19    146<H>  |  111<H>  |  21<H>  ----------------------------<  119<H>  4.9   |  26  |  0.68    Ca    8.7      19 Mar 2018 06:50  Phos  2.6     -19  Mg     2.1     -19    TPro  5.9<L>  /  Alb  1.9<L>  /  TBili  0.3  /  DBili  x   /  AST  16  /  ALT  20  /  AlkPhos  68  03-19      03-18    147<H>  |  115<H>  |  24<H>  ----------------------------<  102<H>  4.8   |  26  |  0.64    Ca    8.5      18 Mar 2018 06:14  Phos  2.7     -18  Mg     2.2     -18    TPro  5.9<L>  /  Alb  2.2<L>  /  TBili  0.3  /  DBili  x   /  AST  17  /  ALT  23  /  AlkPhos  67  03-18               Urinalysis Basic - ( 11 Mar 2018 13:40 )    Color: Yellow / Appearance: Turbid / S.010 / pH: x  Gluc: x / Ketone: Negative  / Bili: Small / Urobili: 1   Blood: x / Protein: 100 / Nitrite: Positive   Leuk Esterase: Moderate / RBC: 5-10 /HPF / WBC >50 /HPF   Sq Epi: x / Non Sq Epi: Occasional /HPF / Bacteria: TNTC /HPF      CAPILLARY BLOOD GLUCOSE      POCT Blood Glucose.: 100 mg/dL (11 Mar 2018 19:52)  POCT Blood Glucose.: 125 mg/dL (11 Mar 2018 18:56)  POCT Blood Glucose.: 167 mg/dL (11 Mar 2018 18:02)  POCT Blood Glucose.: 222 mg/dL (11 Mar 2018 17:14)  POCT Blood Glucose.: 200 mg/dL (11 Mar 2018 17:12)  POCT Blood Glucose.: 422 mg/dL (11 Mar 2018 15:02)  POCT Blood Glucose.: 467 mg/dL (11 Mar 2018 13:41)  POCT Blood Glucose.: 458 mg/dL (11 Mar 2018 11:42)    ABG - ( 11 Mar 2018 12:52 )  pH: 7.28  /  pCO2: 32    /  pO2: 49    / HCO3: 14    / Base Excess: -11.2 /  SaO2: 78              MEDICATIONS  (STANDING):  chlorhexidine 2% Cloths 1 Application(s) Topical daily  clindamycin IVPB 900 milliGRAM(s) IV Intermittent every 8 hours  clindamycin IVPB      dextrose 50% Injectable 12.5 Gram(s) IV Push once  dextrose 50% Injectable 25 Gram(s) IV Push once  dextrose 50% Injectable 25 Gram(s) IV Push once  fluconAZOLE IVPB      fluconAZOLE IVPB 200 milliGRAM(s) IV Intermittent every 24 hours  heparin  Injectable 5000 Unit(s) SubCutaneous every 12 hours  insulin glargine Injectable (LANTUS) 5 Unit(s) SubCutaneous at bedtime  insulin lispro (HumaLOG) corrective regimen sliding scale   SubCutaneous every 6 hours  meropenem  IVPB 500 milliGRAM(s) IV Intermittent every 12 hours  midodrine 10 milliGRAM(s) Oral every 8 hours  morphine  - Injectable 2 milliGRAM(s) IV Push every 4 hours  norepinephrine Infusion 0.5 MICROgram(s)/kG/Min (19.125 mL/Hr) IV Continuous <Continuous>  pantoprazole  Injectable 40 milliGRAM(s) IV Push daily    MEDICATIONS  (PRN):  dextrose Gel 1 Dose(s) Oral once PRN Blood Glucose LESS THAN 70 milliGRAM(s)/deciliter  glucagon  Injectable 1 milliGRAM(s) IntraMuscular once PRN Glucose LESS THAN 70 milligrams/deciliter          RADIOLOGY & ADDITIONAL TESTS:    3/13/18 : Xray Chest 1 View- PORTABLE-Urgent (18 @ 13:17) Film rotated to the right. Lung visualization is difficult. Right jugular line remains. Heart size cannot be assessed.  There is no significant infiltrate at the left base new since . Right base difficult to assess. An NG tube has also been inserted.        3/11/18 CT Abdomen and Pelvis No Cont (18 @ 15:51) Sacral decubitus ulcer with suspected osteomyelitis and possible intramuscular left gluteal abscesses.          MICROBIOLOGY DATA:    Culture - Other (18 @ 00:19)    -  Amikacin: S <=8    -  Ampicillin: S <=2    -  Ampicillin/Sulbactam: S <=4/2    -  Aztreonam: S <=4    -  Cefazolin: S <=2    -  Cefepime: S <=2    -  Cefoxitin: S <=4    -  Ceftazidime: S <=1    -  Ceftriaxone: S <=1    -  Ciprofloxacin: R >2    -  Ertapenem: S <=0.5    -  Gentamicin: S <=1    -  Levofloxacin: R >4    -  Meropenem: S <=1    -  Piperacillin/Tazobactam: S <=8    -  Tobramycin: S <=2    -  Trimethoprim/Sulfamethoxazole: S <=0.5/9.5    Specimen Source: Skin decubitus wound    Culture Results:   Few Proteus mirabilis  Numerous Candida albicans    Organism Identification: Proteus mirabilis    Organism: Proteus mirabilis    Method Type: RONNA        Culture - Blood (03.15.18 @ 11:15)    Specimen Source: .Blood Blood-Peripheral    Culture Results:   No growth to date.    Culture - Blood (03.15.18 @ 11:14)    Specimen Source: .Blood Blood-Peripheral    Culture Results:   No growth to date.        Culture - Blood (18 @ 21:58)    Gram Stain:   Growth in anaerobic bottle: Gram Negative Rods    Specimen Source: .Blood Blood-Peripheral    Culture Results:   Growth in anaerobic bottle: Clostridium ramosum  "Susceptibilities not performed"        Culture - Urine (18 @ 21:38)    -  Amikacin: S <=8    -  Ampicillin: S <=2    -  Ampicillin/Sulbactam: S <=4/2    -  Aztreonam: S <=4    -  Cefazolin: S <=2    -  Cefepime: S <=2    -  Cefoxitin: S <=4    -  Ceftazidime: S <=1    -  Ceftriaxone: S <=1    -  Ciprofloxacin: R >2    -  Ertapenem: S <=0.5    -  Gentamicin: S 2    -  Levofloxacin: R >4    -  Meropenem: S <=1    -  Nitrofurantoin: R 64    -  Piperacillin/Tazobactam: S <=8    -  Tobramycin: S <=2    -  Trimethoprim/Sulfamethoxazole: R >2/38    Specimen Source: .Urine Catheterized    Culture Results:   >100,000 CFU/ml Proteus mirabilis    Organism Identification: Proteus mirabilis    Organism: Proteus mirabilis    Method Type: [facility]          Culture - Blood (18 @ 21:58)    -  Multidrug (KPC pos) resistant organism: Nondet    -  Staphylococcus aureus: Nondet    -  Methicillin resistant Staphylococcus aureus (MRSA): Nondet    -  Coagulase negative Staphylococcus: Nondet    -  Enterococcus species: Nondet    -  Vancomycin resistant Enterococcus sp.: Nondet    -  Escherichia coli: Nondet    -  Klebsiella oxytoca: Nondet    -  Klebsiella pneumoniae: Nondet    -  Serratia marcescens: Nondet    -  Haemophilus influenzae: Nondet    -  Listeria monocytogenes: Nondet    -  Neisseria meningitidis: Nondet    -  Pseudomonas aeruginosa: Nondet    -  Acinetobacter baumanii: Nondet    -  Enterobacter cloacae complex: Nondet    -  Streptococcus sp. (Not Grp A, B or S pneumoniae): Nondet    -  Streptococcus agalactiae (Group B): Nondet    -  Streptococcus pyogenes (Group A): Nondet    -  Streptococcus pneumoniae: Nondet    -  Candida albicans: Nondet    -  Candida glabrata: Nondet    -  Candida krusei: Nondet  Culture - Blood (18 @ 21:58)    Gram Stain:   Growth in anaerobic bottle: Gram Negative Rods    Specimen Source: .Blood Blood-Peripheral    Culture Results:   Growth in anaerobic bottle: Gram Negative Rods      -  Candida parapsilosis: Nondet    -  Candida tropicalis: Nondet    Gram Stain:   Growth in anaerobic bottle: Gram Negative Rods    Specimen Source: .Blood Blood-Peripheral    Organism: Blood Culture PCR    Culture Results:   Growth in anaerobic bottle: Gram Negative Rods  "Due to technical problems, Proteus sp. will Not be reported as part of  the BCID panel until further notice"  ***Blood Panel PCR results on this specimen are available  approximately 3 hours after the Gram stain result.***  Gram stain, PCR, and/or culture results may not always  correspond due to difference in methodologies.  ************************************************************  This PCR assay was performed using [email or web address].  The following targets are tested for: Enterococcus,  vancomycin resistant enterococci, Listeria monocytogenes,  coagulase negative staphylococci, S. aureus,  methicillin resistant S. aureus, Streptococcus agalactiae  (Group B), S. pneumoniae, S. pyogenes (Group A),  Acinetobacter baumannii, Enterobacter cloacae, E. coli,  Klebsiella oxytoca, K. pneumoniae, Proteus sp.,  Serratia marcescens, Haemophilus influenzae,  Neisseria meningitidis, Pseudomonas aeruginosa, Candida  albicans, C. glabrata, C krusei, C parapsilosis,  C. tropicalis and the KPC resistance gene.    Organism Identification: Blood Culture PCR    Method Type: PCR    Rapid Respiratory Viral Panel (18 @ 14:03)    Rapid RVP Result: NotDetec: The FilmArray RVP Rapid uses polymerase chain reaction (PCR) and melt  curve analysis to screen for adenovirus; coronavirus HKU1, NL63, 229E,  OC43; human metapneumovirus (hMPV); human enterovirus/rhinovirus  (Entero/RV); influenza A; influenza A/H1;influenza A/H3; influenza  A/H1-2009; influenza B; parainfluenza viruses 1, 2, 3, 4; respiratory  syncytial virus; Bordetella pertussis; Mycoplasma pneumoniae; and  Chlamydophila pneumoniae.

## 2018-03-19 NOTE — PROGRESS NOTE ADULT - PROBLEM SELECTOR PLAN 1
Secondary to UTI and infected sacral ulcer   • continue with  meropenem [renally adjusted, allergic to pencillin]   - on vanco for clostridium ramosum  -clindamycin added as per ID recommendation for unknown organism in   - possible gluteal abcess noted on CT abdomen pelvis  blood culture gram negative rods in anaerobic bottle- clostridium ramosum and urine culture shows proteus - follow final results  -s/p bedside debridement   follow up wound cultures Secondary to UTI and infected sacral ulcer   • continue with  meropenem [renally adjusted, allergic to pencillin]   - on vanco for clostridium ramosum  -clindamycin added as per ID recommendation for unknown organism in   - possible gluteal abcess noted on CT abdomen pelvis  blood culture gram negative rods in anaerobic bottle- clostridium ramosum and urine culture shows proteus - follow final results  -s/p bedside debridement   candidia albicans on wound culture - fluconazole added  patient is going for hospice

## 2018-03-19 NOTE — PROGRESS NOTE ADULT - PROBLEM SELECTOR PLAN 1
2/2 UTI/sacral ulcer. Pt continues low dose pressor and IV abx; on ATC morphine for pain management. s/p bedside debridement last Friday. Daughter, Ruba, at bedside -agreeable to IPU referral and d/c pressor once admitted to hospice. Wishes to continue IV abx at this time. Goal of care is comfort. DNR / DNI / no escalation of care on file.

## 2018-03-19 NOTE — DISCHARGE NOTE ADULT - PATIENT PORTAL LINK FT
You can access the [facility] Patient Portal, offered by [facility], by registering with the following website: [email or web address]

## 2018-03-19 NOTE — PROGRESS NOTE ADULT - PROBLEM SELECTOR PROBLEM 3
Acute renal failure
Severe protein-calorie malnutrition
Severe protein-calorie malnutrition
Acute renal failure
Severe protein-calorie malnutrition

## 2018-03-19 NOTE — PROGRESS NOTE ADULT - PROBLEM SELECTOR PROBLEM 5
Failure to thrive syndrome, adult
Palliative care encounter
Palliative care encounter
Failure to thrive syndrome, adult
Hyperkalemia
Palliative care encounter

## 2018-03-19 NOTE — PROGRESS NOTE ADULT - SUBJECTIVE AND OBJECTIVE BOX
Anh Muñoz CHIEF COMPLAINT:Patient is a 83y old  Female who presents with a chief complaint of acute renal failure with hyperkalemia, hyperosmolar hyperglycemic state secondary to diabetes and sepsis (19 Mar 2018 18:12)    	  REVIEW OF SYSTEMS:  CONSTITUTIONAL: No fever, weight loss, or fatigue  EYES: No eye pain, visual disturbances, or discharge  ENMT:  No difficulty hearing, tinnitus, vertigo; No sinus or throat pain  NECK: No pain or stiffness  RESPIRATORY: No cough, wheezing, chills or hemoptysis; No Shortness of Breath  CARDIOVASCULAR: No chest pain, palpitations, passing out, dizziness, or leg swelling  GASTROINTESTINAL: No abdominal or epigastric pain. No nausea, vomiting, or hematemesis; No diarrhea or constipation. No melena or hematochezia.  GENITOURINARY: No dysuria, frequency, hematuria, or incontinence  NEUROLOGICAL: No headaches, memory loss, loss of strength, numbness, or tremors  SKIN: No itching, burning, rashes, or lesions   LYMPH Nodes: No enlarged glands  ENDOCRINE: No heat or cold intolerance; No hair loss  MUSCULOSKELETAL: No joint pain or swelling; No muscle, back, or extremity pain  PSYCHIATRIC: No depression, anxiety, mood swings, or difficulty sleeping  HEME/LYMPH: No easy bruising, or bleeding gums  ALLERY AND IMMUNOLOGIC: No hives or eczema	    [ ] All others negative	  [ ] Unable to obtain    PHYSICAL EXAM:  T(C): 37.4 (03-19-18 @ 16:00), Max: 37.4 (03-19-18 @ 16:00)  HR: 86 (03-19-18 @ 21:30) (71 - 98)  BP: 102/74 (03-19-18 @ 21:30) (79/48 - 123/72)  RR: 11 (03-19-18 @ 21:30) (8 - 32)  SpO2: 99% (03-19-18 @ 21:30) (82% - 100%)  Wt(kg): --  I&O's Summary    18 Mar 2018 07:01  -  19 Mar 2018 07:00  --------------------------------------------------------  IN: 2006.8 mL / OUT: 1980 mL / NET: 26.8 mL    19 Mar 2018 07:01  -  19 Mar 2018 22:48  --------------------------------------------------------  IN: 1144.2 mL / OUT: 943 mL / NET: 201.2 mL        Appearance: Normal	  HEENT:   unable to participate in exam	  Lymphatic: No lymphadenopathy  Cardiovascular: Normal S1 S2, No JVD, No murmurs, No edema  Respiratory: Lungs with bilateral rhonchi  Psychiatry: A & O x 0 . baseline is nonresponsive  Gastrointestinal:  Soft, Non-tender, + BS	  Skin: No rashes, No ecchymoses, No cyanosis	  Neurologic: Non-focal  Extremities: bed bound  Vascular: Peripheral pulses palpable 2+ bilaterally    MEDICATIONS  (STANDING):  chlorhexidine 2% Cloths 1 Application(s) Topical daily  clindamycin IVPB 900 milliGRAM(s) IV Intermittent every 8 hours  clindamycin IVPB      dextrose 50% Injectable 12.5 Gram(s) IV Push once  dextrose 50% Injectable 25 Gram(s) IV Push once  dextrose 50% Injectable 25 Gram(s) IV Push once  fluconAZOLE IVPB      fluconAZOLE IVPB 200 milliGRAM(s) IV Intermittent every 24 hours  heparin  Injectable 5000 Unit(s) SubCutaneous every 12 hours  insulin glargine Injectable (LANTUS) 5 Unit(s) SubCutaneous at bedtime  insulin lispro (HumaLOG) corrective regimen sliding scale   SubCutaneous every 6 hours  meropenem  IVPB 500 milliGRAM(s) IV Intermittent every 12 hours  midodrine 10 milliGRAM(s) Oral every 8 hours  morphine  - Injectable 2 milliGRAM(s) IV Push every 4 hours  norepinephrine Infusion 0.5 MICROgram(s)/kG/Min (19.125 mL/Hr) IV Continuous <Continuous>  pantoprazole  Injectable 40 milliGRAM(s) IV Push daily      TELEMETRY: 	    ECG:  	  RADIOLOGY:  OTHER: 	  	  CBC Full  -  ( 19 Mar 2018 06:50 )  WBC Count : 11.8 K/uL  Hemoglobin : 8.6 g/dL  Hematocrit : 28.1 %  Platelet Count - Automated : 307 K/uL  Mean Cell Volume : 82.2 fl  Mean Cell Hemoglobin : 25.0 pg  Mean Cell Hemoglobin Concentration : 30.5 gm/dL  Auto Neutrophil # : 9.6 K/uL  Auto Lymphocyte # : 1.4 K/uL  Auto Monocyte # : 0.5 K/uL  Auto Eosinophil # : 0.2 K/uL  Auto Basophil # : 0.1 K/uL  Auto Neutrophil % : 80.8 %  Auto Lymphocyte % : 12.1 %  Auto Monocyte % : 4.4 %  Auto Eosinophil % : 2.0 %  Auto Basophil % : 0.7 %        CARDIAC MARKERS:                              8.6    11.8  )-----------( 307      ( 19 Mar 2018 06:50 )             28.1       03-19    146<H>  |  111<H>  |  21<H>  ----------------------------<  119<H>  4.9   |  26  |  0.68    Ca    8.7      19 Mar 2018 06:50  Phos  2.6     03-19  Mg     2.1     03-19    TPro  5.9<L>  /  Alb  1.9<L>  /  TBili  0.3  /  DBili  x   /  AST  16  /  ALT  20  /  AlkPhos  68  03-19            proBNP:   Lipid Profile:   HgA1c: Hemoglobin A1C, Whole Blood: 6.8 % (03-13 @ 09:35)    TSH: Thyroid Stimulating Hormone, Serum: 1.82 uU/mL (03-13 @ 07:03)

## 2018-03-19 NOTE — PROGRESS NOTE ADULT - PROBLEM SELECTOR PLAN 5
Spoke with pt's daughter, Ruba, at bedside - discussed status. She acknowledges pt's poor prognosis. Wishes to continue IV abx at this time; agreeable with IPU referral and d/c pressor once admitted to hospice. Goal of care is comfort. All questions answered; supportive counseling provided.

## 2018-03-19 NOTE — PROGRESS NOTE ADULT - ASSESSMENT
Patient is a 83y old  Female with multiple co-morbidities including  Dementia, bedbound  and DM2, has sent in to the ER from Nursing Home for evaluation of acute renal failure with hyperkalemia, hyperosmolar hyperglycemic state secondary to diabetes and sepsis (11 Mar 2018 14:55). As per daughters at bedside she has been getting progressively less interactive in the past month and has been eating very little.  In the ER, she found to be afebrile but tachycardic and hypotensive to 90/56. The labs significant for positive Urine analysis, Leukocytosis, lactate of 7.3 and  Blood glucose of 584, elevated creatinine  of 5.15. 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 She has admitted to ICU since requiring pressor and started on broad spectrum antibiotics, cultures pending. The ID consult requested to assist with further evaluation and antibiotic  management.    # Septic shock - on pressor  # GRAM negative bacteremia- Clostridium ramosum  # Infected Decubitus ulcer with Left gluteal  Abscess and Osteomyelitis- s/p surgical debridement 3/16- culture grew Proteus and Candida  # UTI - proteus    Would recommend:  1. Continue Fluconazole and current antimicrobials   2. Monitor WBC count, continues trending down  3. Titrate down pressors as tolerated  4. Continue Wound care   5. Frequent repositioning  6. Aspiration precaution    d/w ICU team    will follow the patient with you Patient is a 83y old  Female with multiple co-morbidities including  Dementia, bedbound  and DM2, has sent in to the ER from Nursing Home for evaluation of acute renal failure with hyperkalemia, hyperosmolar hyperglycemic state secondary to diabetes and sepsis (11 Mar 2018 14:55). As per daughters at bedside she has been getting progressively less interactive in the past month and has been eating very little.  In the ER, she found to be afebrile but tachycardic and hypotensive to 90/56. The labs significant for positive Urine analysis, Leukocytosis, lactate of 7.3 and  Blood glucose of 584, elevated creatinine  of 5.15. 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 She has admitted to ICU since requiring pressor and started on broad spectrum antibiotics, cultures pending. The ID consult requested to assist with further evaluation and antibiotic  management.    # Septic shock - on pressor  # GRAM negative bacteremia- Clostridium ramosum  # Infected Decubitus ulcer with Left gluteal  Abscess and Osteomyelitis- s/p surgical debridement 3/16- culture grew Proteus and Candida  # UTI - proteus    Would recommend:  1. Continue Fluconazole, Meropenem X 6 weeks if within goal of care  2. Discontinue Clindamyicn   3. Titrate down pressors as tolerated  4. Continue Wound care   5. Frequent repositioning  6. Aspiration precaution    d/w ICU team    will follow the patient with you

## 2018-03-19 NOTE — PROGRESS NOTE ADULT - PROBLEM SELECTOR PLAN 2
FAST 7F.  Pt bedbound, nonverbal, dependent on all ADLs. + skin failure; comorbid severe PCM. Overall poor prognosis. Pt now eligible for IPU - family agreeable.

## 2018-03-19 NOTE — PROGRESS NOTE ADULT - PROBLEM SELECTOR PLAN 3
2/2 end stage dementia. Pt with poor PO intake. Pt noted with + temporal wasting, loss of muscle mass. Albumin: 1.9. +NGT. No PEG per family request.

## 2018-03-19 NOTE — PROGRESS NOTE ADULT - SUBJECTIVE AND OBJECTIVE BOX
wound assessment       pt seen at bedside with nurse, s/p bedside excisional decubital ulcer stage 4 debridement on 3/16       PE  dressings taken down and wound assessed. fibrinous tissue noted with no necrotic skin edges. pink granulating tissue noted in patches. no active drainage. wound re-dressed with wet to dry dressings       A/P pt is s/p bedside excisional debridement post procedure day 3    - wound to be dressed with collagenase daily by nursing staff   -pressure offloading of wound   -optimize nutrition   -d/w  and plan is as above

## 2018-03-19 NOTE — PROGRESS NOTE ADULT - PROBLEM SELECTOR PROBLEM 7
Goals of care, counseling/discussion
Need for prophylactic measure

## 2018-03-20 ENCOUNTER — INPATIENT (INPATIENT)
Facility: HOSPITAL | Age: 83
LOS: 20 days | Discharge: HOSPICE | DRG: 884 | End: 2018-04-10
Attending: INTERNAL MEDICINE | Admitting: INTERNAL MEDICINE
Payer: OTHER MISCELLANEOUS

## 2018-03-20 VITALS
SYSTOLIC BLOOD PRESSURE: 106 MMHG | WEIGHT: 106.48 LBS | DIASTOLIC BLOOD PRESSURE: 45 MMHG | RESPIRATION RATE: 17 BRPM | OXYGEN SATURATION: 100 % | TEMPERATURE: 98 F | HEART RATE: 69 BPM

## 2018-03-20 VITALS — DIASTOLIC BLOOD PRESSURE: 62 MMHG | HEART RATE: 69 BPM | RESPIRATION RATE: 15 BRPM | SYSTOLIC BLOOD PRESSURE: 102 MMHG

## 2018-03-20 DIAGNOSIS — G30.9 ALZHEIMER'S DISEASE, UNSPECIFIED: ICD-10-CM

## 2018-03-20 PROCEDURE — 87798 DETECT AGENT NOS DNA AMP: CPT

## 2018-03-20 PROCEDURE — 92610 EVALUATE SWALLOWING FUNCTION: CPT

## 2018-03-20 PROCEDURE — 85610 PROTHROMBIN TIME: CPT

## 2018-03-20 PROCEDURE — 86140 C-REACTIVE PROTEIN: CPT

## 2018-03-20 PROCEDURE — 87070 CULTURE OTHR SPECIMN AEROBIC: CPT

## 2018-03-20 PROCEDURE — 83735 ASSAY OF MAGNESIUM: CPT

## 2018-03-20 PROCEDURE — 82962 GLUCOSE BLOOD TEST: CPT

## 2018-03-20 PROCEDURE — 87186 SC STD MICRODIL/AGAR DIL: CPT

## 2018-03-20 PROCEDURE — 82550 ASSAY OF CK (CPK): CPT

## 2018-03-20 PROCEDURE — 80053 COMPREHEN METABOLIC PANEL: CPT

## 2018-03-20 PROCEDURE — 87581 M.PNEUMON DNA AMP PROBE: CPT

## 2018-03-20 PROCEDURE — 87086 URINE CULTURE/COLONY COUNT: CPT

## 2018-03-20 PROCEDURE — 87040 BLOOD CULTURE FOR BACTERIA: CPT

## 2018-03-20 PROCEDURE — 80202 ASSAY OF VANCOMYCIN: CPT

## 2018-03-20 PROCEDURE — P9047: CPT

## 2018-03-20 PROCEDURE — 94760 N-INVAS EAR/PLS OXIMETRY 1: CPT

## 2018-03-20 PROCEDURE — 93005 ELECTROCARDIOGRAM TRACING: CPT

## 2018-03-20 PROCEDURE — 99291 CRITICAL CARE FIRST HOUR: CPT | Mod: 25

## 2018-03-20 PROCEDURE — 84100 ASSAY OF PHOSPHORUS: CPT

## 2018-03-20 PROCEDURE — 85027 COMPLETE CBC AUTOMATED: CPT

## 2018-03-20 PROCEDURE — 87150 DNA/RNA AMPLIFIED PROBE: CPT

## 2018-03-20 PROCEDURE — 96375 TX/PRO/DX INJ NEW DRUG ADDON: CPT

## 2018-03-20 PROCEDURE — 81001 URINALYSIS AUTO W/SCOPE: CPT

## 2018-03-20 PROCEDURE — 71045 X-RAY EXAM CHEST 1 VIEW: CPT

## 2018-03-20 PROCEDURE — 80048 BASIC METABOLIC PNL TOTAL CA: CPT

## 2018-03-20 PROCEDURE — 82009 KETONE BODYS QUAL: CPT

## 2018-03-20 PROCEDURE — 82803 BLOOD GASES ANY COMBINATION: CPT

## 2018-03-20 PROCEDURE — 87486 CHLMYD PNEUM DNA AMP PROBE: CPT

## 2018-03-20 PROCEDURE — 85652 RBC SED RATE AUTOMATED: CPT

## 2018-03-20 PROCEDURE — 87633 RESP VIRUS 12-25 TARGETS: CPT

## 2018-03-20 PROCEDURE — 96374 THER/PROPH/DIAG INJ IV PUSH: CPT

## 2018-03-20 PROCEDURE — 84443 ASSAY THYROID STIM HORMONE: CPT

## 2018-03-20 PROCEDURE — 74176 CT ABD & PELVIS W/O CONTRAST: CPT

## 2018-03-20 PROCEDURE — 83036 HEMOGLOBIN GLYCOSYLATED A1C: CPT

## 2018-03-20 PROCEDURE — 83605 ASSAY OF LACTIC ACID: CPT

## 2018-03-20 RX ORDER — ACETAMINOPHEN 500 MG
650 TABLET ORAL
Qty: 0 | Refills: 0 | Status: DISCONTINUED | OUTPATIENT
Start: 2018-03-20 | End: 2018-04-10

## 2018-03-20 RX ORDER — FLUCONAZOLE 150 MG/1
200 TABLET ORAL EVERY 24 HOURS
Qty: 0 | Refills: 0 | Status: DISCONTINUED | OUTPATIENT
Start: 2018-03-20 | End: 2018-04-02

## 2018-03-20 RX ORDER — NOREPINEPHRINE BITARTRATE/D5W 8 MG/250ML
0.05 PLASTIC BAG, INJECTION (ML) INTRAVENOUS
Qty: 16 | Refills: 0 | Status: DISCONTINUED | OUTPATIENT
Start: 2018-03-20 | End: 2018-03-20

## 2018-03-20 RX ORDER — ROBINUL 0.2 MG/ML
0.2 INJECTION INTRAMUSCULAR; INTRAVENOUS EVERY 6 HOURS
Qty: 0 | Refills: 0 | Status: DISCONTINUED | OUTPATIENT
Start: 2018-03-20 | End: 2018-04-10

## 2018-03-20 RX ORDER — HYDROMORPHONE HYDROCHLORIDE 2 MG/ML
0.5 INJECTION INTRAMUSCULAR; INTRAVENOUS; SUBCUTANEOUS
Qty: 0 | Refills: 0 | Status: DISCONTINUED | OUTPATIENT
Start: 2018-03-20 | End: 2018-03-27

## 2018-03-20 RX ADMIN — HYDROMORPHONE HYDROCHLORIDE 0.5 MILLIGRAM(S): 2 INJECTION INTRAMUSCULAR; INTRAVENOUS; SUBCUTANEOUS at 16:59

## 2018-03-20 RX ADMIN — Medication 100 MILLIGRAM(S): at 21:15

## 2018-03-20 RX ADMIN — Medication 2 MICROGRAM(S)/KG/MIN: at 01:00

## 2018-03-20 RX ADMIN — HYDROMORPHONE HYDROCHLORIDE 0.5 MILLIGRAM(S): 2 INJECTION INTRAMUSCULAR; INTRAVENOUS; SUBCUTANEOUS at 17:15

## 2018-03-20 NOTE — H&P ADULT - HISTORY OF PRESENT ILLNESS
83 year old female with hospice diagnosis of advanced dementia with poor appetite and weight loss recently admitted from the NH to [facility] with sepsis.  The patient was placed on IV pressors and IV antibiotics.  Her condition improved somewhat but overall she did not return to baseline.  The patient was noted to have increasing pain.  The surrogate elected for hospice and the patient was admitted to the IPU at [facility] for IV management of pain.

## 2018-03-20 NOTE — H&P ADULT - ASSESSMENT
83 year old female with hospice diagnosis of advanced dementia admitted to the IPU at [facility] for IV management of pain.

## 2018-03-20 NOTE — H&P ADULT - NSHPPHYSICALEXAM_GEN_ALL_CORE
cachectic female in no respiratory distress  126/77 68 98.5 16 93%  HEENT: severe bitemporal wasting  Neck: no JVD no nodes no thyromegaly  Lungs: decreased breath sounds and rales at bases  Heart: s1s2 no audible murmurs  Abd: soft ND NT no masses no organomegaly  Ext: no edema, no redness

## 2018-03-20 NOTE — PROGRESS NOTE ADULT - SUBJECTIVE AND OBJECTIVE BOX
CHIEF COMPLAINT:Patient is a 83y old  Female who presents with a chief complaint of   	  REVIEW OF SYSTEMS:  CONSTITUTIONAL: No fever, weight loss, or fatigue  EYES: No eye pain, visual disturbances, or discharge  ENMT:  No difficulty hearing, tinnitus, vertigo; No sinus or throat pain  NECK: No pain or stiffness  RESPIRATORY: No cough, wheezing, chills or hemoptysis; No Shortness of Breath  CARDIOVASCULAR: No chest pain, palpitations, passing out, dizziness, or leg swelling  GASTROINTESTINAL: No abdominal or epigastric pain. No nausea, vomiting, or hematemesis; No diarrhea or constipation. No melena or hematochezia.  GENITOURINARY: No dysuria, frequency, hematuria, or incontinence  NEUROLOGICAL: No headaches, memory loss, loss of strength, numbness, or tremors  SKIN: No itching, burning, rashes, or lesions   LYMPH Nodes: No enlarged glands  ENDOCRINE: No heat or cold intolerance; No hair loss  MUSCULOSKELETAL: No joint pain or swelling; No muscle, back, or extremity pain  PSYCHIATRIC: No depression, anxiety, mood swings, or difficulty sleeping  HEME/LYMPH: No easy bruising, or bleeding gums  ALLERY AND IMMUNOLOGIC: No hives or eczema	    [ ] All others negative	  [ ] Unable to obtain    PHYSICAL EXAM:  T(C): 36.9 (03-20-18 @ 15:42), Max: 36.9 (03-20-18 @ 15:42)  HR: 68 (03-20-18 @ 15:42) (68 - 80)  BP: 126/77 (03-20-18 @ 15:42) (102/62 - 136/71)  RR: 16 (03-20-18 @ 15:42) (15 - 20)  SpO2: 93% (03-20-18 @ 15:42) (93% - 100%)  Wt(kg): --  I&O's Summary    19 Mar 2018 07:01  -  20 Mar 2018 07:00  --------------------------------------------------------  IN: 0 mL / OUT: 600 mL / NET: -600 mL    20 Mar 2018 07:01  -  20 Mar 2018 23:34  --------------------------------------------------------  IN: 0 mL / OUT: 1300 mL / NET: -1300 mL        Appearance: Normal	  HEENT:   Normal oral mucosa, PERRL, EOMI	  Lymphatic: No lymphadenopathy  Cardiovascular: Normal S1 S2, No JVD, No murmurs, No edema  Respiratory: bilateral rhonchi  Psychiatry: A & O x 0, baseline.  Gastrointestinal:  Soft, Non-tender, + BS	  Skin: No rashes, No ecchymoses, No cyanosis	  Neurologic: Non-focal  Extremities: Normal range of motion, No clubbing, cyanosis or edema  Vascular: Peripheral pulses palpable 2+ bilaterally    MEDICATIONS  (STANDING):  clindamycin IVPB 900 milliGRAM(s) IV Intermittent every 8 hours  fluconAZOLE IVPB 200 milliGRAM(s) IV Intermittent every 24 hours      TELEMETRY: 	    ECG:  	  RADIOLOGY:  OTHER: 	  	  CBC Full  -  ( 19 Mar 2018 06:50 )  WBC Count : 11.8 K/uL  Hemoglobin : 8.6 g/dL  Hematocrit : 28.1 %  Platelet Count - Automated : 307 K/uL  Mean Cell Volume : 82.2 fl  Mean Cell Hemoglobin : 25.0 pg  Mean Cell Hemoglobin Concentration : 30.5 gm/dL  Auto Neutrophil # : 9.6 K/uL  Auto Lymphocyte # : 1.4 K/uL  Auto Monocyte # : 0.5 K/uL  Auto Eosinophil # : 0.2 K/uL  Auto Basophil # : 0.1 K/uL  Auto Neutrophil % : 80.8 %  Auto Lymphocyte % : 12.1 %  Auto Monocyte % : 4.4 %  Auto Eosinophil % : 2.0 %  Auto Basophil % : 0.7 %        CARDIAC MARKERS:                              8.6    11.8  )-----------( 307      ( 19 Mar 2018 06:50 )             28.1       03-19    146<H>  |  111<H>  |  21<H>  ----------------------------<  119<H>  4.9   |  26  |  0.68    Ca    8.7      19 Mar 2018 06:50  Phos  2.6     03-19  Mg     2.1     03-19    TPro  5.9<L>  /  Alb  1.9<L>  /  TBili  0.3  /  DBili  x   /  AST  16  /  ALT  20  /  AlkPhos  68  03-19            proBNP:   Lipid Profile:   HgA1c: Hemoglobin A1C, Whole Blood: 6.8 % (03-13 @ 09:35)    TSH: Thyroid Stimulating Hormone, Serum: 1.82 uU/mL (03-13 @ 07:03)

## 2018-03-21 DIAGNOSIS — R06.00 DYSPNEA, UNSPECIFIED: ICD-10-CM

## 2018-03-21 DIAGNOSIS — K59.03 DRUG INDUCED CONSTIPATION: ICD-10-CM

## 2018-03-21 DIAGNOSIS — F01.50 VASCULAR DEMENTIA WITHOUT BEHAVIORAL DISTURBANCE: ICD-10-CM

## 2018-03-21 DIAGNOSIS — R41.0 DISORIENTATION, UNSPECIFIED: ICD-10-CM

## 2018-03-21 DIAGNOSIS — R52 PAIN, UNSPECIFIED: ICD-10-CM

## 2018-03-21 DIAGNOSIS — K11.7 DISTURBANCES OF SALIVARY SECRETION: ICD-10-CM

## 2018-03-21 DIAGNOSIS — L98.9 DISORDER OF THE SKIN AND SUBCUTANEOUS TISSUE, UNSPECIFIED: ICD-10-CM

## 2018-03-21 RX ADMIN — HYDROMORPHONE HYDROCHLORIDE 0.5 MILLIGRAM(S): 2 INJECTION INTRAMUSCULAR; INTRAVENOUS; SUBCUTANEOUS at 14:56

## 2018-03-21 RX ADMIN — Medication 0.5 MILLIGRAM(S): at 19:43

## 2018-03-21 RX ADMIN — HYDROMORPHONE HYDROCHLORIDE 0.5 MILLIGRAM(S): 2 INJECTION INTRAMUSCULAR; INTRAVENOUS; SUBCUTANEOUS at 19:33

## 2018-03-21 RX ADMIN — FLUCONAZOLE 100 MILLIGRAM(S): 150 TABLET ORAL at 05:49

## 2018-03-21 RX ADMIN — Medication 100 MILLIGRAM(S): at 14:57

## 2018-03-21 RX ADMIN — Medication 100 MILLIGRAM(S): at 05:49

## 2018-03-21 RX ADMIN — HYDROMORPHONE HYDROCHLORIDE 0.5 MILLIGRAM(S): 2 INJECTION INTRAMUSCULAR; INTRAVENOUS; SUBCUTANEOUS at 15:07

## 2018-03-21 RX ADMIN — Medication 100 MILLIGRAM(S): at 21:28

## 2018-03-21 RX ADMIN — HYDROMORPHONE HYDROCHLORIDE 0.5 MILLIGRAM(S): 2 INJECTION INTRAMUSCULAR; INTRAVENOUS; SUBCUTANEOUS at 18:13

## 2018-03-21 NOTE — PROGRESS NOTE ADULT - SUBJECTIVE AND OBJECTIVE BOX
CHIEF COMPLAINT:Patient is a 83y old  Female who presents with a chief complaint of dyspnea (20 Mar 2018 22:20)    	  REVIEW OF SYSTEMS:  CONSTITUTIONAL: No fever, weight loss, or fatigue  EYES: No eye pain, visual disturbances, or discharge  ENMT:  No difficulty hearing, tinnitus, vertigo; No sinus or throat pain  NECK: No pain or stiffness  RESPIRATORY: No cough, wheezing, chills or hemoptysis; No Shortness of Breath  CARDIOVASCULAR: No chest pain, palpitations, passing out, dizziness, or leg swelling  GASTROINTESTINAL: No abdominal or epigastric pain. No nausea, vomiting, or hematemesis; No diarrhea or constipation. No melena or hematochezia.  GENITOURINARY: No dysuria, frequency, hematuria, or incontinence  NEUROLOGICAL: No headaches, memory loss, loss of strength, numbness, or tremors  SKIN: No itching, burning, rashes, or lesions   LYMPH Nodes: No enlarged glands  ENDOCRINE: No heat or cold intolerance; No hair loss  MUSCULOSKELETAL: No joint pain or swelling; No muscle, back, or extremity pain  PSYCHIATRIC: No depression, anxiety, mood swings, or difficulty sleeping  HEME/LYMPH: No easy bruising, or bleeding gums  ALLERY AND IMMUNOLOGIC: No hives or eczema	    [ ] All others negative	  [ ] Unable to obtain    PHYSICAL EXAM:  T(C): 36.7 (03-21-18 @ 15:56), Max: 36.7 (03-21-18 @ 00:30)  HR: 109 (03-21-18 @ 15:56) (69 - 109)  BP: 114/46 (03-21-18 @ 15:56) (103/51 - 114/46)  RR: 16 (03-21-18 @ 15:56) (16 - 17)  SpO2: 95% (03-21-18 @ 15:56) (95% - 100%)  Wt(kg): --  I&O's Summary    20 Mar 2018 07:01  -  21 Mar 2018 07:00  --------------------------------------------------------  IN: 0 mL / OUT: 1800 mL / NET: -1800 mL        Appearance: Normal	  HEENT:   Normal oral mucosa, PERRL, EOMI	  Lymphatic: No lymphadenopathy  Cardiovascular: Normal S1 S2, No JVD, No murmurs, No edema  Respiratory: Lungs clear to auscultation	  Psychiatry: A & O x 0 at baseline.  Gastrointestinal:  Soft, Non-tender, + BS	  Skin: No rashes, No ecchymoses, No cyanosis	  Neurologic: Non-focal  Extremities: Normal range of motion, No clubbing, cyanosis or edema  Vascular: Peripheral pulses palpable 2+ bilaterally    MEDICATIONS  (STANDING):  clindamycin IVPB 900 milliGRAM(s) IV Intermittent every 8 hours  fluconAZOLE IVPB 200 milliGRAM(s) IV Intermittent every 24 hours      TELEMETRY: 	    ECG:  	  RADIOLOGY:  OTHER: 	  	        CARDIAC MARKERS:                          proBNP:   Lipid Profile:   HgA1c: Hemoglobin A1C, Whole Blood: 6.8 % (03-13 @ 09:35)    TSH: Thyroid Stimulating Hormone, Serum: 1.82 uU/mL (03-13 @ 07:03)

## 2018-03-21 NOTE — PROGRESS NOTE ADULT - SUBJECTIVE AND OBJECTIVE BOX
DECLAN CROCKER                    83y  Female    Allergies    penicillin (Rash)  sulfa drugs (Unknown)    Intolerances        Symptoms:  Pain (1-10): 0  Dyspnea: 0  Nausea/Vomitin  Secretions: 0  Agitation: 0  Symptom Requiring Inpatient Hospice Admission: pain and dyspnea    Overnight events/interim history:    HPI:  83 year old female with hospice diagnosis of advanced dementia with poor appetite and weight loss recently admitted from the NH to [facility] with sepsis.  The patient was placed on IV pressors and IV antibiotics.  Her condition improved somewhat but overall she did not return to baseline.  The patient was noted to have increasing pain.  The surrogate elected for hospice and the patient was admitted to the IPU at [facility] for IV management of pain. (20 Mar 2018 22:20)          PPSV2: 10%    Code Status: DNR          MEDICATIONS  (STANDING):  clindamycin IVPB 900 milliGRAM(s) IV Intermittent every 8 hours  fluconAZOLE IVPB 200 milliGRAM(s) IV Intermittent every 24 hours    MEDICATIONS  (PRN):  acetaminophen  Suppository 650 milliGRAM(s) Rectal four times a day PRN For Temp greater than 38 C (100.4 F)  glycopyrrolate Injectable 0.2 milliGRAM(s) IV Push every 6 hours PRN secretions  HYDROmorphone  Injectable 0.5 milliGRAM(s) IV Push every 1 hour PRN dyspnea and/or pain  LORazepam   Injectable 0.5 milliGRAM(s) IV Push every 4 hours PRN Agitation  sodium biphosphate Rectal Enema 1 Enema Rectal every 24 hours PRN constipation                             Vital Signs Last 24 Hrs  T(C): 36.4 (21 Mar 2018 21:33), Max: 37.7 (21 Mar 2018 20:15)  T(F): 97.6 (21 Mar 2018 21:33), Max: 99.9 (21 Mar 2018 20:15)  HR: 109 (21 Mar 2018 15:56) (69 - 109)  BP: 114/46 (21 Mar 2018 15:56) (103/51 - 114/46)  BP(mean): --  RR: 16 (21 Mar 2018 15:56) (16 - 16)  SpO2: 95% (21 Mar 2018 15:56) (95% - 100%)    PHYSICAL EXAM:    cachectic female in no respiratory distress  103/51 69 98.0 100% 16  HEENT: severe bitemporal wasting  Neck: no JVD no nodes no thyromegaly  Lungs: decreased breath sounds and rales at bases  Heart: s1s2 no audible murmurs  Abd: soft ND NT no masses no organomegaly  Ext: no edema, no redness

## 2018-03-22 PROCEDURE — 99233 SBSQ HOSP IP/OBS HIGH 50: CPT

## 2018-03-22 RX ADMIN — Medication 100 MILLIGRAM(S): at 21:39

## 2018-03-22 RX ADMIN — Medication 100 MILLIGRAM(S): at 14:45

## 2018-03-22 RX ADMIN — Medication 100 MILLIGRAM(S): at 05:31

## 2018-03-22 RX ADMIN — FLUCONAZOLE 100 MILLIGRAM(S): 150 TABLET ORAL at 05:31

## 2018-03-22 NOTE — PROGRESS NOTE ADULT - SUBJECTIVE AND OBJECTIVE BOX
CHIEF COMPLAINT:Patient is a 83y old  Female who presents with a chief complaint of dyspnea (20 Mar 2018 22:20)    	  REVIEW OF SYSTEMS:  CONSTITUTIONAL: No fever, weight loss, or fatigue  EYES: No eye pain, visual disturbances, or discharge  ENMT:  No difficulty hearing, tinnitus, vertigo; No sinus or throat pain  NECK: No pain or stiffness  RESPIRATORY: No cough, wheezing, chills or hemoptysis; No Shortness of Breath  CARDIOVASCULAR: No chest pain, palpitations, passing out, dizziness, or leg swelling  GASTROINTESTINAL: No abdominal or epigastric pain. No nausea, vomiting, or hematemesis; No diarrhea or constipation. No melena or hematochezia.  GENITOURINARY: No dysuria, frequency, hematuria, or incontinence  NEUROLOGICAL: No headaches, memory loss, loss of strength, numbness, or tremors  SKIN: No itching, burning, rashes, or lesions   LYMPH Nodes: No enlarged glands  ENDOCRINE: No heat or cold intolerance; No hair loss  MUSCULOSKELETAL: No joint pain or swelling; No muscle, back, or extremity pain  PSYCHIATRIC: No depression, anxiety, mood swings, or difficulty sleeping  HEME/LYMPH: No easy bruising, or bleeding gums  ALLERY AND IMMUNOLOGIC: No hives or eczema	    [ ] All others negative	  [ ] Unable to obtain    PHYSICAL EXAM:  T(C): 37.7 (03-22-18 @ 16:18), Max: 37.7 (03-22-18 @ 16:18)  HR: 79 (03-22-18 @ 16:18) (76 - 81)  BP: 93/45 (03-22-18 @ 16:18) (93/45 - 110/46)  RR: 16 (03-22-18 @ 16:18) (14 - 16)  SpO2: 99% (03-22-18 @ 16:18) (94% - 99%)  Wt(kg): --  I&O's Summary    21 Mar 2018 07:01  -  22 Mar 2018 07:00  --------------------------------------------------------  IN: 0 mL / OUT: 560 mL / NET: -560 mL    22 Mar 2018 07:01  -  22 Mar 2018 23:45  --------------------------------------------------------  IN: 0 mL / OUT: 1380 mL / NET: -1380 mL        Appearance: Normal	  HEENT:   Normal oral mucosa, PERRL, EOMI	  Lymphatic: No lymphadenopathy  Cardiovascular: Normal S1 S2, No JVD, No murmurs, No edema  Respiratory: Lungs with bilateral rhonchi	  Psychiatry: A & O x 0 at baseline.  Gastrointestinal:  Soft, Non-tender, + BS	  Skin: No rashes, No ecchymoses, No cyanosis	  Neurologic: Non-focal  Extremities: Normal range of motion, No clubbing, cyanosis or edema  Vascular: Peripheral pulses palpable 2+ bilaterally    MEDICATIONS  (STANDING):  clindamycin IVPB 900 milliGRAM(s) IV Intermittent every 8 hours  fluconAZOLE IVPB 200 milliGRAM(s) IV Intermittent every 24 hours      TELEMETRY: 	    ECG:  	  RADIOLOGY:  OTHER: 	  	        CARDIAC MARKERS:                          proBNP:   Lipid Profile:   HgA1c: Hemoglobin A1C, Whole Blood: 6.8 % (03-13 @ 09:35)    TSH: Thyroid Stimulating Hormone, Serum: 1.82 uU/mL (03-13 @ 07:03)

## 2018-03-22 NOTE — PROGRESS NOTE ADULT - SUBJECTIVE AND OBJECTIVE BOX
DECLAN CROCKER                    83y  Female    Allergies    penicillin (Rash)  sulfa drugs (Unknown)    Intolerances        Symptoms:  Pain (1-10):  Dyspnea:  Nausea/Vomiting:  Secretions:   Agitation:  Symptom Requiring Inpatient Hospice Admission:    Overnight events/interim history:    HPI:  83 year old female with hospice diagnosis of advanced dementia with poor appetite and weight loss recently admitted from the NH to [facility] with sepsis.  The patient was placed on IV pressors and IV antibiotics.  Her condition improved somewhat but overall she did not return to baseline.  The patient was noted to have increasing pain.  The surrogate elected for hospice and the patient was admitted to the IPU at [facility] for IV management of pain. (20 Mar 2018 22:20)          PPSV2:     Code Status:          MEDICATIONS  (STANDING):  clindamycin IVPB 900 milliGRAM(s) IV Intermittent every 8 hours  fluconAZOLE IVPB 200 milliGRAM(s) IV Intermittent every 24 hours    MEDICATIONS  (PRN):  acetaminophen  Suppository 650 milliGRAM(s) Rectal four times a day PRN For Temp greater than 38 C (100.4 F)  glycopyrrolate Injectable 0.2 milliGRAM(s) IV Push every 6 hours PRN secretions  HYDROmorphone  Injectable 0.5 milliGRAM(s) IV Push every 1 hour PRN dyspnea and/or pain  LORazepam   Injectable 0.5 milliGRAM(s) IV Push every 4 hours PRN Agitation  sodium biphosphate Rectal Enema 1 Enema Rectal every 24 hours PRN constipation                             Vital Signs Last 24 Hrs  T(C): 37.7 (22 Mar 2018 16:18), Max: 37.7 (21 Mar 2018 20:15)  T(F): 99.8 (22 Mar 2018 16:18), Max: 99.9 (21 Mar 2018 20:15)  HR: 79 (22 Mar 2018 16:18) (76 - 81)  BP: 93/45 (22 Mar 2018 16:18) (93/45 - 110/46)  BP(mean): --  RR: 16 (22 Mar 2018 16:18) (14 - 16)  SpO2: 99% (22 Mar 2018 16:18) (94% - 99%)    PHYSICAL EXAM: DECLAN CROCKER                    83y  Female    Allergies    penicillin (Rash)  sulfa drugs (Unknown)    Intolerances    Symptoms:  Pain (1-10): mild in the sacrum  Dyspnea: none at this time  Nausea/Vomiting: none at this time  Secretions: none at this time  Agitation: none at this time  Symptom Requiring Inpatient Hospice Admission: pain in the infected sacral ulcers    Overnight events/interim history:    HPI:  83 year old female with hospice diagnosis of advanced dementia with poor appetite and weight loss recently admitted from the NH to [facility] with sepsis.  The patient was placed on IV pressors and IV antibiotics.  Her condition improved somewhat but overall she did not return to baseline.  The patient was noted to have increasing pain.  The surrogate elected for hospice and the patient was admitted to the IPU at [facility] for IV management of pain. (20 Mar 2018 22:20)          PPSV2: 20-30    Code Status: DNR/DNI    MEDICATIONS  (STANDING):  clindamycin IVPB 900 milliGRAM(s) IV Intermittent every 8 hours  fluconAZOLE IVPB 200 milliGRAM(s) IV Intermittent every 24 hours    MEDICATIONS  (PRN):  acetaminophen  Suppository 650 milliGRAM(s) Rectal four times a day PRN For Temp greater than 38 C (100.4 F)  glycopyrrolate Injectable 0.2 milliGRAM(s) IV Push every 6 hours PRN secretions  HYDROmorphone  Injectable 0.5 milliGRAM(s) IV Push every 1 hour PRN dyspnea and/or pain  LORazepam   Injectable 0.5 milliGRAM(s) IV Push every 4 hours PRN Agitation  sodium biphosphate Rectal Enema 1 Enema Rectal every 24 hours PRN constipation                         Vital Signs Last 24 Hrs  T(C): 37.7 (22 Mar 2018 16:18), Max: 37.7 (21 Mar 2018 20:15)  T(F): 99.8 (22 Mar 2018 16:18), Max: 99.9 (21 Mar 2018 20:15)  HR: 79 (22 Mar 2018 16:18) (76 - 81)  BP: 93/45 (22 Mar 2018 16:18) (93/45 - 110/46)  BP(mean): --  RR: 16 (22 Mar 2018 16:18) (14 - 16)  SpO2: 99% (22 Mar 2018 16:18) (94% - 99%)    PHYSICAL EXAM: DECLAN CROCKER                    83y  Female    Allergies    penicillin (Rash)  sulfa drugs (Unknown)    Intolerances    Symptoms:  Pain (1-10): mild in the sacrum  Dyspnea: none at this time  Nausea/Vomiting: none at this time  Secretions: none at this time  Agitation: none at this time  Symptom Requiring Inpatient Hospice Admission: pain in the infected sacral ulcers    Overnight events/interim history:    HPI:  83 year old female with hospice diagnosis of advanced dementia with poor appetite and weight loss recently admitted from the NH to [facility] with sepsis.  The patient was placed on IV pressors and IV antibiotics.  Her condition improved somewhat but overall she did not return to baseline.  The patient was noted to have increasing pain.  The surrogate elected for hospice and the patient was admitted to the IPU at [facility] for IV management of pain. (20 Mar 2018 22:20)          PPSV2: 20-30    Code Status: DNR/DNI    MEDICATIONS  (STANDING):  clindamycin IVPB 900 milliGRAM(s) IV Intermittent every 8 hours  fluconAZOLE IVPB 200 milliGRAM(s) IV Intermittent every 24 hours    MEDICATIONS  (PRN):  acetaminophen  Suppository 650 milliGRAM(s) Rectal four times a day PRN For Temp greater than 38 C (100.4 F)  glycopyrrolate Injectable 0.2 milliGRAM(s) IV Push every 6 hours PRN secretions  HYDROmorphone  Injectable 0.5 milliGRAM(s) IV Push every 1 hour PRN dyspnea and/or pain  LORazepam   Injectable 0.5 milliGRAM(s) IV Push every 4 hours PRN Agitation  sodium biphosphate Rectal Enema 1 Enema Rectal every 24 hours PRN constipation                         Vital Signs Last 24 Hrs  T(C): 37.7 (22 Mar 2018 16:18), Max: 37.7 (21 Mar 2018 20:15)  T(F): 99.8 (22 Mar 2018 16:18), Max: 99.9 (21 Mar 2018 20:15)  HR: 79 (22 Mar 2018 16:18) (76 - 81)  BP: 93/45 (22 Mar 2018 16:18) (93/45 - 110/46)  BP(mean): --  RR: 16 (22 Mar 2018 16:18) (14 - 16)  SpO2: 99% (22 Mar 2018 16:18) (94% - 99%)    PHYSICAL EXAM:  alert and awake - can communicate occ words but mostly lethargic  contracted x4, severely tender sacral ulcers stage IV  no teeth  abd soft  pompa

## 2018-03-22 NOTE — PROGRESS NOTE ADULT - PROBLEM SELECTOR PLAN 3
robinul 0.2 mg IVP every 6 hours as needed robinul 0.2 mg IVP every 6 hours as needed, able to tolerate some po pleasure feeds

## 2018-03-23 RX ADMIN — Medication 100 MILLIGRAM(S): at 06:31

## 2018-03-23 RX ADMIN — Medication 100 MILLIGRAM(S): at 22:03

## 2018-03-23 RX ADMIN — Medication 100 MILLIGRAM(S): at 13:28

## 2018-03-23 RX ADMIN — Medication 650 MILLIGRAM(S): at 01:38

## 2018-03-23 RX ADMIN — FLUCONAZOLE 100 MILLIGRAM(S): 150 TABLET ORAL at 05:21

## 2018-03-23 RX ADMIN — ROBINUL 0.2 MILLIGRAM(S): 0.2 INJECTION INTRAMUSCULAR; INTRAVENOUS at 22:03

## 2018-03-23 NOTE — PROGRESS NOTE ADULT - SUBJECTIVE AND OBJECTIVE BOX
DECLAN CROCKER                    83y  Female    Allergies    penicillin (Rash)  sulfa drugs (Unknown)    Intolerances        Symptoms:  Pain (1-10): 0  Dyspnea: 0  Nausea/Vomitin  Secretions: 0  Agitation: 0  Symptom Requiring Inpatient Hospice Admission: dyspnea and pain    Overnight events/interim history: dyspnea    HPI:  83 year old female with hospice diagnosis of advanced dementia with poor appetite and weight loss recently admitted from the NH to [facility] with sepsis.  The patient was placed on IV pressors and IV antibiotics.  Her condition improved somewhat but overall she did not return to baseline.  The patient was noted to have increasing pain.  The surrogate elected for hospice and the patient was admitted to the IPU at [facility] for IV management of pain. (20 Mar 2018 22:20)          PPSV2: 10%    Code Status: DNR          MEDICATIONS  (STANDING):  clindamycin IVPB 900 milliGRAM(s) IV Intermittent every 8 hours  fluconAZOLE IVPB 200 milliGRAM(s) IV Intermittent every 24 hours    MEDICATIONS  (PRN):  acetaminophen  Suppository 650 milliGRAM(s) Rectal four times a day PRN For Temp greater than 38 C (100.4 F)  glycopyrrolate Injectable 0.2 milliGRAM(s) IV Push every 6 hours PRN secretions  HYDROmorphone  Injectable 0.5 milliGRAM(s) IV Push every 1 hour PRN dyspnea and/or pain  LORazepam   Injectable 0.5 milliGRAM(s) IV Push every 4 hours PRN Agitation  sodium biphosphate Rectal Enema 1 Enema Rectal every 24 hours PRN constipation                             Vital Signs Last 24 Hrs  T(C): 36.6 (23 Mar 2018 07:26), Max: 38.1 (23 Mar 2018 00:05)  T(F): 97.9 (23 Mar 2018 07:26), Max: 100.5 (23 Mar 2018 00:05)  HR: 72 (23 Mar 2018 07:26) (72 - 86)  BP: 110/72 (23 Mar 2018 07:26) (87/50 - 110/72)  BP(mean): --  RR: 15 (23 Mar 2018 07:26) (15 - 16)  SpO2: 100% (23 Mar 2018 07:26) (100% - 100%)    PHYSICAL EXAM:  cachectic female in no respiratory distress  103/51 69 98.0 100% 16  HEENT: severe bitemporal wasting  Neck: no JVD no nodes no thyromegaly  Lungs: decreased breath sounds and rales at bases  Heart: s1s2 no audible murmurs  Abd: soft ND NT no masses no organomegaly  Ext: no edema, no redness

## 2018-03-23 NOTE — PROGRESS NOTE ADULT - SUBJECTIVE AND OBJECTIVE BOX
CHIEF COMPLAINT:Patient is a 83y old  Female who presents with a chief complaint of dyspnea (20 Mar 2018 22:20)    	  REVIEW OF SYSTEMS:  CONSTITUTIONAL: No fever, weight loss, or fatigue  EYES: No eye pain, visual disturbances, or discharge  ENMT:  No difficulty hearing, tinnitus, vertigo; No sinus or throat pain  NECK: No pain or stiffness  RESPIRATORY: No cough, wheezing, chills or hemoptysis; No Shortness of Breath  CARDIOVASCULAR: No chest pain, palpitations, passing out, dizziness, or leg swelling  GASTROINTESTINAL: No abdominal or epigastric pain. No nausea, vomiting, or hematemesis; No diarrhea or constipation. No melena or hematochezia.  GENITOURINARY: No dysuria, frequency, hematuria, or incontinence  NEUROLOGICAL: No headaches, memory loss, loss of strength, numbness, or tremors  SKIN: No itching, burning, rashes, or lesions   LYMPH Nodes: No enlarged glands  ENDOCRINE: No heat or cold intolerance; No hair loss  MUSCULOSKELETAL: No joint pain or swelling; No muscle, back, or extremity pain  PSYCHIATRIC: No depression, anxiety, mood swings, or difficulty sleeping  HEME/LYMPH: No easy bruising, or bleeding gums  ALLERY AND IMMUNOLOGIC: No hives or eczema	    [ ] All others negative	  [ ] Unable to obtain    PHYSICAL EXAM:  T(C): 36.6 (03-23-18 @ 07:26), Max: 38.1 (03-23-18 @ 00:05)  HR: 80 (03-23-18 @ 21:06) (72 - 86)  BP: 103/48 (03-23-18 @ 21:06) (87/50 - 110/72)  RR: 15 (03-23-18 @ 07:26) (15 - 16)  SpO2: 100% (03-23-18 @ 07:26) (100% - 100%)  Wt(kg): --  I&O's Summary    22 Mar 2018 07:01  -  23 Mar 2018 07:00  --------------------------------------------------------  IN: 0 mL / OUT: 1930 mL / NET: -1930 mL    23 Mar 2018 07:01  -  23 Mar 2018 23:35  --------------------------------------------------------  IN: 0 mL / OUT: 400 mL / NET: -400 mL        Appearance: Normal	  HEENT:   Normal oral mucosa, PERRL, EOMI	  Lymphatic: No lymphadenopathy  Cardiovascular: Normal S1 S2, No JVD, No murmurs, No edema  Respiratory: Lungs clear to auscultation	  Psychiatry: A & O x 0 at baseline.  Gastrointestinal:  Soft, Non-tender, + BS	  Skin: No rashes, No ecchymoses, No cyanosis	  Neurologic: Non-focal  Extremities: Normal range of motion, No clubbing, cyanosis or edema  Vascular: Peripheral pulses palpable 2+ bilaterally    MEDICATIONS  (STANDING):  clindamycin IVPB 900 milliGRAM(s) IV Intermittent every 8 hours  fluconAZOLE IVPB 200 milliGRAM(s) IV Intermittent every 24 hours      TELEMETRY: 	    ECG:  	  RADIOLOGY:  OTHER: 	  	        CARDIAC MARKERS:                          proBNP:   Lipid Profile:   HgA1c: Hemoglobin A1C, Whole Blood: 6.8 % (03-13 @ 09:35)    TSH: Thyroid Stimulating Hormone, Serum: 1.82 uU/mL (03-13 @ 07:03)

## 2018-03-24 PROCEDURE — 99232 SBSQ HOSP IP/OBS MODERATE 35: CPT

## 2018-03-24 RX ADMIN — Medication 100 MILLIGRAM(S): at 14:13

## 2018-03-24 RX ADMIN — ROBINUL 0.2 MILLIGRAM(S): 0.2 INJECTION INTRAMUSCULAR; INTRAVENOUS at 05:41

## 2018-03-24 RX ADMIN — HYDROMORPHONE HYDROCHLORIDE 0.5 MILLIGRAM(S): 2 INJECTION INTRAMUSCULAR; INTRAVENOUS; SUBCUTANEOUS at 11:00

## 2018-03-24 RX ADMIN — Medication 100 MILLIGRAM(S): at 22:57

## 2018-03-24 RX ADMIN — HYDROMORPHONE HYDROCHLORIDE 0.5 MILLIGRAM(S): 2 INJECTION INTRAMUSCULAR; INTRAVENOUS; SUBCUTANEOUS at 10:47

## 2018-03-24 RX ADMIN — Medication 650 MILLIGRAM(S): at 16:33

## 2018-03-24 RX ADMIN — FLUCONAZOLE 100 MILLIGRAM(S): 150 TABLET ORAL at 05:41

## 2018-03-24 RX ADMIN — Medication 100 MILLIGRAM(S): at 05:41

## 2018-03-24 NOTE — PROGRESS NOTE ADULT - SUBJECTIVE AND OBJECTIVE BOX
DECLAN CROCKER                    83y  Female    Allergies    penicillin (Rash)  sulfa drugs (Unknown)    Intolerances        Symptoms:  Pain (1-10):  Dyspnea:  Nausea/Vomiting:  Secretions:   Agitation:  Symptom Requiring Inpatient Hospice Admission:    Overnight events/interim history:    HPI:  83 year old female with hospice diagnosis of advanced dementia with poor appetite and weight loss recently admitted from the NH to [facility] with sepsis.  The patient was placed on IV pressors and IV antibiotics.  Her condition improved somewhat but overall she did not return to baseline.  The patient was noted to have increasing pain.  The surrogate elected for hospice and the patient was admitted to the IPU at [facility] for IV management of pain. (20 Mar 2018 22:20)          PPSV2:     Code Status:          MEDICATIONS  (STANDING):  clindamycin IVPB 900 milliGRAM(s) IV Intermittent every 8 hours  fluconAZOLE IVPB 200 milliGRAM(s) IV Intermittent every 24 hours    MEDICATIONS  (PRN):  acetaminophen  Suppository 650 milliGRAM(s) Rectal four times a day PRN For Temp greater than 38 C (100.4 F)  glycopyrrolate Injectable 0.2 milliGRAM(s) IV Push every 6 hours PRN secretions  HYDROmorphone  Injectable 0.5 milliGRAM(s) IV Push every 1 hour PRN dyspnea and/or pain  LORazepam   Injectable 0.5 milliGRAM(s) IV Push every 4 hours PRN Agitation  sodium biphosphate Rectal Enema 1 Enema Rectal every 24 hours PRN constipation                             Vital Signs Last 24 Hrs  T(C): 37.1 (24 Mar 2018 00:22), Max: 37.1 (24 Mar 2018 00:22)  T(F): 98.7 (24 Mar 2018 00:22), Max: 98.7 (24 Mar 2018 00:22)  HR: 91 (24 Mar 2018 00:22) (80 - 91)  BP: 93/71 (24 Mar 2018 00:22) (93/71 - 103/48)  BP(mean): --  RR: 17 (24 Mar 2018 00:22) (17 - 17)  SpO2: 100% (24 Mar 2018 00:22) (100% - 100%)    PHYSICAL EXAM: DECLAN CROCKER                    83y  Female    Allergies    penicillin (Rash)  sulfa drugs (Unknown)    Intolerances    Symptoms:  Pain (1-10): mod in sacrum  Dyspnea: none  Nausea/Vomiting: none  Secretions: none  Agitation: none  Symptom Requiring Inpatient Hospice Admission: pain in sacrum    Overnight events/interim history: more pain over night, low BP    HPI:  83 year old female with hospice diagnosis of advanced dementia with poor appetite and weight loss recently admitted from the NH to [facility] with sepsis.  The patient was placed on IV pressors and IV antibiotics.  Her condition improved somewhat but overall she did not return to baseline.  The patient was noted to have increasing pain.  The surrogate elected for hospice and the patient was admitted to the IPU at [facility] for IV management of pain. (20 Mar 2018 22:20)          PPSV2: 20    Code Status: DNR/DNI    MEDICATIONS  (STANDING):  clindamycin IVPB 900 milliGRAM(s) IV Intermittent every 8 hours  fluconAZOLE IVPB 200 milliGRAM(s) IV Intermittent every 24 hours    MEDICATIONS  (PRN):  acetaminophen  Suppository 650 milliGRAM(s) Rectal four times a day PRN For Temp greater than 38 C (100.4 F)  glycopyrrolate Injectable 0.2 milliGRAM(s) IV Push every 6 hours PRN secretions  HYDROmorphone  Injectable 0.5 milliGRAM(s) IV Push every 1 hour PRN dyspnea and/or pain  LORazepam   Injectable 0.5 milliGRAM(s) IV Push every 4 hours PRN Agitation  sodium biphosphate Rectal Enema 1 Enema Rectal every 24 hours PRN constipation                             Vital Signs Last 24 Hrs  T(C): 37.1 (24 Mar 2018 00:22), Max: 37.1 (24 Mar 2018 00:22)  T(F): 98.7 (24 Mar 2018 00:22), Max: 98.7 (24 Mar 2018 00:22)  HR: 91 (24 Mar 2018 00:22) (80 - 91)  BP: 93/71 (24 Mar 2018 00:22) (93/71 - 103/48)  BP(mean): --  RR: 17 (24 Mar 2018 00:22) (17 - 17)  SpO2: 100% (24 Mar 2018 00:22) (100% - 100%)    PHYSICAL EXAM:    alert and awake - can communicate occ words but mostly lethargic  no respiratory distress  contracted x4, severely tender sacral ulcers stage IV  no teeth  abd soft  pompa

## 2018-03-24 NOTE — PROGRESS NOTE ADULT - PROBLEM SELECTOR PLAN 7
treatment as per protocol  complete IV antibiotics treatment as per protocol  complete IV antibiotics  gentle wound care

## 2018-03-25 RX ADMIN — Medication 100 MILLIGRAM(S): at 22:22

## 2018-03-25 RX ADMIN — Medication 100 MILLIGRAM(S): at 13:42

## 2018-03-25 RX ADMIN — ROBINUL 0.2 MILLIGRAM(S): 0.2 INJECTION INTRAMUSCULAR; INTRAVENOUS at 22:23

## 2018-03-25 RX ADMIN — HYDROMORPHONE HYDROCHLORIDE 0.5 MILLIGRAM(S): 2 INJECTION INTRAMUSCULAR; INTRAVENOUS; SUBCUTANEOUS at 14:00

## 2018-03-25 RX ADMIN — HYDROMORPHONE HYDROCHLORIDE 0.5 MILLIGRAM(S): 2 INJECTION INTRAMUSCULAR; INTRAVENOUS; SUBCUTANEOUS at 15:46

## 2018-03-25 RX ADMIN — Medication 100 MILLIGRAM(S): at 05:41

## 2018-03-25 RX ADMIN — HYDROMORPHONE HYDROCHLORIDE 0.5 MILLIGRAM(S): 2 INJECTION INTRAMUSCULAR; INTRAVENOUS; SUBCUTANEOUS at 07:04

## 2018-03-25 RX ADMIN — HYDROMORPHONE HYDROCHLORIDE 0.5 MILLIGRAM(S): 2 INJECTION INTRAMUSCULAR; INTRAVENOUS; SUBCUTANEOUS at 13:42

## 2018-03-25 RX ADMIN — HYDROMORPHONE HYDROCHLORIDE 0.5 MILLIGRAM(S): 2 INJECTION INTRAMUSCULAR; INTRAVENOUS; SUBCUTANEOUS at 16:00

## 2018-03-25 RX ADMIN — HYDROMORPHONE HYDROCHLORIDE 0.5 MILLIGRAM(S): 2 INJECTION INTRAMUSCULAR; INTRAVENOUS; SUBCUTANEOUS at 06:40

## 2018-03-25 RX ADMIN — FLUCONAZOLE 100 MILLIGRAM(S): 150 TABLET ORAL at 06:40

## 2018-03-25 NOTE — PROGRESS NOTE ADULT - SUBJECTIVE AND OBJECTIVE BOX
CHIEF COMPLAINT:Patient is a 83y old  Female who presents with a chief complaint of dyspnea (20 Mar 2018 22:20)    	  REVIEW OF SYSTEMS:  CONSTITUTIONAL: No fever, weight loss, or fatigue  EYES: No eye pain, visual disturbances, or discharge  ENMT:  No difficulty hearing, tinnitus, vertigo; No sinus or throat pain  NECK: No pain or stiffness  RESPIRATORY: No cough, wheezing, chills or hemoptysis; No Shortness of Breath  CARDIOVASCULAR: No chest pain, palpitations, passing out, dizziness, or leg swelling  GASTROINTESTINAL: No abdominal or epigastric pain. No nausea, vomiting, or hematemesis; No diarrhea or constipation. No melena or hematochezia.  GENITOURINARY: No dysuria, frequency, hematuria, or incontinence  NEUROLOGICAL: No headaches, memory loss, loss of strength, numbness, or tremors  SKIN: No itching, burning, rashes, or lesions   LYMPH Nodes: No enlarged glands  ENDOCRINE: No heat or cold intolerance; No hair loss  MUSCULOSKELETAL: No joint pain or swelling; No muscle, back, or extremity pain  PSYCHIATRIC: No depression, anxiety, mood swings, or difficulty sleeping  HEME/LYMPH: No easy bruising, or bleeding gums  ALLERY AND IMMUNOLOGIC: No hives or eczema	    [ ] All others negative	  [ ] Unable to obtain    PHYSICAL EXAM:  T(C): 37 (03-25-18 @ 15:52), Max: 37 (03-24-18 @ 23:59)  HR: 65 (03-25-18 @ 15:52) (65 - 80)  BP: 94/57 (03-25-18 @ 15:52) (94/57 - 101/53)  RR: 17 (03-25-18 @ 15:52) (16 - 17)  SpO2: 94% (03-25-18 @ 15:52) (94% - 100%)  Wt(kg): --  I&O's Summary    24 Mar 2018 07:01  -  25 Mar 2018 07:00  --------------------------------------------------------  IN: 0 mL / OUT: 350 mL / NET: -350 mL    25 Mar 2018 07:01  -  25 Mar 2018 23:26  --------------------------------------------------------  IN: 0 mL / OUT: 200 mL / NET: -200 mL        Appearance: Normal	  HEENT:   Normal oral mucosa, PERRL, EOMI	  Lymphatic: No lymphadenopathy  Cardiovascular: Normal S1 S2, No JVD, No murmurs, No edema  Respiratory: Lungs clear to auscultation	  Psychiatry: A & O x 0 at baseline  unresponsive.  Gastrointestinal:  Soft, Non-tender, + BS	  Skin: No rashes, No ecchymoses, No cyanosis	  Neurologic: Non-focal  Extremities: Normal range of motion, No clubbing, cyanosis or edema  Vascular: Peripheral pulses palpable 2+ bilaterally    MEDICATIONS  (STANDING):  clindamycin IVPB 900 milliGRAM(s) IV Intermittent every 8 hours  fluconAZOLE IVPB 200 milliGRAM(s) IV Intermittent every 24 hours      TELEMETRY: 	    ECG:  	  RADIOLOGY:  OTHER: 	  	        CARDIAC MARKERS:                          proBNP:   Lipid Profile:   HgA1c: Hemoglobin A1C, Whole Blood: 6.8 % (03-13 @ 09:35)    TSH: Thyroid Stimulating Hormone, Serum: 1.82 uU/mL (03-13 @ 07:03)

## 2018-03-26 RX ADMIN — HYDROMORPHONE HYDROCHLORIDE 0.5 MILLIGRAM(S): 2 INJECTION INTRAMUSCULAR; INTRAVENOUS; SUBCUTANEOUS at 21:22

## 2018-03-26 RX ADMIN — FLUCONAZOLE 100 MILLIGRAM(S): 150 TABLET ORAL at 06:02

## 2018-03-26 RX ADMIN — Medication 100 MILLIGRAM(S): at 21:20

## 2018-03-26 RX ADMIN — Medication 100 MILLIGRAM(S): at 13:58

## 2018-03-26 RX ADMIN — HYDROMORPHONE HYDROCHLORIDE 0.5 MILLIGRAM(S): 2 INJECTION INTRAMUSCULAR; INTRAVENOUS; SUBCUTANEOUS at 06:58

## 2018-03-26 RX ADMIN — HYDROMORPHONE HYDROCHLORIDE 0.5 MILLIGRAM(S): 2 INJECTION INTRAMUSCULAR; INTRAVENOUS; SUBCUTANEOUS at 21:25

## 2018-03-26 RX ADMIN — Medication 100 MILLIGRAM(S): at 06:01

## 2018-03-26 RX ADMIN — ROBINUL 0.2 MILLIGRAM(S): 0.2 INJECTION INTRAMUSCULAR; INTRAVENOUS at 21:20

## 2018-03-26 RX ADMIN — HYDROMORPHONE HYDROCHLORIDE 0.5 MILLIGRAM(S): 2 INJECTION INTRAMUSCULAR; INTRAVENOUS; SUBCUTANEOUS at 07:00

## 2018-03-26 RX ADMIN — ROBINUL 0.2 MILLIGRAM(S): 0.2 INJECTION INTRAMUSCULAR; INTRAVENOUS at 06:01

## 2018-03-26 NOTE — PROGRESS NOTE ADULT - SUBJECTIVE AND OBJECTIVE BOX
CHIEF COMPLAINT:Patient is a 83y old  Female who presents with a chief complaint of dyspnea (20 Mar 2018 22:20)    	  REVIEW OF SYSTEMS:  CONSTITUTIONAL: No fever, weight loss, or fatigue  EYES: No eye pain, visual disturbances, or discharge  ENMT:  No difficulty hearing, tinnitus, vertigo; No sinus or throat pain  NECK: No pain or stiffness  RESPIRATORY: No cough, wheezing, chills or hemoptysis; No Shortness of Breath  CARDIOVASCULAR: No chest pain, palpitations, passing out, dizziness, or leg swelling  GASTROINTESTINAL: No abdominal or epigastric pain. No nausea, vomiting, or hematemesis; No diarrhea or constipation. No melena or hematochezia.  GENITOURINARY: No dysuria, frequency, hematuria, or incontinence  NEUROLOGICAL: No headaches, memory loss, loss of strength, numbness, or tremors  SKIN: No itching, burning, rashes, or lesions   LYMPH Nodes: No enlarged glands  ENDOCRINE: No heat or cold intolerance; No hair loss  MUSCULOSKELETAL: No joint pain or swelling; No muscle, back, or extremity pain  PSYCHIATRIC: No depression, anxiety, mood swings, or difficulty sleeping  HEME/LYMPH: No easy bruising, or bleeding gums  ALLERY AND IMMUNOLOGIC: No hives or eczema	    [ ] All others negative	  [ ] Unable to obtain    PHYSICAL EXAM:  T(C): 37.1 (03-26-18 @ 08:02), Max: 37.2 (03-26-18 @ 00:43)  HR: 92 (03-26-18 @ 08:02) (87 - 92)  BP: 104/44 (03-26-18 @ 08:02) (89/68 - 104/44)  RR: 16 (03-26-18 @ 08:02) (16 - 18)  SpO2: 82% (03-26-18 @ 08:02) (82% - 100%)  Wt(kg): --  I&O's Summary    25 Mar 2018 07:01  -  26 Mar 2018 07:00  --------------------------------------------------------  IN: 0 mL / OUT: 425 mL / NET: -425 mL    26 Mar 2018 07:01  -  26 Mar 2018 23:47  --------------------------------------------------------  IN: 0 mL / OUT: 250 mL / NET: -250 mL        Appearance: Normal	  HEENT:   Normal oral mucosa, PERRL, EOMI	  Lymphatic: No lymphadenopathy  Cardiovascular: Normal S1 S2, No JVD, No murmurs, No edema  Respiratory: Lungs clear to auscultation	  Psychiatry: A & O x 3, Mood & affect appropriate  Gastrointestinal:  Soft, Non-tender, + BS	  Skin: No rashes, No ecchymoses, No cyanosis	  Neurologic: Non-focal  Extremities: Normal range of motion, No clubbing, cyanosis or edema  Vascular: Peripheral pulses palpable 2+ bilaterally    MEDICATIONS  (STANDING):  clindamycin IVPB 900 milliGRAM(s) IV Intermittent every 8 hours  fluconAZOLE IVPB 200 milliGRAM(s) IV Intermittent every 24 hours      TELEMETRY: 	    ECG:  	  RADIOLOGY:  OTHER: 	  	        CARDIAC MARKERS:                          proBNP:   Lipid Profile:   HgA1c: Hemoglobin A1C, Whole Blood: 6.8 % (03-13 @ 09:35)    TSH: Thyroid Stimulating Hormone, Serum: 1.82 uU/mL (03-13 @ 07:03)

## 2018-03-26 NOTE — PROGRESS NOTE ADULT - SUBJECTIVE AND OBJECTIVE BOX
DECLAN CROCKER                    83y  Female    Allergies    penicillin (Rash)  sulfa drugs (Unknown)    Intolerances        Symptoms:  Pain (1-10): 0  Dyspnea: 0  Nausea/Vomitin  Secretions: 0  Agitation: 0  Symptom Requiring Inpatient Hospice Admission: dyspnea and pain    Overnight events/interim history: robinul given times one for secretions with improvement, dilaudid given times one for dyspnea with improvement    HPI:  83 year old female with hospice diagnosis of advanced dementia with poor appetite and weight loss recently admitted from the NH to [facility] with sepsis.  The patient was placed on IV pressors and IV antibiotics.  Her condition improved somewhat but overall she did not return to baseline.  The patient was noted to have increasing pain.  The surrogate elected for hospice and the patient was admitted to the IPU at [facility] for IV management of pain. (20 Mar 2018 22:20)          PPSV2: 10%    Code Status: DNR          MEDICATIONS  (STANDING):  clindamycin IVPB 900 milliGRAM(s) IV Intermittent every 8 hours  fluconAZOLE IVPB 200 milliGRAM(s) IV Intermittent every 24 hours    MEDICATIONS  (PRN):  acetaminophen  Suppository 650 milliGRAM(s) Rectal four times a day PRN For Temp greater than 38 C (100.4 F)  glycopyrrolate Injectable 0.2 milliGRAM(s) IV Push every 6 hours PRN secretions  HYDROmorphone  Injectable 0.5 milliGRAM(s) IV Push every 1 hour PRN dyspnea and/or pain  LORazepam   Injectable 0.5 milliGRAM(s) IV Push every 4 hours PRN Agitation  sodium biphosphate Rectal Enema 1 Enema Rectal every 24 hours PRN constipation                             Vital Signs Last 24 Hrs  T(C): 36.6 (23 Mar 2018 07:26), Max: 38.1 (23 Mar 2018 00:05)  T(F): 97.9 (23 Mar 2018 07:26), Max: 100.5 (23 Mar 2018 00:05)  HR: 72 (23 Mar 2018 07:26) (72 - 86)  BP: 110/72 (23 Mar 2018 07:26) (87/50 - 110/72)  BP(mean): --  RR: 15 (23 Mar 2018 07:26) (15 - 16)  SpO2: 100% (23 Mar 2018 07:26) (100% - 100%)    PHYSICAL EXAM:  cachectic female in no respiratory distress  104/44 92 98.8 16 82%  HEENT: severe bitemporal wasting  Neck: no JVD no nodes no thyromegaly  Lungs: decreased breath sounds and rales at bases  Heart: s1s2 no audible murmurs  Abd: soft ND NT no masses no organomegaly  Ext: no edema, no redness

## 2018-03-27 RX ADMIN — FLUCONAZOLE 100 MILLIGRAM(S): 150 TABLET ORAL at 05:27

## 2018-03-27 RX ADMIN — Medication 100 MILLIGRAM(S): at 13:35

## 2018-03-27 RX ADMIN — HYDROMORPHONE HYDROCHLORIDE 0.5 MILLIGRAM(S): 2 INJECTION INTRAMUSCULAR; INTRAVENOUS; SUBCUTANEOUS at 10:22

## 2018-03-27 RX ADMIN — ROBINUL 0.2 MILLIGRAM(S): 0.2 INJECTION INTRAMUSCULAR; INTRAVENOUS at 21:50

## 2018-03-27 RX ADMIN — Medication 100 MILLIGRAM(S): at 21:50

## 2018-03-27 RX ADMIN — HYDROMORPHONE HYDROCHLORIDE 0.5 MILLIGRAM(S): 2 INJECTION INTRAMUSCULAR; INTRAVENOUS; SUBCUTANEOUS at 21:50

## 2018-03-27 RX ADMIN — HYDROMORPHONE HYDROCHLORIDE 0.5 MILLIGRAM(S): 2 INJECTION INTRAMUSCULAR; INTRAVENOUS; SUBCUTANEOUS at 05:27

## 2018-03-27 RX ADMIN — HYDROMORPHONE HYDROCHLORIDE 0.5 MILLIGRAM(S): 2 INJECTION INTRAMUSCULAR; INTRAVENOUS; SUBCUTANEOUS at 21:52

## 2018-03-27 RX ADMIN — ROBINUL 0.2 MILLIGRAM(S): 0.2 INJECTION INTRAMUSCULAR; INTRAVENOUS at 05:28

## 2018-03-27 RX ADMIN — Medication 100 MILLIGRAM(S): at 05:27

## 2018-03-27 RX ADMIN — HYDROMORPHONE HYDROCHLORIDE 0.5 MILLIGRAM(S): 2 INJECTION INTRAMUSCULAR; INTRAVENOUS; SUBCUTANEOUS at 10:40

## 2018-03-27 RX ADMIN — HYDROMORPHONE HYDROCHLORIDE 0.5 MILLIGRAM(S): 2 INJECTION INTRAMUSCULAR; INTRAVENOUS; SUBCUTANEOUS at 05:29

## 2018-03-27 NOTE — PROGRESS NOTE ADULT - SUBJECTIVE AND OBJECTIVE BOX
DECLAN CROCKER                    83y  Female    Allergies    penicillin (Rash)  sulfa drugs (Unknown)    Intolerances        Symptoms:  Pain (1-10): 0  Dyspnea: 0  Nausea/Vomitin  Secretions: 0  Agitation: 0  Symptom Requiring Inpatient Hospice Admission: dyspnea and pain    Overnight events/interim history: robinul given times one for secretions with improvement, dilaudid given times 2 for dyspnea with improvement    HPI:  83 year old female with hospice diagnosis of advanced dementia with poor appetite and weight loss recently admitted from the NH to [facility] with sepsis.  The patient was placed on IV pressors and IV antibiotics.  Her condition improved somewhat but overall she did not return to baseline.  The patient was noted to have increasing pain.  The surrogate elected for hospice and the patient was admitted to the IPU at [facility] for IV management of pain. (20 Mar 2018 22:20)          PPSV2: 10%    Code Status: DNR          MEDICATIONS  (STANDING):  clindamycin IVPB 900 milliGRAM(s) IV Intermittent every 8 hours  fluconAZOLE IVPB 200 milliGRAM(s) IV Intermittent every 24 hours    MEDICATIONS  (PRN):  acetaminophen  Suppository 650 milliGRAM(s) Rectal four times a day PRN For Temp greater than 38 C (100.4 F)  glycopyrrolate Injectable 0.2 milliGRAM(s) IV Push every 6 hours PRN secretions  HYDROmorphone  Injectable 0.5 milliGRAM(s) IV Push every 1 hour PRN dyspnea and/or pain  LORazepam   Injectable 0.5 milliGRAM(s) IV Push every 4 hours PRN Agitation  sodium biphosphate Rectal Enema 1 Enema Rectal every 24 hours PRN constipation                             Vital Signs Last 24 Hrs  T(C): 36.6 (23 Mar 2018 07:26), Max: 38.1 (23 Mar 2018 00:05)  T(F): 97.9 (23 Mar 2018 07:26), Max: 100.5 (23 Mar 2018 00:05)  HR: 72 (23 Mar 2018 07:26) (72 - 86)  BP: 110/72 (23 Mar 2018 07:26) (87/50 - 110/72)  BP(mean): --  RR: 15 (23 Mar 2018 07:26) (15 - 16)  SpO2: 100% (23 Mar 2018 07:26) (100% - 100%)    PHYSICAL EXAM:  cachectic female in no respiratory distress  95/39 75 98.7 16 100%  HEENT: severe bitemporal wasting  Neck: no JVD no nodes no thyromegaly  Lungs: decreased breath sounds and rales at bases  Heart: s1s2 no audible murmurs  Abd: soft ND NT no masses no organomegaly  Ext: no edema, no redness

## 2018-03-27 NOTE — PROGRESS NOTE ADULT - SUBJECTIVE AND OBJECTIVE BOX
CHIEF COMPLAINT:Patient is a 83y old  Female who presents with a chief complaint of dyspnea (20 Mar 2018 22:20)    	  REVIEW OF SYSTEMS:  CONSTITUTIONAL: No fever, weight loss, or fatigue  EYES: No eye pain, visual disturbances, or discharge  ENMT:  No difficulty hearing, tinnitus, vertigo; No sinus or throat pain  NECK: No pain or stiffness  RESPIRATORY: No cough, wheezing, chills or hemoptysis; No Shortness of Breath  CARDIOVASCULAR: No chest pain, palpitations, passing out, dizziness, or leg swelling  GASTROINTESTINAL: No abdominal or epigastric pain. No nausea, vomiting, or hematemesis; No diarrhea or constipation. No melena or hematochezia.  GENITOURINARY: No dysuria, frequency, hematuria, or incontinence  NEUROLOGICAL: No headaches, memory loss, loss of strength, numbness, or tremors  SKIN: No itching, burning, rashes, or lesions   LYMPH Nodes: No enlarged glands  ENDOCRINE: No heat or cold intolerance; No hair loss  MUSCULOSKELETAL: No joint pain or swelling; No muscle, back, or extremity pain  PSYCHIATRIC: No depression, anxiety, mood swings, or difficulty sleeping  HEME/LYMPH: No easy bruising, or bleeding gums  ALLERY AND IMMUNOLOGIC: No hives or eczema	    [ ] All others negative	  [ ] Unable to obtain    PHYSICAL EXAM:  T(C): 37.1 (03-27-18 @ 16:07), Max: 37.2 (03-26-18 @ 23:46)  HR: 75 (03-27-18 @ 16:07) (75 - 88)  BP: 95/39 (03-27-18 @ 16:07) (95/39 - 96/48)  RR: 16 (03-27-18 @ 16:07) (16 - 16)  SpO2: 100% (03-26-18 @ 23:46) (100% - 100%)  Wt(kg): --  I&O's Summary    26 Mar 2018 07:01  -  27 Mar 2018 07:00  --------------------------------------------------------  IN: 0 mL / OUT: 425 mL / NET: -425 mL    27 Mar 2018 07:01  -  27 Mar 2018 22:35  --------------------------------------------------------  IN: 0 mL / OUT: 140 mL / NET: -140 mL        Appearance: lethargic but comfortable.	  HEENT:   Normal oral mucosa, PERRL, EOMI	  Lymphatic: No lymphadenopathy  Cardiovascular: Normal S1 S2, No JVD, No murmurs, No edema  Respiratory: Lungs clear to auscultation	  Psychiatry: A & O x 0 at baseline.  Gastrointestinal:  Soft, Non-tender, + BS	  Skin: No rashes, No ecchymoses, No cyanosis	  Neurologic: Non-focal  Extremities: Normal range of motion, No clubbing, cyanosis or edema  Vascular: Peripheral pulses palpable 2+ bilaterally    MEDICATIONS  (STANDING):  clindamycin IVPB 900 milliGRAM(s) IV Intermittent every 8 hours  fluconAZOLE IVPB 200 milliGRAM(s) IV Intermittent every 24 hours      TELEMETRY: 	    ECG:  	  RADIOLOGY:  OTHER: 	  	        CARDIAC MARKERS:                          proBNP:   Lipid Profile:   HgA1c: Hemoglobin A1C, Whole Blood: 6.8 % (03-13 @ 09:35)    TSH: Thyroid Stimulating Hormone, Serum: 1.82 uU/mL (03-13 @ 07:03)

## 2018-03-28 RX ADMIN — Medication 100 MILLIGRAM(S): at 22:42

## 2018-03-28 RX ADMIN — Medication 100 MILLIGRAM(S): at 14:45

## 2018-03-28 RX ADMIN — Medication 100 MILLIGRAM(S): at 06:13

## 2018-03-28 RX ADMIN — FLUCONAZOLE 100 MILLIGRAM(S): 150 TABLET ORAL at 06:14

## 2018-03-28 NOTE — PROGRESS NOTE ADULT - SUBJECTIVE AND OBJECTIVE BOX
DECLAN CROCKER                    83y  Female    Allergies    penicillin (Rash)  sulfa drugs (Unknown)    Intolerances        Symptoms:  Pain (1-10): 0  Dyspnea: 0  Nausea/Vomitin  Secretions: 0  Agitation: 0  Symptom Requiring Inpatient Hospice Admission: dyspnea and pain    Overnight events/interim history: robinul given times one for secretions with improvement, dilaudid given times 2 for dyspnea with improvement    HPI:  83 year old female with hospice diagnosis of advanced dementia with poor appetite and weight loss recently admitted from the NH to [facility] with sepsis.  The patient was placed on IV pressors and IV antibiotics.  Her condition improved somewhat but overall she did not return to baseline.  The patient was noted to have increasing pain.  The surrogate elected for hospice and the patient was admitted to the IPU at [facility] for IV management of pain. (20 Mar 2018 22:20)          PPSV2: 10%    Code Status: DNR          MEDICATIONS  (STANDING):  clindamycin IVPB 900 milliGRAM(s) IV Intermittent every 8 hours  fluconAZOLE IVPB 200 milliGRAM(s) IV Intermittent every 24 hours    MEDICATIONS  (PRN):  acetaminophen  Suppository 650 milliGRAM(s) Rectal four times a day PRN For Temp greater than 38 C (100.4 F)  glycopyrrolate Injectable 0.2 milliGRAM(s) IV Push every 6 hours PRN secretions  HYDROmorphone  Injectable 0.5 milliGRAM(s) IV Push every 1 hour PRN dyspnea and/or pain  LORazepam   Injectable 0.5 milliGRAM(s) IV Push every 4 hours PRN Agitation  sodium biphosphate Rectal Enema 1 Enema Rectal every 24 hours PRN constipation                             Vital Signs Last 24 Hrs  T(C): 36.6 (23 Mar 2018 07:26), Max: 38.1 (23 Mar 2018 00:05)  T(F): 97.9 (23 Mar 2018 07:26), Max: 100.5 (23 Mar 2018 00:05)  HR: 72 (23 Mar 2018 07:26) (72 - 86)  BP: 110/72 (23 Mar 2018 07:26) (87/50 - 110/72)  BP(mean): --  RR: 15 (23 Mar 2018 07:26) (15 - 16)  SpO2: 100% (23 Mar 2018 07:26) (100% - 100%)    PHYSICAL EXAM:  cachectic female in no respiratory distress  85/37 16 98% 78 99.0  HEENT: severe bitemporal wasting  Neck: no JVD no nodes no thyromegaly  Lungs: decreased breath sounds and rales at bases  Heart: s1s2 no audible murmurs  Abd: soft ND NT no masses no organomegaly  Ext: no edema, no redness

## 2018-03-28 NOTE — PROGRESS NOTE ADULT - SUBJECTIVE AND OBJECTIVE BOX
CHIEF COMPLAINT:Patient is a 83y old  Female who presents with a chief complaint of dyspnea (20 Mar 2018 22:20)    	  REVIEW OF SYSTEMS:  CONSTITUTIONAL: No fever, weight loss, or fatigue  EYES: No eye pain, visual disturbances, or discharge  ENMT:  No difficulty hearing, tinnitus, vertigo; No sinus or throat pain  NECK: No pain or stiffness  RESPIRATORY: No cough, wheezing, chills or hemoptysis; No Shortness of Breath  CARDIOVASCULAR: No chest pain, palpitations, passing out, dizziness, or leg swelling  GASTROINTESTINAL: No abdominal or epigastric pain. No nausea, vomiting, or hematemesis; No diarrhea or constipation. No melena or hematochezia.  GENITOURINARY: No dysuria, frequency, hematuria, or incontinence  NEUROLOGICAL: No headaches, memory loss, loss of strength, numbness, or tremors  SKIN: No itching, burning, rashes, or lesions   LYMPH Nodes: No enlarged glands  ENDOCRINE: No heat or cold intolerance; No hair loss  MUSCULOSKELETAL: No joint pain or swelling; No muscle, back, or extremity pain  PSYCHIATRIC: No depression, anxiety, mood swings, or difficulty sleeping  HEME/LYMPH: No easy bruising, or bleeding gums  ALLERY AND IMMUNOLOGIC: No hives or eczema	    [ ] All others negative	  [ ] Unable to obtain    PHYSICAL EXAM:  T(C): 37.2 (03-28-18 @ 15:55), Max: 37.2 (03-28-18 @ 15:55)  HR: 78 (03-28-18 @ 15:55) (78 - 83)  BP: 85/37 (03-28-18 @ 15:55) (85/37 - 114/57)  RR: 16 (03-28-18 @ 15:55) (16 - 18)  SpO2: 98% (03-28-18 @ 15:55) (62% - 100%)  Wt(kg): --  I&O's Summary    27 Mar 2018 07:01  -  28 Mar 2018 07:00  --------------------------------------------------------  IN: 0 mL / OUT: 340 mL / NET: -340 mL    28 Mar 2018 07:01  -  28 Mar 2018 23:47  --------------------------------------------------------  IN: 0 mL / OUT: 150 mL / NET: -150 mL        Appearance: Normal	  HEENT:   Normal oral mucosa, PERRL, EOMI	  Lymphatic: No lymphadenopathy  Cardiovascular: Normal S1 S2, No JVD, No murmurs, No edema  Respiratory: Lungs clear to auscultation	  Psychiatry: A & O x0 at baseline  Gastrointestinal:  Soft, Non-tender, + BS	  Skin: No rashes, No ecchymoses, No cyanosis	  Neurologic: Non-focal  Extremities: Normal range of motion, No clubbing, cyanosis or edema  Vascular: Peripheral pulses palpable 2+ bilaterally    MEDICATIONS  (STANDING):  clindamycin IVPB 900 milliGRAM(s) IV Intermittent every 8 hours  fluconAZOLE IVPB 200 milliGRAM(s) IV Intermittent every 24 hours      TELEMETRY: 	    ECG:  	  RADIOLOGY:  OTHER: 	  	        CARDIAC MARKERS:                          proBNP:   Lipid Profile:   HgA1c: Hemoglobin A1C, Whole Blood: 6.8 % (03-13 @ 09:35)    TSH: Thyroid Stimulating Hormone, Serum: 1.82 uU/mL (03-13 @ 07:03)

## 2018-03-29 RX ORDER — HYDROMORPHONE HYDROCHLORIDE 2 MG/ML
0.5 INJECTION INTRAMUSCULAR; INTRAVENOUS; SUBCUTANEOUS
Qty: 0 | Refills: 0 | Status: DISCONTINUED | OUTPATIENT
Start: 2018-03-29 | End: 2018-04-02

## 2018-03-29 RX ADMIN — Medication 100 MILLIGRAM(S): at 21:32

## 2018-03-29 RX ADMIN — Medication 0.5 MILLIGRAM(S): at 21:33

## 2018-03-29 RX ADMIN — FLUCONAZOLE 100 MILLIGRAM(S): 150 TABLET ORAL at 07:04

## 2018-03-29 RX ADMIN — Medication 100 MILLIGRAM(S): at 06:29

## 2018-03-29 RX ADMIN — Medication 100 MILLIGRAM(S): at 14:13

## 2018-03-29 NOTE — PROGRESS NOTE ADULT - SUBJECTIVE AND OBJECTIVE BOX
DECLAN CROCKER                    83y  Female    Allergies    penicillin (Rash)  sulfa drugs (Unknown)    Intolerances        Symptoms:  Pain (1-10): 0  Dyspnea: 0  Nausea/Vomitin  Secretions: 0  Agitation: 0  Symptom Requiring Inpatient Hospice Admission: dyspnea and pain    Overnight events/interim history: ativan given times one for secretions    HPI:  83 year old female with hospice diagnosis of advanced dementia with poor appetite and weight loss [facility]  The patient was placed on IV pressors and IV antibiotics.  Her condition improved somewhat but overall she did not return to baseline.  The patient was noted to have increasing pain.  The surrogate elected for hospice and the [facility]. (20 Mar 2018 22:20)          PPSV2: 10%    Code Status: DNR          MEDICATIONS  (STANDING):  clindamycin IVPB 900 milliGRAM(s) IV Intermittent every 8 hours  fluconAZOLE IVPB 200 milliGRAM(s) IV Intermittent every 24 hours    MEDICATIONS  (PRN):  acetaminophen  Suppository 650 milliGRAM(s) Rectal four times a day PRN For Temp greater than 38 C (100.4 F)  glycopyrrolate Injectable 0.2 milliGRAM(s) IV Push every 6 hours PRN secretions  HYDROmorphone  Injectable 0.5 milliGRAM(s) IV Push every 1 hour PRN dyspnea and/or pain  LORazepam   Injectable 0.5 milliGRAM(s) IV Push every 4 hours PRN Agitation  sodium biphosphate Rectal Enema 1 Enema Rectal every 24 hours PRN constipation                               PHYSICAL EXAM:  cachectic female in no respiratory distress  86/66 87 98.9 16 100%  HEENT: severe bitemporal wasting  Neck: no JVD no nodes no thyromegaly  Lungs: decreased breath sounds and rales at bases  Heart: s1s2 no audible murmurs  Abd: soft ND NT no masses no organomegaly  Ext: no edema, no redness

## 2018-03-30 DIAGNOSIS — T14.8XXA OTHER INJURY OF UNSPECIFIED BODY REGION, INITIAL ENCOUNTER: ICD-10-CM

## 2018-03-30 PROCEDURE — 99232 SBSQ HOSP IP/OBS MODERATE 35: CPT

## 2018-03-30 RX ADMIN — FLUCONAZOLE 100 MILLIGRAM(S): 150 TABLET ORAL at 05:29

## 2018-03-30 RX ADMIN — Medication 100 MILLIGRAM(S): at 05:29

## 2018-03-30 RX ADMIN — Medication 100 MILLIGRAM(S): at 22:33

## 2018-03-30 RX ADMIN — HYDROMORPHONE HYDROCHLORIDE 0.5 MILLIGRAM(S): 2 INJECTION INTRAMUSCULAR; INTRAVENOUS; SUBCUTANEOUS at 12:53

## 2018-03-30 RX ADMIN — HYDROMORPHONE HYDROCHLORIDE 0.5 MILLIGRAM(S): 2 INJECTION INTRAMUSCULAR; INTRAVENOUS; SUBCUTANEOUS at 23:00

## 2018-03-30 RX ADMIN — Medication 100 MILLIGRAM(S): at 13:10

## 2018-03-30 RX ADMIN — HYDROMORPHONE HYDROCHLORIDE 0.5 MILLIGRAM(S): 2 INJECTION INTRAMUSCULAR; INTRAVENOUS; SUBCUTANEOUS at 22:42

## 2018-03-30 RX ADMIN — HYDROMORPHONE HYDROCHLORIDE 0.5 MILLIGRAM(S): 2 INJECTION INTRAMUSCULAR; INTRAVENOUS; SUBCUTANEOUS at 11:25

## 2018-03-30 NOTE — PROGRESS NOTE ADULT - SUBJECTIVE AND OBJECTIVE BOX
DECLAN CROCKER                    83y  Female    Allergies    penicillin (Rash)  sulfa drugs (Unknown)    Intolerances        Symptoms:  Pain (1-10):  Dyspnea:  Nausea/Vomiting:  Secretions:   Agitation:  Symptom Requiring Inpatient Hospice Admission:    Overnight events/interim history:    HPI:  83 year old female with hospice diagnosis of advanced dementia with poor appetite and weight loss recently admitted from the NH to [facility] with sepsis.  The patient was placed on IV pressors and IV antibiotics.  Her condition improved somewhat but overall she did not return to baseline.  The patient was noted to have increasing pain.  The surrogate elected for hospice and the patient was admitted to the IPU at [facility] for IV management of pain. (20 Mar 2018 22:20)          PPSV2:     Code Status:          MEDICATIONS  (STANDING):  clindamycin IVPB 900 milliGRAM(s) IV Intermittent every 8 hours  fluconAZOLE IVPB 200 milliGRAM(s) IV Intermittent every 24 hours    MEDICATIONS  (PRN):  acetaminophen  Suppository 650 milliGRAM(s) Rectal four times a day PRN For Temp greater than 38 C (100.4 F)  glycopyrrolate Injectable 0.2 milliGRAM(s) IV Push every 6 hours PRN secretions  HYDROmorphone  Injectable 0.5 milliGRAM(s) IV Push every 1 hour PRN pain and/or dyspnea  LORazepam   Injectable 0.5 milliGRAM(s) IV Push every 4 hours PRN Agitation  sodium biphosphate Rectal Enema 1 Enema Rectal every 24 hours PRN constipation                             Vital Signs Last 24 Hrs  T(C): 36.8 (30 Mar 2018 07:24), Max: 37.2 (29 Mar 2018 15:54)  T(F): 98.2 (30 Mar 2018 07:24), Max: 98.9 (29 Mar 2018 15:54)  HR: 74 (30 Mar 2018 07:24) (74 - 87)  BP: 96/52 (30 Mar 2018 07:24) (86/66 - 96/52)  BP(mean): --  RR: 16 (30 Mar 2018 07:24) (16 - 16)  SpO2: 100% (30 Mar 2018 07:24) (100% - 100%)    PHYSICAL EXAM: JOHNDECLAN JOEL                    83y  Female    Allergies  penicillin (Rash)  sulfa drugs (Unknown)    Symptoms:  Symptom Requiring Inpatient Hospice Admission: uncontrolled pain    Overnight events/interim history: one dose of ativan and morphine each     HPI:  83 year old female with hospice diagnosis of advanced dementia with poor appetite and weight loss recently admitted from the NH to [facility] with sepsis.  The patient was placed on IV pressors and IV antibiotics.  Her condition improved somewhat but overall she did not return to baseline.  The patient was noted to have increasing pain.  The surrogate elected for hospice and the patient was admitted to the IPU at [facility] for IV management of pain. (20 Mar 2018 22:20)          PPSV2: 20%    Code Status: DNR/DNI    MEDICATIONS  (STANDING):  clindamycin IVPB 900 milliGRAM(s) IV Intermittent every 8 hours  fluconAZOLE IVPB 200 milliGRAM(s) IV Intermittent every 24 hours    MEDICATIONS  (PRN):  acetaminophen  Suppository 650 milliGRAM(s) Rectal four times a day PRN For Temp greater than 38 C (100.4 F)  glycopyrrolate Injectable 0.2 milliGRAM(s) IV Push every 6 hours PRN secretions  HYDROmorphone  Injectable 0.5 milliGRAM(s) IV Push every 1 hour PRN pain and/or dyspnea  LORazepam   Injectable 0.5 milliGRAM(s) IV Push every 4 hours PRN Agitation  sodium biphosphate Rectal Enema 1 Enema Rectal every 24 hours PRN constipation                             Vital Signs Last 24 Hrs  T(C): 36.8 (30 Mar 2018 07:24), Max: 37.2 (29 Mar 2018 15:54)  T(F): 98.2 (30 Mar 2018 07:24), Max: 98.9 (29 Mar 2018 15:54)  HR: 74 (30 Mar 2018 07:24) (74 - 87)  BP: 96/52 (30 Mar 2018 07:24) (86/66 - 96/52)  BP(mean): --  RR: 16 (30 Mar 2018 07:24) (16 - 16)  SpO2: 100% (30 Mar 2018 07:24) (100% - 100%)    PHYSICAL EXAM:  arousable but mostly lethargic, no teeth  able to take 1-2 spoonfuls of pureed foods per meal  tender significant draining sacral wounds  bedbound, weakness x4  no resp distress

## 2018-03-31 PROCEDURE — 99231 SBSQ HOSP IP/OBS SF/LOW 25: CPT

## 2018-03-31 RX ADMIN — Medication 100 MILLIGRAM(S): at 05:36

## 2018-03-31 RX ADMIN — Medication 100 MILLIGRAM(S): at 15:00

## 2018-03-31 RX ADMIN — HYDROMORPHONE HYDROCHLORIDE 0.5 MILLIGRAM(S): 2 INJECTION INTRAMUSCULAR; INTRAVENOUS; SUBCUTANEOUS at 10:08

## 2018-03-31 RX ADMIN — HYDROMORPHONE HYDROCHLORIDE 0.5 MILLIGRAM(S): 2 INJECTION INTRAMUSCULAR; INTRAVENOUS; SUBCUTANEOUS at 05:50

## 2018-03-31 RX ADMIN — Medication 100 MILLIGRAM(S): at 22:04

## 2018-03-31 RX ADMIN — HYDROMORPHONE HYDROCHLORIDE 0.5 MILLIGRAM(S): 2 INJECTION INTRAMUSCULAR; INTRAVENOUS; SUBCUTANEOUS at 05:36

## 2018-03-31 RX ADMIN — FLUCONAZOLE 100 MILLIGRAM(S): 150 TABLET ORAL at 05:36

## 2018-03-31 RX ADMIN — HYDROMORPHONE HYDROCHLORIDE 0.5 MILLIGRAM(S): 2 INJECTION INTRAMUSCULAR; INTRAVENOUS; SUBCUTANEOUS at 10:25

## 2018-03-31 NOTE — PROGRESS NOTE ADULT - SUBJECTIVE AND OBJECTIVE BOX
PRESENTING CC: shortness of breath    SUBJ:     In summary, this is a 83 year old female with hospice diagnosis of advanced dementia with poor appetite and weight loss recently admitted from the NH to [facility] with sepsis.  The patient was placed on IV pressors and IV antibiotics.  Her condition improved somewhat but overall she did not return to baseline.  The patient was noted to have increasing pain.  The surrogate elected for hospice and the patient was admitted to the IPU at [facility] for IV management of pain.    Overnight,     Symptoms:  Pain (1-10): 0  Dyspnea: 0  Nausea/Vomitin  Secretions: 0  Agitation: 0  Symptom Requiring Inpatient Hospice Admission: dyspnea, pain    CODE STATUS: DNR    Allergies    penicillin (Rash)  sulfa drugs (Unknown)    Intolerances        MEDICATIONS  (STANDING):  clindamycin IVPB 900 milliGRAM(s) IV Intermittent every 8 hours  fluconAZOLE IVPB 200 milliGRAM(s) IV Intermittent every 24 hours    MEDICATIONS  (PRN):  acetaminophen  Suppository 650 milliGRAM(s) Rectal four times a day PRN For Temp greater than 38 C (100.4 F)  glycopyrrolate Injectable 0.2 milliGRAM(s) IV Push every 6 hours PRN secretions  HYDROmorphone  Injectable 0.5 milliGRAM(s) IV Push every 1 hour PRN pain and/or dyspnea  LORazepam   Injectable 0.5 milliGRAM(s) IV Push every 4 hours PRN Agitation  sodium biphosphate Rectal Enema 1 Enema Rectal every 24 hours PRN constipation      FAMILY HISTORY:    No family history of premature coronary artery disease or sudden cardiac death    SOCIAL HISTORY:    Smoking-denies    REVIEW OF SYSTEMS:  Constitutional: [ ] fever, [ ]weight loss,  [ ]fatigue  Eyes: [ ] visual changes  Respiratory: [ ]shortness of breath;  [ ] cough, [ ]wheezing, [ ]chills, [ ]hemoptysis  Cardiovascular: [ ] chest pain, [ ]palpitations, [ ]dizziness,  [ ]leg swelling  Gastrointestinal: [ ] abdominal pain, [ ]nausea, [ ]vomiting,  [ ]diarrhea   Genitourinary: [ ] dysuria, [ ] hematuria  Neurologic: [ ] headaches [ ] tremors [ ] weakness [ ] lightheadedness  Skin: [ ] itching, [ ]burning, [ ] rashes  Endocrine: [ ] heat or cold intolerance  Musculoskeletal: [ ] joint pain or swelling; [ ] muscle, back, or extremity pain  Psychiatric: [ ] depression, [ ]anxiety, [ ]mood swings, or [ ]difficulty sleeping  Hematologic: [ ] easy bruising, [ ] bleeding gums    [  ] All remaining systems negative except as per above.   [ x] Unable to obtain    Vital Signs Last 24 Hrs  T(C): 36.9 (31 Mar 2018 07:43), Max: 37.4 (30 Mar 2018 11:26)  T(F): 98.4 (31 Mar 2018 07:43), Max: 99.4 (30 Mar 2018 11:26)  HR: 80 (31 Mar 2018 07:43) (72 - 80)  BP: 116/58 (31 Mar 2018 07:43) (85/39 - 116/58)  BP(mean): --  RR: 16 (31 Mar 2018 07:43) (16 - 16)  SpO2: 100% (31 Mar 2018 07:43) (100% - 100%)  I&O's Summary    30 Mar 2018 07:01  -  31 Mar 2018 07:00  --------------------------------------------------------  IN: 0 mL / OUT: 375 mL / NET: -375 mL    PHYSICAL EXAM:  General: Appears comfortable  Neck: Supple, No JVD  Lungs: Clear to auscultation bilaterally; No rales or wheezing  Heart: Regular rate and rhythm; No murmurs, rubs, or gallops  Abdomen: Nontender, bowel sounds present  Extremities: No clubbing, cyanosis, or edema    LABS:    Creatinine Trend: 0.68<--, 0.64<--, 0.64<--, 0.64<--, 0.78<--, 0.86<-- PRESENTING CC: shortness of breath    SUBJ:     In summary, this is a 83 year old female with hospice diagnosis of advanced dementia with poor appetite and weight loss recently admitted from the NH to [facility] with sepsis.  The patient was placed on IV pressors and IV antibiotics.  Her condition improved somewhat but overall she did not return to baseline.  The patient was noted to have increasing pain.  The surrogate elected for hospice and the patient was admitted to the IPU at [facility] for IV management of pain.    Overnight, there were no acute events. Patient received 2x PRNs of Dilaudid    Symptoms:  Pain (1-10): 0  Dyspnea: 0  Nausea/Vomitin  Secretions: 0  Agitation: 0  Symptom Requiring Inpatient Hospice Admission: dyspnea, pain    CODE STATUS: DNR    Allergies    penicillin (Rash)  sulfa drugs (Unknown)    Intolerances        MEDICATIONS  (STANDING):  clindamycin IVPB 900 milliGRAM(s) IV Intermittent every 8 hours  fluconAZOLE IVPB 200 milliGRAM(s) IV Intermittent every 24 hours    MEDICATIONS  (PRN):  acetaminophen  Suppository 650 milliGRAM(s) Rectal four times a day PRN For Temp greater than 38 C (100.4 F)  glycopyrrolate Injectable 0.2 milliGRAM(s) IV Push every 6 hours PRN secretions  HYDROmorphone  Injectable 0.5 milliGRAM(s) IV Push every 1 hour PRN pain and/or dyspnea  LORazepam   Injectable 0.5 milliGRAM(s) IV Push every 4 hours PRN Agitation  sodium biphosphate Rectal Enema 1 Enema Rectal every 24 hours PRN constipation      FAMILY HISTORY:    No family history of premature coronary artery disease or sudden cardiac death    SOCIAL HISTORY:    Smoking-denies    REVIEW OF SYSTEMS:  Constitutional: [ ] fever, [ ]weight loss,  [ ]fatigue  Eyes: [ ] visual changes  Respiratory: [ ]shortness of breath;  [ ] cough, [ ]wheezing, [ ]chills, [ ]hemoptysis  Cardiovascular: [ ] chest pain, [ ]palpitations, [ ]dizziness,  [ ]leg swelling  Gastrointestinal: [ ] abdominal pain, [ ]nausea, [ ]vomiting,  [ ]diarrhea   Genitourinary: [ ] dysuria, [ ] hematuria  Neurologic: [ ] headaches [ ] tremors [ ] weakness [ ] lightheadedness  Skin: [ ] itching, [ ]burning, [ ] rashes  Endocrine: [ ] heat or cold intolerance  Musculoskeletal: [ ] joint pain or swelling; [ ] muscle, back, or extremity pain  Psychiatric: [ ] depression, [ ]anxiety, [ ]mood swings, or [ ]difficulty sleeping  Hematologic: [ ] easy bruising, [ ] bleeding gums    [  ] All remaining systems negative except as per above.   [ x] Unable to obtain    Vital Signs Last 24 Hrs  T(C): 36.9 (31 Mar 2018 07:43), Max: 37.4 (30 Mar 2018 11:26)  T(F): 98.4 (31 Mar 2018 07:43), Max: 99.4 (30 Mar 2018 11:26)  HR: 80 (31 Mar 2018 07:43) (72 - 80)  BP: 116/58 (31 Mar 2018 07:43) (85/39 - 116/58)  BP(mean): --  RR: 16 (31 Mar 2018 07:43) (16 - 16)  SpO2: 100% (31 Mar 2018 07:43) (100% - 100%)  I&O's Summary    30 Mar 2018 07:01  -  31 Mar 2018 07:00  --------------------------------------------------------  IN: 0 mL / OUT: 375 mL / NET: -375 mL    PHYSICAL EXAM:  General: Appears comfortable, sleeping  Neck: Supple, No JVD  Lungs: Clear to auscultation bilaterally; No rales or wheezing  Heart: Regular rate and rhythm; No murmurs, rubs, or gallops  Abdomen: Nontender, bowel sounds present  Extremities: No clubbing, cyanosis, or edema    LABS:    Creatinine Trend: 0.68<--, 0.64<--, 0.64<--, 0.64<--, 0.78<--, 0.86<--

## 2018-04-01 RX ADMIN — HYDROMORPHONE HYDROCHLORIDE 0.5 MILLIGRAM(S): 2 INJECTION INTRAMUSCULAR; INTRAVENOUS; SUBCUTANEOUS at 05:45

## 2018-04-01 RX ADMIN — Medication 100 MILLIGRAM(S): at 05:33

## 2018-04-01 RX ADMIN — Medication 100 MILLIGRAM(S): at 22:48

## 2018-04-01 RX ADMIN — HYDROMORPHONE HYDROCHLORIDE 0.5 MILLIGRAM(S): 2 INJECTION INTRAMUSCULAR; INTRAVENOUS; SUBCUTANEOUS at 05:33

## 2018-04-01 RX ADMIN — Medication 100 MILLIGRAM(S): at 14:38

## 2018-04-01 RX ADMIN — FLUCONAZOLE 100 MILLIGRAM(S): 150 TABLET ORAL at 05:33

## 2018-04-02 RX ORDER — HYDROMORPHONE HYDROCHLORIDE 2 MG/ML
2 INJECTION INTRAMUSCULAR; INTRAVENOUS; SUBCUTANEOUS EVERY 4 HOURS
Qty: 0 | Refills: 0 | Status: DISCONTINUED | OUTPATIENT
Start: 2018-04-02 | End: 2018-04-07

## 2018-04-02 RX ADMIN — Medication 0.5 MILLIGRAM(S): at 06:16

## 2018-04-02 RX ADMIN — FLUCONAZOLE 100 MILLIGRAM(S): 150 TABLET ORAL at 06:16

## 2018-04-02 RX ADMIN — Medication 100 MILLIGRAM(S): at 06:05

## 2018-04-02 RX ADMIN — Medication 100 MILLIGRAM(S): at 13:00

## 2018-04-02 RX ADMIN — Medication 100 MILLIGRAM(S): at 23:05

## 2018-04-02 NOTE — PROGRESS NOTE ADULT - SUBJECTIVE AND OBJECTIVE BOX
DECLAN CROCKER                    83y  Female    Allergies    penicillin (Rash)  sulfa drugs (Unknown)    Intolerances        Symptoms:  Pain (1-10): 0  Dyspnea: 0  Nausea/Vomitin  Secretions: 0  Agitation: 0  Symptom Requiring Inpatient Hospice Admission: dyspnea and pain    Overnight events/interim history: ativan given times one for agitation with improvement    HPI:  83 year old female with hospice diagnosis of advanced dementia with poor appetite and weight loss recently admitted from the NH to [facility] with sepsis.  The patient was placed on IV pressors and IV antibiotics.  Her condition improved somewhat but overall she did not return to baseline.  The patient was noted to have increasing pain.  The surrogate elected for hospice and the patient was admitted to the IPU at [facility] for IV management of pain. (20 Mar 2018 22:20)          PPSV2: 10%    Code Status: DNR          MEDICATIONS  (STANDING):  clindamycin IVPB 900 milliGRAM(s) IV Intermittent every 8 hours  fluconAZOLE IVPB 200 milliGRAM(s) IV Intermittent every 24 hours    MEDICATIONS  (PRN):  acetaminophen  Suppository 650 milliGRAM(s) Rectal four times a day PRN For Temp greater than 38 C (100.4 F)  glycopyrrolate Injectable 0.2 milliGRAM(s) IV Push every 6 hours PRN secretions  HYDROmorphone  Injectable 0.5 milliGRAM(s) IV Push every 1 hour PRN dyspnea and/or pain  LORazepam   Injectable 0.5 milliGRAM(s) IV Push every 4 hours PRN Agitation  sodium biphosphate Rectal Enema 1 Enema Rectal every 24 hours PRN constipation                               PHYSICAL EXAM:  cachectic female in no respiratory distress  80/70 98.8 16 88 95%  HEENT: severe bitemporal wasting  Neck: no JVD no nodes no thyromegaly  Lungs: decreased breath sounds and rales at bases  Heart: s1s2 no audible murmurs  Abd: soft ND NT no masses no organomegaly  Ext: no edema, no redness

## 2018-04-03 NOTE — PROGRESS NOTE ADULT - SUBJECTIVE AND OBJECTIVE BOX
DECLAN CROCKER                    83y  Female    Allergies    penicillin (Rash)  sulfa drugs (Unknown)    Intolerances        Symptoms:  Pain (1-10): 0  Dyspnea: 0  Nausea/Vomitin  Secretions: 0  Agitation: 0  Symptom Requiring Inpatient Hospice Admission: dyspnea and pain    Overnight events/interim history: none    HPI:  83 year old female with hospice diagnosis of advanced dementia with poor appetite and weight loss recently admitted from the NH to [facility] with sepsis.  The patient was placed on IV pressors and IV antibiotics.  Her condition improved somewhat but overall she did not return to baseline.  The patient was noted to have increasing pain.  The surrogate elected for hospice and the patient was admitted to the IPU at [facility] for IV management of pain. (20 Mar 2018 22:20)          PPSV2: 10%    Code Status: DNR          MEDICATIONS  (STANDING):  clindamycin IVPB 900 milliGRAM(s) IV Intermittent every 8 hours  fluconAZOLE IVPB 200 milliGRAM(s) IV Intermittent every 24 hours    MEDICATIONS  (PRN):  acetaminophen  Suppository 650 milliGRAM(s) Rectal four times a day PRN For Temp greater than 38 C (100.4 F)  glycopyrrolate Injectable 0.2 milliGRAM(s) IV Push every 6 hours PRN secretions  HYDROmorphone  Injectable 0.5 milliGRAM(s) IV Push every 1 hour PRN dyspnea and/or pain  LORazepam   Injectable 0.5 milliGRAM(s) IV Push every 4 hours PRN Agitation  sodium biphosphate Rectal Enema 1 Enema Rectal every 24 hours PRN constipation                               PHYSICAL EXAM:  cachectic female in no respiratory distress  109/22 88 99,1 18 95%  HEENT: severe bitemporal wasting  Neck: no JVD no nodes no thyromegaly  Lungs: decreased breath sounds and rales at bases  Heart: s1s2 no audible murmurs  Abd: soft ND NT no masses no organomegaly  Ext: no edema, no redness

## 2018-04-04 RX ADMIN — HYDROMORPHONE HYDROCHLORIDE 2 MILLIGRAM(S): 2 INJECTION INTRAMUSCULAR; INTRAVENOUS; SUBCUTANEOUS at 04:27

## 2018-04-04 RX ADMIN — HYDROMORPHONE HYDROCHLORIDE 2 MILLIGRAM(S): 2 INJECTION INTRAMUSCULAR; INTRAVENOUS; SUBCUTANEOUS at 22:16

## 2018-04-04 RX ADMIN — HYDROMORPHONE HYDROCHLORIDE 2 MILLIGRAM(S): 2 INJECTION INTRAMUSCULAR; INTRAVENOUS; SUBCUTANEOUS at 22:04

## 2018-04-04 NOTE — PROGRESS NOTE ADULT - SUBJECTIVE AND OBJECTIVE BOX
DECLAN CROCKER                    83y  Female    Allergies    penicillin (Rash)  sulfa drugs (Unknown)    Intolerances        Symptoms:  Pain (1-10): 0  Dyspnea: 0  Nausea/Vomitin  Secretions: 0  Agitation: 0  Symptom Requiring Inpatient Hospice Admission: dyspnea and pain    Overnight events/interim history: dilaudid given times 2 for pain with improvement    HPI:  83 year old female with hospice diagnosis of advanced dementia with poor appetite and weight loss recently admitted from the NH to [facility] with sepsis.  The patient was placed on IV pressors and IV antibiotics.  Her condition improved somewhat but overall she did not return to baseline.  The patient was noted to have increasing pain.  The surrogate elected for hospice and the patient was admitted to the IPU at [facility] for IV management of pain. (20 Mar 2018 22:20)          PPSV2: 10%    Code Status: DNR          MEDICATIONS  (STANDING):  clindamycin IVPB 900 milliGRAM(s) IV Intermittent every 8 hours  fluconAZOLE IVPB 200 milliGRAM(s) IV Intermittent every 24 hours    MEDICATIONS  (PRN):  acetaminophen  Suppository 650 milliGRAM(s) Rectal four times a day PRN For Temp greater than 38 C (100.4 F)  glycopyrrolate Injectable 0.2 milliGRAM(s) IV Push every 6 hours PRN secretions  HYDROmorphone  Injectable 0.5 milliGRAM(s) IV Push every 1 hour PRN dyspnea and/or pain  LORazepam   Injectable 0.5 milliGRAM(s) IV Push every 4 hours PRN Agitation  sodium biphosphate Rectal Enema 1 Enema Rectal every 24 hours PRN constipation                               PHYSICAL EXAM:  cachectic female in no respiratory distress  131/70 87 98.8 16 100%  HEENT: severe bitemporal wasting  Neck: no JVD no nodes no thyromegaly  Lungs: decreased breath sounds and rales at bases  Heart: s1s2 no audible murmurs  Abd: soft ND NT no masses no organomegaly  Ext: no edema, no redness

## 2018-04-05 PROCEDURE — 99231 SBSQ HOSP IP/OBS SF/LOW 25: CPT

## 2018-04-05 NOTE — PROGRESS NOTE ADULT - SUBJECTIVE AND OBJECTIVE BOX
PRESENTING CC: shortness of breath    SUBJ:     In summary, this is a 83 year old female with hospice diagnosis of advanced dementia with poor appetite and weight loss recently admitted from the NH to [facility] with sepsis.  The patient was placed on IV pressors and IV antibiotics.  Her condition improved somewhat but overall she did not return to baseline.  The patient was noted to have increasing pain.  The surrogate elected for hospice and the patient was admitted to the IPU at [facility] for IV management of pain.    Overnight, there were no acute events.    Symptoms:  Pain (1-10): 0  Dyspnea: 0  Nausea/Vomitin  Secretions: 0  Agitation: 0  Symptom Requiring Inpatient Hospice Admission: dyspnea, pain    CODE STATUS: DNR    Allergies    penicillin (Rash)  sulfa drugs (Unknown)    Intolerances    MEDICATIONS  (STANDING):    MEDICATIONS  (PRN):  acetaminophen  Suppository 650 milliGRAM(s) Rectal four times a day PRN For Temp greater than 38 C (100.4 F)  glycopyrrolate Injectable 0.2 milliGRAM(s) IV Push every 6 hours PRN secretions  HYDROmorphone   Tablet 2 milliGRAM(s) Oral every 4 hours PRN pain  LORazepam    Concentrate 0.5 milliGRAM(s) SubLingual every 4 hours PRN Agitation  sodium biphosphate Rectal Enema 1 Enema Rectal every 24 hours PRN constipation      FAMILY HISTORY:  No changes    SOCIAL HISTORY:    No changes    REVIEW OF SYSTEMS:  Constitutional: [ ] fever, [ ]weight loss,  [ ]fatigue  Eyes: [ ] visual changes  Respiratory: [ ]shortness of breath;  [ ] cough, [ ]wheezing, [ ]chills, [ ]hemoptysis  Cardiovascular: [ ] chest pain, [ ]palpitations, [ ]dizziness,  [ ]leg swelling  Gastrointestinal: [ ] abdominal pain, [ ]nausea, [ ]vomiting,  [ ]diarrhea   Genitourinary: [ ] dysuria, [ ] hematuria  Neurologic: [ ] headaches [ ] tremors [ ] weakness [ ] lightheadedness  Skin: [ ] itching, [ ]burning, [ ] rashes  Endocrine: [ ] heat or cold intolerance  Musculoskeletal: [ ] joint pain or swelling; [ ] muscle, back, or extremity pain  Psychiatric: [ ] depression, [ ]anxiety, [ ]mood swings, or [ ]difficulty sleeping  Hematologic: [ ] easy bruising, [ ] bleeding gums    [  ] All remaining systems negative except as per above.   [ x] Unable to obtain    Vital Signs Last 24 Hrs  T(C): 37.3 (2018 08:15), Max: 37.4 (2018 23:57)  T(F): 99.1 (2018 08:15), Max: 99.3 (2018 23:57)  HR: 86 (2018 08:15) (82 - 106)  BP: 114/89 (2018 08:15) (104/73 - 114/89)  BP(mean): --  RR: 16 (2018 08:15) (16 - 16)  SpO2: 84% (2018 08:15) (84% - 100%)  I&O's Summary    2018 07:01  -  2018 07:00  --------------------------------------------------------  IN: 0 mL / OUT: 125 mL / NET: -125 mL    PHYSICAL EXAM:  General: Appears comfortable  Neck: Supple, No JVD  Lungs: Clear to auscultation bilaterally; No rales or wheezing  Heart: Regular rate and rhythm; No murmurs, rubs, or gallops  Abdomen: Nontender, bowel sounds present  Extremities: No clubbing, cyanosis, or edema    LABS:    Creatinine Trend: 0.68<--, 0.64<--, 0.64<--, 0.64<--, 0.78<--, 0.86<-- PRESENTING CC: shortness of breath    SUBJ:     In summary, this is a 83 year old female with hospice diagnosis of advanced dementia with poor appetite and weight loss recently admitted from the NH to [facility] with sepsis.  The patient was placed on IV pressors and IV antibiotics.  Her condition improved somewhat but overall she did not return to baseline.  The patient was noted to have increasing pain.  The surrogate elected for hospice and the patient was admitted to the IPU at [facility] for IV management of pain.    Overnight, there were no acute events. Patient received 1 x PRN of dilaudid.     Symptoms:  Pain (1-10): 0  Dyspnea: 0  Nausea/Vomitin  Secretions: 0  Agitation: 0  Symptom Requiring Inpatient Hospice Admission: dyspnea, pain    CODE STATUS: DNR    Allergies    penicillin (Rash)  sulfa drugs (Unknown)    Intolerances    MEDICATIONS  (STANDING):    MEDICATIONS  (PRN):  acetaminophen  Suppository 650 milliGRAM(s) Rectal four times a day PRN For Temp greater than 38 C (100.4 F)  glycopyrrolate Injectable 0.2 milliGRAM(s) IV Push every 6 hours PRN secretions  HYDROmorphone   Tablet 2 milliGRAM(s) Oral every 4 hours PRN pain  LORazepam    Concentrate 0.5 milliGRAM(s) SubLingual every 4 hours PRN Agitation  sodium biphosphate Rectal Enema 1 Enema Rectal every 24 hours PRN constipation      FAMILY HISTORY:  No changes    SOCIAL HISTORY:    No changes    REVIEW OF SYSTEMS:  Constitutional: [ ] fever, [ ]weight loss,  [ ]fatigue  Eyes: [ ] visual changes  Respiratory: [ ]shortness of breath;  [ ] cough, [ ]wheezing, [ ]chills, [ ]hemoptysis  Cardiovascular: [ ] chest pain, [ ]palpitations, [ ]dizziness,  [ ]leg swelling  Gastrointestinal: [ ] abdominal pain, [ ]nausea, [ ]vomiting,  [ ]diarrhea   Genitourinary: [ ] dysuria, [ ] hematuria  Neurologic: [ ] headaches [ ] tremors [ ] weakness [ ] lightheadedness  Skin: [ ] itching, [ ]burning, [ ] rashes  Endocrine: [ ] heat or cold intolerance  Musculoskeletal: [ ] joint pain or swelling; [ ] muscle, back, or extremity pain  Psychiatric: [ ] depression, [ ]anxiety, [ ]mood swings, or [ ]difficulty sleeping  Hematologic: [ ] easy bruising, [ ] bleeding gums    [  ] All remaining systems negative except as per above.   [ x] Unable to obtain    Vital Signs Last 24 Hrs  T(C): 37.3 (2018 08:15), Max: 37.4 (2018 23:57)  T(F): 99.1 (2018 08:15), Max: 99.3 (2018 23:57)  HR: 86 (2018 08:15) (82 - 106)  BP: 114/89 (2018 08:15) (104/73 - 114/89)  BP(mean): --  RR: 16 (2018 08:15) (16 - 16)  SpO2: 84% (2018 08:15) (84% - 100%)  I&O's Summary    2018 07:01  -  2018 07:00  --------------------------------------------------------  IN: 0 mL / OUT: 125 mL / NET: -125 mL    PHYSICAL EXAM:  General: Appears comfortable  Neck: Supple, No JVD  Lungs: Clear to auscultation bilaterally; No rales or wheezing  Heart: Regular rate and rhythm; No murmurs, rubs, or gallops  Abdomen: Nontender, bowel sounds present  Extremities: No clubbing, cyanosis, or edema    LABS:    Creatinine Trend: 0.68<--, 0.64<--, 0.64<--, 0.64<--, 0.78<--, 0.86<--

## 2018-04-06 PROCEDURE — 99231 SBSQ HOSP IP/OBS SF/LOW 25: CPT

## 2018-04-06 RX ORDER — HYDROMORPHONE HYDROCHLORIDE 2 MG/ML
0.5 INJECTION INTRAMUSCULAR; INTRAVENOUS; SUBCUTANEOUS
Qty: 0 | Refills: 0 | Status: DISCONTINUED | OUTPATIENT
Start: 2018-04-06 | End: 2018-04-07

## 2018-04-06 RX ADMIN — HYDROMORPHONE HYDROCHLORIDE 2 MILLIGRAM(S): 2 INJECTION INTRAMUSCULAR; INTRAVENOUS; SUBCUTANEOUS at 07:30

## 2018-04-06 RX ADMIN — HYDROMORPHONE HYDROCHLORIDE 2 MILLIGRAM(S): 2 INJECTION INTRAMUSCULAR; INTRAVENOUS; SUBCUTANEOUS at 06:58

## 2018-04-06 NOTE — PROGRESS NOTE ADULT - SUBJECTIVE AND OBJECTIVE BOX
PRESENTING CC: shortness of breath    SUBJ:     In summary, this is a 83 year old female with hospice diagnosis of advanced dementia with poor appetite and weight loss recently admitted from the NH to [facility] with sepsis.  The patient was placed on IV pressors and IV antibiotics.  Her condition improved somewhat but overall she did not return to baseline.  The patient was noted to have increasing pain.  The surrogate elected for hospice and the patient was admitted to the IPU at [facility] for IV management of pain.    Overnight, there were no acute events. Patient sometimes does not want to take PO; while she still has venous access will add PRN IV meds as well.     Symptoms:  Pain (1-10): 0  Dyspnea: 0  Nausea/Vomitin  Secretions: 0  Agitation: 0  Symptom Requiring Inpatient Hospice Admission: dyspnea, pain    CODE STATUS: DNR    Allergies    penicillin (Rash)  sulfa drugs (Unknown)    Intolerances        MEDICATIONS  (STANDING):    MEDICATIONS  (PRN):  acetaminophen  Suppository 650 milliGRAM(s) Rectal four times a day PRN For Temp greater than 38 C (100.4 F)  glycopyrrolate Injectable 0.2 milliGRAM(s) IV Push every 6 hours PRN secretions  HYDROmorphone   Tablet 2 milliGRAM(s) Oral every 4 hours PRN pain  HYDROmorphone  Injectable 0.5 milliGRAM(s) IV Push every 1 hour PRN dyspnea and/or pain  LORazepam    Concentrate 0.5 milliGRAM(s) SubLingual every 4 hours PRN Agitation  LORazepam   Injectable 0.5 milliGRAM(s) IV Push every 4 hours PRN Agitation  sodium biphosphate Rectal Enema 1 Enema Rectal every 24 hours PRN constipation      FAMILY HISTORY:  No changes    SOCIAL HISTORY:    No changes    REVIEW OF SYSTEMS:  Constitutional: [ ] fever, [ ]weight loss,  [ ]fatigue  Eyes: [ ] visual changes  Respiratory: [ ]shortness of breath;  [ ] cough, [ ]wheezing, [ ]chills, [ ]hemoptysis  Cardiovascular: [ ] chest pain, [ ]palpitations, [ ]dizziness,  [ ]leg swelling  Gastrointestinal: [ ] abdominal pain, [ ]nausea, [ ]vomiting,  [ ]diarrhea   Genitourinary: [ ] dysuria, [ ] hematuria  Neurologic: [ ] headaches [ ] tremors [ ] weakness [ ] lightheadedness  Skin: [ ] itching, [ ]burning, [ ] rashes  Endocrine: [ ] heat or cold intolerance  Musculoskeletal: [ ] joint pain or swelling; [ ] muscle, back, or extremity pain  Psychiatric: [ ] depression, [ ]anxiety, [ ]mood swings, or [ ]difficulty sleeping  Hematologic: [ ] easy bruising, [ ] bleeding gums    [  ] All remaining systems negative except as per above.   [ x] Unable to obtain    Vital Signs Last 24 Hrs  T(C): 36.8 (2018 07:28), Max: 37.6 (2018 23:49)  T(F): 98.3 (2018 07:28), Max: 99.6 (2018 23:49)  HR: 103 (2018 07:) (86 - 103)  BP: 122/72 (2018 07:) (90/55 - 122/72)  BP(mean): --  RR: 16 (2018 07:28) (16 - 16)  SpO2: 73% (2018 07:28) (73% - 100%)  I&O's Summary    2018 07:01  -  2018 07:00  --------------------------------------------------------  IN: 0 mL / OUT: 300 mL / NET: -300 mL        PHYSICAL EXAM:  General: Appears comfortable  Neck: Supple, No JVD  Lungs: Clear to auscultation bilaterally; No rales or wheezing  Heart: Regular rate and rhythm; No murmurs, rubs, or gallops  Abdomen: Nontender, bowel sounds present  Extremities: No clubbing, cyanosis, or edema    LABS:    Creatinine Trend: 0.68<--, 0.64<--, 0.64<--, 0.64<--, 0.78<--, 0.86<--

## 2018-04-06 NOTE — PROGRESS NOTE ADULT - PROBLEM SELECTOR PLAN 1
dilaudid 2 mg PO every 30 minutes as needed; added IV PRN to be used as second line agent if patient refusing PO, while she still has IV access as inpatient

## 2018-04-07 RX ORDER — HYDROMORPHONE HYDROCHLORIDE 2 MG/ML
0.5 INJECTION INTRAMUSCULAR; INTRAVENOUS; SUBCUTANEOUS
Qty: 0 | Refills: 0 | Status: DISCONTINUED | OUTPATIENT
Start: 2018-04-07 | End: 2018-04-10

## 2018-04-07 RX ORDER — HYDROMORPHONE HYDROCHLORIDE 2 MG/ML
2 INJECTION INTRAMUSCULAR; INTRAVENOUS; SUBCUTANEOUS
Qty: 0 | Refills: 0 | Status: DISCONTINUED | OUTPATIENT
Start: 2018-04-07 | End: 2018-04-10

## 2018-04-07 RX ADMIN — HYDROMORPHONE HYDROCHLORIDE 0.5 MILLIGRAM(S): 2 INJECTION INTRAMUSCULAR; INTRAVENOUS; SUBCUTANEOUS at 11:38

## 2018-04-07 RX ADMIN — HYDROMORPHONE HYDROCHLORIDE 0.5 MILLIGRAM(S): 2 INJECTION INTRAMUSCULAR; INTRAVENOUS; SUBCUTANEOUS at 11:55

## 2018-04-07 NOTE — PROGRESS NOTE ADULT - SUBJECTIVE AND OBJECTIVE BOX
DECLAN CROCKER                    83y  Female    Allergies    penicillin (Rash)  sulfa drugs (Unknown)    Intolerances        Symptoms:  Pain (1-10):  Dyspnea:  Nausea/Vomiting:  Secretions:   Agitation:  Symptom Requiring Inpatient Hospice Admission:    Overnight events/interim history:    HPI:  83 year old female with hospice diagnosis of advanced dementia with poor appetite and weight loss recently admitted from the NH to [facility] with sepsis.  The patient was placed on IV pressors and IV antibiotics.  Her condition improved somewhat but overall she did not return to baseline.  The patient was noted to have increasing pain.  The surrogate elected for hospice and the patient was admitted to the IPU at [facility] for IV management of pain. (20 Mar 2018 22:20)          PPSV2:     Code Status:          MEDICATIONS  (STANDING):    MEDICATIONS  (PRN):  acetaminophen  Suppository 650 milliGRAM(s) Rectal four times a day PRN For Temp greater than 38 C (100.4 F)  glycopyrrolate Injectable 0.2 milliGRAM(s) IV Push every 6 hours PRN secretions  HYDROmorphone   Tablet 2 milliGRAM(s) Oral every 4 hours PRN pain  HYDROmorphone  Injectable 0.5 milliGRAM(s) IV Push every 1 hour PRN dyspnea and/or pain  LORazepam    Concentrate 0.5 milliGRAM(s) SubLingual every 4 hours PRN Agitation  LORazepam   Injectable 0.5 milliGRAM(s) IV Push every 4 hours PRN Agitation  sodium biphosphate Rectal Enema 1 Enema Rectal every 24 hours PRN constipation                             Vital Signs Last 24 Hrs  T(C): 36.8 (07 Apr 2018 07:09), Max: 37.2 (06 Apr 2018 15:36)  T(F): 98.3 (07 Apr 2018 07:09), Max: 99 (06 Apr 2018 15:36)  HR: 86 (07 Apr 2018 07:09) (86 - 96)  BP: 103/71 (07 Apr 2018 07:09) (103/71 - 132/61)  BP(mean): --  RR: 16 (07 Apr 2018 07:09) (16 - 16)  SpO2: 97% (07 Apr 2018 07:09) (91% - 100%)    PHYSICAL EXAM: DECLAN CROCKER                    83y  Female    Allergies    penicillin (Rash)  sulfa drugs (Unknown)    Intolerances    Symptoms:  Pain (1-10): none this morning or overnight  Dyspnea: none  Nausea/Vomiting: none  Secretions: none  Agitation: none  Symptom Requiring Inpatient Hospice Admission: occ pain due to severe wounds    Overnight events/interim history: one PRN overnight of dilaudid, occ po intake of diet, this morning ate a good amount    HPI:  83 year old female with hospice diagnosis of advanced dementia with poor appetite and weight loss recently admitted from the NH to [facility] with sepsis.  The patient was placed on IV pressors and IV antibiotics.  Her condition improved somewhat but overall she did not return to baseline.  The patient was noted to have increasing pain.  The surrogate elected for hospice and the patient was admitted to the IPU at [facility] for IV management of pain. (20 Mar 2018 22:20)          PPSV2: 30    Code Status: DNR/DNI          MEDICATIONS  (STANDING):    MEDICATIONS  (PRN):  acetaminophen  Suppository 650 milliGRAM(s) Rectal four times a day PRN For Temp greater than 38 C (100.4 F)  glycopyrrolate Injectable 0.2 milliGRAM(s) IV Push every 6 hours PRN secretions  HYDROmorphone   Tablet 2 milliGRAM(s) Oral every 4 hours PRN pain  HYDROmorphone  Injectable 0.5 milliGRAM(s) IV Push every 1 hour PRN dyspnea and/or pain  LORazepam    Concentrate 0.5 milliGRAM(s) SubLingual every 4 hours PRN Agitation  LORazepam   Injectable 0.5 milliGRAM(s) IV Push every 4 hours PRN Agitation  sodium biphosphate Rectal Enema 1 Enema Rectal every 24 hours PRN constipation                             Vital Signs Last 24 Hrs  T(C): 36.8 (07 Apr 2018 07:09), Max: 37.2 (06 Apr 2018 15:36)  T(F): 98.3 (07 Apr 2018 07:09), Max: 99 (06 Apr 2018 15:36)  HR: 86 (07 Apr 2018 07:09) (86 - 96)  BP: 103/71 (07 Apr 2018 07:09) (103/71 - 132/61)  BP(mean): --  RR: 16 (07 Apr 2018 07:09) (16 - 16)  SpO2: 97% (07 Apr 2018 07:09) (91% - 100%)    PHYSICAL EXAM:  arousable but mostly lethargic, no teeth  able to take swallow and drink pureed and thickened liquids 1-2 meals a day now  tender significant draining sacral wounds  bedbound, weakness x4, mostly contracted x4  pompa cath - GI/ incontinence  no resp distress

## 2018-04-07 NOTE — PROGRESS NOTE ADULT - PROBLEM SELECTOR PLAN 1
dilaudid 2 mg PO every 30 minutes as needed; added IV PRN to be used as second line agent if patient refusing PO, while she still has IV access as inpatient IV PRN to be used as second line agent if patient refusing PO, while she still has IV access as inpatient

## 2018-04-08 RX ADMIN — HYDROMORPHONE HYDROCHLORIDE 0.5 MILLIGRAM(S): 2 INJECTION INTRAMUSCULAR; INTRAVENOUS; SUBCUTANEOUS at 13:12

## 2018-04-08 RX ADMIN — HYDROMORPHONE HYDROCHLORIDE 0.5 MILLIGRAM(S): 2 INJECTION INTRAMUSCULAR; INTRAVENOUS; SUBCUTANEOUS at 13:28

## 2018-04-09 RX ADMIN — Medication 0.5 MILLIGRAM(S): at 20:20

## 2018-04-09 NOTE — PROGRESS NOTE ADULT - PROBLEM SELECTOR PLAN 5
continue inpatient hospice
change level of care to routine
admit to inpatient hospice
continue inpatient hospice
continue inpatient hospice  discharge planning for home
admit to inpatient hospice
admit to inpatient hospice
change level of care to routine
continue inpatient hospice
continue inpatient hospice  discharge planning for home
continue inpatient hospice  discharge planning for home
continue inpatient hospice  discharge planning for home vs. facility
continue inpatient hospice  discharge planning for home vs. facility
continue inpatient hospice

## 2018-04-09 NOTE — PROGRESS NOTE ADULT - PROBLEM SELECTOR PROBLEM 4
Delirium
Vascular dementia without behavioral disturbance
Delirium
Vascular dementia without behavioral disturbance
Delirium
Delirium

## 2018-04-09 NOTE — PROGRESS NOTE ADULT - PROVIDER SPECIALTY LIST ADULT
Hospice
Internal Medicine
Hospice
Internal Medicine

## 2018-04-09 NOTE — PROGRESS NOTE ADULT - PROBLEM SELECTOR PROBLEM 5
Vascular dementia without behavioral disturbance

## 2018-04-09 NOTE — PROGRESS NOTE ADULT - PROBLEM SELECTOR PROBLEM 2
Dyspnea, unspecified type
Infected wound
Dyspnea, unspecified type
Infected wound
Dyspnea, unspecified type
Dyspnea, unspecified type

## 2018-04-09 NOTE — PROGRESS NOTE ADULT - PROBLEM SELECTOR PROBLEM 7
Skin abnormalities

## 2018-04-09 NOTE — PROGRESS NOTE ADULT - PROBLEM SELECTOR PLAN 2
dilaudid as above
c/w abx, c/w gentle wound care, supportive management only, no debridement
dilaudid as above
c/w abx, c/w gentle wound care, supportive management only, no debridement
dilaudid as above

## 2018-04-09 NOTE — PROGRESS NOTE ADULT - PROBLEM SELECTOR PROBLEM 6
Constipation due to opioid therapy

## 2018-04-09 NOTE — PROGRESS NOTE ADULT - SUBJECTIVE AND OBJECTIVE BOX
DECLAN CROCKER                    83y  Female    Allergies    penicillin (Rash)  sulfa drugs (Unknown)    Intolerances        Symptoms:  Pain (1-10): 0  Dyspnea: 0  Nausea/Vomitin  Secretions: 0  Agitation: 0  Symptom Requiring Inpatient Hospice Admission: dyspnea and pain    Overnight events/interim history: dilaudid given times 2 for pain with improvement    HPI:  83 year old female with hospice diagnosis of advanced dementia with poor appetite and weight loss recently admitted from the NH to [facility] with sepsis.  The patient was placed on IV pressors and IV antibiotics.  Her condition improved somewhat but overall she did not return to baseline.  The patient was noted to have increasing pain.  The surrogate elected for hospice and the patient was admitted to the IPU at [facility] for IV management of pain. (20 Mar 2018 22:20)          PPSV2: 10%    Code Status: DNR          MEDICATIONS  (STANDING):  clindamycin IVPB 900 milliGRAM(s) IV Intermittent every 8 hours  fluconAZOLE IVPB 200 milliGRAM(s) IV Intermittent every 24 hours    MEDICATIONS  (PRN):  acetaminophen  Suppository 650 milliGRAM(s) Rectal four times a day PRN For Temp greater than 38 C (100.4 F)  glycopyrrolate Injectable 0.2 milliGRAM(s) IV Push every 6 hours PRN secretions  HYDROmorphone  Injectable 0.5 milliGRAM(s) IV Push every 1 hour PRN dyspnea and/or pain  LORazepam   Injectable 0.5 milliGRAM(s) IV Push every 4 hours PRN Agitation  sodium biphosphate Rectal Enema 1 Enema Rectal every 24 hours PRN constipation                               PHYSICAL EXAM:  cachectic female in no respiratory distress  109/59 104 99.2 16 97%  HEENT: severe bitemporal wasting  Neck: no JVD no nodes no thyromegaly  Lungs: decreased breath sounds and rales at bases  Heart: s1s2 no audible murmurs  Abd: soft ND NT no masses no organomegaly  Ext: no edema, no redness

## 2018-04-09 NOTE — PROGRESS NOTE ADULT - ATTENDING COMMENTS
Patient is seen and examined. Case reviewed with the medical team. Above note is appreciated. Will follow up clinically. Continue DVT prophylaxis. Case discussed with hospice team. continue care as per hospice.
Patient is seen and examined.
Patient is seen and examined.
Patient is DNR.  Discussed with daughter by telephone
Patient is DNR.
Patient is DNR.  Discussed with family at bedside.
Patient is seen and examined. Case reviewed with the medical team. Above note is appreciated. Will follow up clinically. Continue DVT prophylaxis. Case discussed with Hospice team. Overall prognosis is very poor despite any medical intervention and the family is fully aware. Continue supportive care and comfort care.
Patient is DNR.
Patient is seen and examined.
Patient is seen and examined.  Discussed with family at bedside.
Patient is seen and examined.  Discussed with family at bedside.
Patient is seen and examined. Case reviewed with the medical team. Above note is appreciated. Will follow up clinically. Continue DVT prophylaxis. Case discussed with Hospice team. Overall prognosis is very poor despite any medical intervention and the family is fully aware. Continue supportive care and comfort care. case fully discussed with the hospice team.
Patient is seen and examined. Case reviewed with the medical team. Above note is appreciated. Will follow up clinically. Continue DVT prophylaxis. Case discussed with Hospice team. Overall prognosis is very poor despite any medical intervention and the family is fully aware. Continue supportive care.

## 2018-04-09 NOTE — PROGRESS NOTE ADULT - ASSESSMENT
83 year old female with hospice diagnosis of advanced dementia admitted to the IPU at [facility] for IV management of pain.
83 year old female with hospice diagnosis of advanced dementia admitted to the IPU at [facility] for IV management of pain.
83 year old female with hospice diagnosis of advanced dementia admitted to the IPU at [facility] for IV management of pain.
83 year old female with hospice diagnosis of advanced dementia admitted to the IPU at [facility] for IV management of pain.
83 year old female with hospice diagnosis of advanced dementia [facility].
83 year old female with hospice diagnosis of advanced dementia [facility].
83 year old female with hospice diagnosis of advanced dementia admitted to the IPU at [facility] for IV management of pain.
83 year old female with hospice diagnosis of advanced dementia admitted to the IPU at [facility] for IV management of pain.
83 year old female with hospice diagnosis of advanced dementia admitted to the IPU at [facility] for IV management of pain.
83 year old female with hospice diagnosis of advanced dementia admitted to the IPU at [facility] for IV management of pain.
83 year old female with hospice diagnosis of advanced dementia admitted to the IPU at [facility] for IV management of pain.
83 year old female with hospice diagnosis of advanced dementia admitted to the IPU at [facility] for IV management of pain.
83 year old female with hospice diagnosis of advanced dementia admitted to the IPU at [facility] for IV management of pain.
83 year old female with hospice diagnosis of advanced dementia admitted to the IPU at [facility] for IV management of pain.
83 year old female with hospice diagnosis of advanced dementia admitted to the IPU at [facility] for IV management of pain.
Patient is in hospice  care. Very poor prognosis despite any medical intervfention, DNR DNI. family fully aware.
Patient with failure to thrive and in hospice. Transferred to the medical floor. Overall prognosis is very poor despite any medical intervention and  the family is fully aware.
Patient with failure to thrive and in hospice. Transferred to the medical floor. Overall prognosis is very poor despite any medical intervention and  the family is fully aware.
Patient with no clini.jax changes. Spoke with daughter in the room in [facility]. Patient comfortable and will continue hospice care.
Patient with no clinical changes. Remains in hospice care . Over all prognosis is very poor despite any medical intervention and the family is fully aware.
83 year old female with hospice diagnosis of advanced dementia admitted to the IPU at [facility] for IV management of pain.
83 year old female with hospice diagnosis of advanced dementia admitted to the IPU at [facility] for IV management of pain.
83 year old female with hospice diagnosis of advanced dementia admitted to the IPU at [facility] for IV management of pain.

## 2018-04-09 NOTE — PROGRESS NOTE ADULT - PROBLEM SELECTOR PLAN 4
ativan 0.5 mg IVP every 4 hours as needed
ativan 0.5 mg SL every 4 hours as needed
artivan 0.5 mg IVP every 4 hours as needed
ativan 0.5 mg IVP every 4 hours as needed
ativan 0.5 mg SL every 4 hours as needed
endstage, eligible for inpt hospice for now - BP is stabilized, able to take some po meds, pt may be able to go home with hospice if pt continues to stabilize
artivan 0.5 mg IVP every 4 hours as needed
artivan 0.5 mg IVP every 4 hours as needed
ativan 0.5 mg IVP every 4 hours as needed
ativan 0.5 mg SL every 4 hours as needed
change ativan 0.5 mg SL every 4 hours as needed
endstage, eligible for inpt hospice for now - bp is stabilized, able to take some po meds, pt may be able to go home with hospice if pt continues to stabilize
ativan 0.5 mg IVP every 4 hours as needed

## 2018-04-09 NOTE — PROGRESS NOTE ADULT - PROBLEM SELECTOR PLAN 6
fleet enema daily as needed

## 2018-04-10 VITALS
SYSTOLIC BLOOD PRESSURE: 115 MMHG | DIASTOLIC BLOOD PRESSURE: 61 MMHG | HEART RATE: 92 BPM | RESPIRATION RATE: 16 BRPM | TEMPERATURE: 99 F | OXYGEN SATURATION: 100 %

## 2018-04-10 PROCEDURE — 99239 HOSP IP/OBS DSCHRG MGMT >30: CPT

## 2018-04-10 RX ADMIN — HYDROMORPHONE HYDROCHLORIDE 0.5 MILLIGRAM(S): 2 INJECTION INTRAMUSCULAR; INTRAVENOUS; SUBCUTANEOUS at 13:04

## 2018-04-24 DIAGNOSIS — Z51.5 ENCOUNTER FOR PALLIATIVE CARE: ICD-10-CM

## 2018-04-24 DIAGNOSIS — F01.50 VASCULAR DEMENTIA WITHOUT BEHAVIORAL DISTURBANCE: ICD-10-CM

## 2018-04-24 DIAGNOSIS — Z74.01 BED CONFINEMENT STATUS: ICD-10-CM

## 2018-04-24 DIAGNOSIS — Z88.2 ALLERGY STATUS TO SULFONAMIDES: ICD-10-CM

## 2018-04-24 DIAGNOSIS — Z66 DO NOT RESUSCITATE: ICD-10-CM

## 2018-04-24 DIAGNOSIS — Z88.0 ALLERGY STATUS TO PENICILLIN: ICD-10-CM

## 2018-04-24 DIAGNOSIS — L89.154 PRESSURE ULCER OF SACRAL REGION, STAGE 4: ICD-10-CM

## 2018-04-24 DIAGNOSIS — K59.03 DRUG INDUCED CONSTIPATION: ICD-10-CM

## 2018-04-24 DIAGNOSIS — F05 DELIRIUM DUE TO KNOWN PHYSIOLOGICAL CONDITION: ICD-10-CM

## 2018-04-24 DIAGNOSIS — T40.2X5A ADVERSE EFFECT OF OTHER OPIOIDS, INITIAL ENCOUNTER: ICD-10-CM

## 2018-08-07 ENCOUNTER — TRANSCRIPTION ENCOUNTER (OUTPATIENT)
Age: 83
End: 2018-08-07

## 2018-08-07 NOTE — DISCHARGE NOTE ADULT - PATIENT PORTAL LINK FT
You can access the [email or web address] Patient Portal, offered by [facility], by registering with the following website: [email or web address]

## 2018-08-07 NOTE — DISCHARGE NOTE ADULT - MEDICATION SUMMARY - MEDICATIONS TO STOP TAKING
I will STOP taking the medications listed below when I get home from the hospital:    clindamycin    meropenem 500 mg intravenous injection  -- 500 milligram(s) intravenous every 12 hours    fluconazole

## 2018-08-07 NOTE — DISCHARGE NOTE ADULT - PLAN OF CARE
supportive care s/p antibiotics, gentle wound care Dilaudid prn ativan prn c/w hospice and supportive care

## 2018-08-07 NOTE — DISCHARGE NOTE ADULT - CARE PROVIDER_API CALL
Mitch Coronado (DO), Medicine  [address] [place] [address]  [address]  [place] [address]  Phone: [phone]  Fax: [phone]

## 2018-08-07 NOTE — DISCHARGE NOTE ADULT - HOSPITAL COURSE
83 year old female with hospice diagnosis of advanced dementia with poor appetite and weight loss recently admitted from the NH to [facility] with sepsis due to significant infected wounds.  The patient was placed on IV pressors and IV antibiotics.  Her condition improved somewhat but overall she did not return to baseline and family did not want surgical debridement of wounds.  The patient was noted to have increasing pain.  The surrogate elected for inpatient hospice and the patient was admitted to the IPU at [facility] for IV management of pain, pt completed abx course and pt slowly improved some po intake. family initially considered going home with hospice but still needing acute wound care - family elected to discharge to [facility]

## 2018-08-07 NOTE — DISCHARGE NOTE ADULT - CARE PLAN
Principal Discharge DX:	Infected wound  Goal:	supportive care  Assessment and plan of treatment:	s/p antibiotics, gentle wound care  Secondary Diagnosis:	Pain  Assessment and plan of treatment:	Dilaudid prn  Secondary Diagnosis:	Delirium  Goal:	ativan prn  Secondary Diagnosis:	Vascular dementia without behavioral disturbance  Assessment and plan of treatment:	c/w hospice and supportive care

## 2019-10-29 NOTE — PHYSICAL THERAPY INITIAL EVALUATION ADULT - IMPAIRMENTS CONTRIBUTING IMPAIRED BED MOBILITY, REHAB EVAL
I have reviewed discharge instructions with the patient. The patient verbalized understanding. Patient left ED via Discharge Method: ambulatory to Home with self    Opportunity for questions and clarification provided. Patient given 2 scripts. To continue your aftercare when you leave the hospital, you may receive an automated call from our care team to check in on how you are doing. This is a free service and part of our promise to provide the best care and service to meet your aftercare needs.  If you have questions, or wish to unsubscribe from this service please call [phone]. Thank you for Choosing our [address] [place] [address] Emergency Department. impaired balance/decreased strength/decreased ROM/impaired postural control/cognition

## 2020-02-06 NOTE — PATIENT PROFILE ADULT. - FUNCTIONAL SCREEN CURRENT LEVEL: AMBULATION, MLM
Attempt #1  Called patient @ [phone] - Left a non-detailed message to call back and speak with any triage nurse.    Lexi Ramon RN  [facility]     (4) completely dependent

## 2020-05-28 NOTE — DIETITIAN INITIAL EVALUATION ADULT. - PROBLEM SELECTOR PLAN 1
c/w xarelto c/w xarelto improving  today Calcium level 7.7  monitor level replaced replaced replaced see CT A/P results  spoke with IR -- hole xarelto tonight and recheck INR in AM   poss IR guided tab tomorrow see CT A/P results  spoke with IR -- hole xarelto tonight and recheck INR in AM   poss IR guided tab tomorrow Patient is on Xarelto. see CT A/P results  - IR guided tab tomorrow  on xarelto which is on hold for IR GUIDED paracentasis  -inr 3.5 VIT K GIVEN see CT A/P results  - IR guided tab tomorrow  on xarelto which is on hold for IR GUIDED paracentasis  -inr 3.5 VIT K GIVEN hx of PE on xarelto  Xarelto held for Tap replaced Secondary to UTI and infected sacral ulcer   •Bp still borderline low after she received 4 L iv fluids   •Continue maintenance fluids   •Will start on meropenem [renally adjusted, allergic to pencillin ] to cover for uti and sacral wound . Dr Garcia consulted   •Needs surgery consult for possible debridement   •F/u repeat lactate and labs   •Will get ct abdomen to evaluate for intra-abdominal pathology   •follow blood & urine cultures

## 2021-01-14 NOTE — PATIENT PROFILE ADULT. - FALL HARM RISK TYPE OF ASSESSMENT
EXAM DESCRIPTION:

U/S ABDOMEN LTD W/DOPPLER

RadLex: US ABDOMEN DOPPLER LIMITED 



CLINICAL HISTORY:

6 years  Female;  eval umbilical hernia;



COMPARISON: None



FINDINGS:

Grayscale and Doppler ultrasound was performed with attention to

the anterior abdominal wall in the region of the umbilicus.



Umbilical hernia is identified, with width approximately 1.8 cm.

Bowel is visualized protruding through the defect both with and

without Valsalva maneuvers. Note that the proximal bowel cannot

be evaluated due to artifact. Cannot exclude partial obstruction.



IMPRESSION:

1.  Umbilical hernia
Admission

## 2021-08-23 NOTE — PROGRESS NOTE ADULT - PROBLEM SELECTOR PLAN 7
treatment as per protocol  discontinue IV Clinda and fluconazole Rotation Flap Text: The defect edges were debeveled with a #15 scalpel blade.  Given the location of the defect, shape of the defect and the proximity to free margins a rotation flap was deemed most appropriate.  Using a sterile surgical marker, an appropriate rotation flap was drawn incorporating the defect and placing the expected incisions within the relaxed skin tension lines where possible.    The area thus outlined was incised deep to adipose tissue with a #15 scalpel blade.  The skin margins were undermined to an appropriate distance in all directions utilizing iris scissors.

## 2021-12-09 NOTE — ED ADULT NURSE NOTE - ASSOCIATED SYMPTOMS
lethargic Quality 110: Preventive Care And Screening: Influenza Immunization: Influenza Immunization previously received during influenza season Quality 111:Pneumonia Vaccination Status For Older Adults: Pneumococcal Vaccination Previously Received Detail Level: Detailed

## 2022-04-07 NOTE — DIETITIAN INITIAL EVALUATION ADULT. - PROBLEM/PLAN-6
PRINCIPAL DISCHARGE DIAGNOSIS  Diagnosis: Abscess  Assessment and Plan of Treatment: -You were found to have a 8x6x5.5 cm abcess posterior to your liver likely due to a post-surgical infection.  -Please follow up with [facility] for further management      SECONDARY DISCHARGE DIAGNOSES  Diagnosis: Palpitations  Assessment and Plan of Treatment: -You were found to have PACs on ekg. Please continue with increased metoprolol dosing for better management  -Please follow up outpatient with Dr. De La Rosa    Diagnosis: Hypothyroidism  Assessment and Plan of Treatment: -Please continue home medications    Diagnosis: CAD (coronary artery disease)  Assessment and Plan of Treatment: -Please continue home medications  -May hold plavix pre/post-op BUT DO NOT STOP ASPIRIN PER DR. DE LA ROSA TO KEEP STENT OPEN PLACED 8/2021    Diagnosis: Type 2 diabetes mellitus  Assessment and Plan of Treatment: -Please continue home medications    Diagnosis: Chronic atrial fibrillation  Assessment and Plan of Treatment: -Please continue with metoprolol 12.5mg twice a day    Diagnosis: Leukocytosis  Assessment and Plan of Treatment: -Please continue home medications    Diagnosis: History of Clostridium difficile infection  Assessment and Plan of Treatment: -Please monitor for loose bowel movements with IV antibiotics and consider repeating GI PCR if diarrhea continues    
DISPLAY PLAN FREE TEXT

## 2023-01-16 NOTE — PROCEDURE NOTE - NSPROCNAME_GEN_A_CORE
Debridement
Peripheral Line Insertion
Peripheral Line Insertion
Central Line Insertion
No assistance needed

## 2023-03-06 NOTE — DISCHARGE NOTE ADULT - MEDICATION SUMMARY - MEDICATIONS TO STOP TAKING
I will STOP taking the medications listed below when I get home from the hospital:    VESIcare 5 mg oral tablet  -- 1 tab(s) by mouth once a day  4 pm    donepezil 5 mg oral tablet  -- 1 tab(s) by mouth once a day (at bedtime)    ranitidine 150 mg oral tablet  -- 1 tab(s) by mouth once a day    acetaminophen 325 mg oral tablet  -- 1 tab(s) by mouth once, As Needed  Pain    docusate sodium 100 mg oral capsule  -- 1 cap(s) by mouth 3 times a day    senna oral tablet  -- 2 tab(s) by mouth once a day (at bedtime)    QUETIAPINE FUMARATE 25 MG TAB    PRAVASTATIN SODIUM 40 MG TAB    MONTELUKAST SOD 10 MG TABLET    MEMANTINE HCL 10 MG TABLET    CITALOPRAM HBR 40 MG TABLET    aspirin 81 mg oral tablet, chewable  -- 1 tab(s) by mouth once a day    collagenase 250 units/g topical ointment  -- 1 application on skin 3 times a day    metformin 1000 mg oral tablet  -- 0.5 tab(500mg)(s) by mouth 2 times a day 06-Mar-2023 22:25

## 2024-04-23 NOTE — PROGRESS NOTE ADULT - PROBLEM/PLAN-7
DISPLAY PLAN FREE TEXT
DISPLAY PLAN FREE TEXT
show
DISPLAY PLAN FREE TEXT

## 2024-05-13 NOTE — PATIENT PROFILE ADULT. - NS PRO OT REFERRAL QUES 2 YN
Minimal blood sugar elevation.  Limit sugar and carb intake and exercise a minimum of 150 minutes/week.  PSA is normal.  Lipids look good.  Continue Lipitor at 10 mg daily.  Recheck CMP and lipids in 6 months.  Recheck PSA in 1 year.  Electronically signed by: Nickolas Stanley DO 5/13/2024   
no

## 2024-12-01 NOTE — PROGRESS NOTE ADULT - MINUTES
This RN took pt to trauma room for LP with Dr De La Fuente. Pt tolerated well, although not enough sample was obtained, per MD pt went back to original roomto rest for a couple minutes.     Placed back in room HOB elevated call light in reach      Elba Rodriguez RN  12/01/24 1952    
25
36
36
35
36

## 2025-02-03 NOTE — DISCHARGE NOTE ADULT - NS MD DC FALL RISK RISK
-- DO NOT REPLY / DO NOT REPLY ALL --  -- This inbox is not monitored. If this was sent to the wrong provider or department, reroute message to P ECO Reroute pool. --  -- Message is from Engagement Center Operations (ECO) --     General Patient Message: Patient daughter is calling to inform that they'll be moving her mother into the [facility] rehab location and that she needs  to send over the diagnosis and medication list as well as the last visit notes. Fax number provided is [phone]. Please advise and follow up with daughter directly. No longer needs an in person appointment with .   Caller Information         Contact Date/Time Type Contact Phone/Fax     02/03/2025 11:14 AM CST Phone (Incoming) Emma Mcgarry (Emergency Contact) [phone] (M)                Alternative phone number:      Can a detailed message be left? Yes - Voicemail   Patient has been advised the message will be addressed within 2-3 business days.                    Copied from CRM #[id number]. Topic: MW Messaging - MW Patient Request  >> Feb 3, 2025 11:15 AM Juliette FOREMAN wrote:  Deya Mcgarry called requesting to send a general message to clinician.   Verified issue is NOT regarding a symptom(s) requiring routine or emergent triage. Verified another message template type and CRM does not apply.     Selected 'Wrap Up CRM' and created new Telephone Encounter after clicking 'Convert to Clinical Call'. Selected appropriate Reason for Call.  Sent Pt message template and routed as routine priority per Clinician KB page to appropriate clinician pool. Readback full message.      For information on Fall & Injury Prevention, visit [email or web address]